# Patient Record
Sex: FEMALE | Race: WHITE | NOT HISPANIC OR LATINO | Employment: OTHER | URBAN - METROPOLITAN AREA
[De-identification: names, ages, dates, MRNs, and addresses within clinical notes are randomized per-mention and may not be internally consistent; named-entity substitution may affect disease eponyms.]

---

## 2019-01-07 ENCOUNTER — TRANSFERRED RECORDS (OUTPATIENT)
Dept: HEALTH INFORMATION MANAGEMENT | Facility: CLINIC | Age: 59
End: 2019-01-07

## 2024-05-20 ENCOUNTER — HOSPITAL ENCOUNTER (INPATIENT)
Facility: CLINIC | Age: 64
LOS: 2 days | Discharge: HOME | DRG: 683 | End: 2024-05-22
Attending: EMERGENCY MEDICINE | Admitting: INTERNAL MEDICINE
Payer: COMMERCIAL

## 2024-05-20 ENCOUNTER — APPOINTMENT (OUTPATIENT)
Dept: CARDIOLOGY | Facility: CLINIC | Age: 64
DRG: 683 | End: 2024-05-20
Attending: PHYSICIAN ASSISTANT
Payer: COMMERCIAL

## 2024-05-20 ENCOUNTER — APPOINTMENT (OUTPATIENT)
Dept: ULTRASOUND IMAGING | Facility: CLINIC | Age: 64
DRG: 683 | End: 2024-05-20
Attending: PHYSICIAN ASSISTANT
Payer: COMMERCIAL

## 2024-05-20 DIAGNOSIS — N17.9 ACUTE RENAL FAILURE, UNSPECIFIED ACUTE RENAL FAILURE TYPE (H): Primary | ICD-10-CM

## 2024-05-20 DIAGNOSIS — N17.9 ACUTE RENAL FAILURE SUPERIMPOSED ON CHRONIC KIDNEY DISEASE, UNSPECIFIED ACUTE RENAL FAILURE TYPE, UNSPECIFIED CKD STAGE (H): ICD-10-CM

## 2024-05-20 DIAGNOSIS — N18.9 ACUTE RENAL FAILURE SUPERIMPOSED ON CHRONIC KIDNEY DISEASE, UNSPECIFIED ACUTE RENAL FAILURE TYPE, UNSPECIFIED CKD STAGE (H): ICD-10-CM

## 2024-05-20 DIAGNOSIS — E87.5 HYPERKALEMIA: ICD-10-CM

## 2024-05-20 LAB
ALBUMIN MFR UR ELPH: 308.2 MG/DL
ALBUMIN UR-MCNC: 300 MG/DL
ANION GAP SERPL CALCULATED.3IONS-SCNC: 11 MMOL/L (ref 7–15)
ANION GAP SERPL CALCULATED.3IONS-SCNC: 13 MMOL/L (ref 7–15)
ANION GAP SERPL CALCULATED.3IONS-SCNC: 15 MMOL/L (ref 7–15)
APPEARANCE UR: CLEAR
ATRIAL RATE - MUSE: 43 BPM
BACTERIA #/AREA URNS HPF: ABNORMAL /HPF
BASOPHILS # BLD AUTO: 0 10E3/UL (ref 0–0.2)
BASOPHILS NFR BLD AUTO: 1 %
BILIRUB UR QL STRIP: NEGATIVE
BUN SERPL-MCNC: 59.8 MG/DL (ref 8–23)
BUN SERPL-MCNC: 61 MG/DL (ref 8–23)
BUN SERPL-MCNC: 68.2 MG/DL (ref 8–23)
CALCIUM SERPL-MCNC: 7.7 MG/DL (ref 8.8–10.2)
CALCIUM SERPL-MCNC: 7.9 MG/DL (ref 8.8–10.2)
CALCIUM SERPL-MCNC: 8 MG/DL (ref 8.8–10.2)
CHLORIDE SERPL-SCNC: 100 MMOL/L (ref 98–107)
CHLORIDE SERPL-SCNC: 101 MMOL/L (ref 98–107)
CHLORIDE SERPL-SCNC: 102 MMOL/L (ref 98–107)
COLLECT DURATION TIME UR: 1915 H
COLOR UR AUTO: ABNORMAL
CREAT SERPL-MCNC: 3.62 MG/DL (ref 0.51–0.95)
CREAT SERPL-MCNC: 3.77 MG/DL (ref 0.51–0.95)
CREAT SERPL-MCNC: 3.96 MG/DL (ref 0.51–0.95)
DEPRECATED HCO3 PLAS-SCNC: 18 MMOL/L (ref 22–29)
DEPRECATED HCO3 PLAS-SCNC: 18 MMOL/L (ref 22–29)
DEPRECATED HCO3 PLAS-SCNC: 20 MMOL/L (ref 22–29)
DIASTOLIC BLOOD PRESSURE - MUSE: NORMAL MMHG
EGFRCR SERPLBLD CKD-EPI 2021: 12 ML/MIN/1.73M2
EGFRCR SERPLBLD CKD-EPI 2021: 13 ML/MIN/1.73M2
EGFRCR SERPLBLD CKD-EPI 2021: 13 ML/MIN/1.73M2
EOSINOPHIL # BLD AUTO: 0.2 10E3/UL (ref 0–0.7)
EOSINOPHIL NFR BLD AUTO: 2 %
ERYTHROCYTE [DISTWIDTH] IN BLOOD BY AUTOMATED COUNT: 14.7 % (ref 10–15)
GLUCOSE BLDC GLUCOMTR-MCNC: 118 MG/DL (ref 70–99)
GLUCOSE BLDC GLUCOMTR-MCNC: 128 MG/DL (ref 70–99)
GLUCOSE BLDC GLUCOMTR-MCNC: 180 MG/DL (ref 70–99)
GLUCOSE BLDC GLUCOMTR-MCNC: 198 MG/DL (ref 70–99)
GLUCOSE BLDC GLUCOMTR-MCNC: 211 MG/DL (ref 70–99)
GLUCOSE BLDC GLUCOMTR-MCNC: 77 MG/DL (ref 70–99)
GLUCOSE BLDC GLUCOMTR-MCNC: 96 MG/DL (ref 70–99)
GLUCOSE SERPL-MCNC: 119 MG/DL (ref 70–99)
GLUCOSE SERPL-MCNC: 196 MG/DL (ref 70–99)
GLUCOSE SERPL-MCNC: 68 MG/DL (ref 70–99)
GLUCOSE UR STRIP-MCNC: 30 MG/DL
HBA1C MFR BLD: 9.7 %
HCT VFR BLD AUTO: 33.1 % (ref 35–47)
HGB BLD-MCNC: 10.7 G/DL (ref 11.7–15.7)
HGB UR QL STRIP: ABNORMAL
HOLD SPECIMEN: NORMAL
HOLD SPECIMEN: NORMAL
HYALINE CASTS: 1 /LPF
IMM GRANULOCYTES # BLD: 0 10E3/UL
IMM GRANULOCYTES NFR BLD: 1 %
INTERPRETATION ECG - MUSE: NORMAL
IRON BINDING CAPACITY (ROCHE): 248 UG/DL (ref 240–430)
IRON SATN MFR SERPL: 15 % (ref 15–46)
IRON SERPL-MCNC: 38 UG/DL (ref 37–145)
KETONES UR STRIP-MCNC: NEGATIVE MG/DL
LEUKOCYTE ESTERASE UR QL STRIP: NEGATIVE
LVEF ECHO: NORMAL
LYMPHOCYTES # BLD AUTO: 1.2 10E3/UL (ref 0.8–5.3)
LYMPHOCYTES NFR BLD AUTO: 16 %
MCH RBC QN AUTO: 28.3 PG (ref 26.5–33)
MCHC RBC AUTO-ENTMCNC: 32.3 G/DL (ref 31.5–36.5)
MCV RBC AUTO: 88 FL (ref 78–100)
MONOCYTES # BLD AUTO: 0.7 10E3/UL (ref 0–1.3)
MONOCYTES NFR BLD AUTO: 10 %
MUCOUS THREADS #/AREA URNS LPF: PRESENT /LPF
NEUTROPHILS # BLD AUTO: 5.2 10E3/UL (ref 1.6–8.3)
NEUTROPHILS NFR BLD AUTO: 70 %
NITRATE UR QL: NEGATIVE
NRBC # BLD AUTO: 0 10E3/UL
NRBC BLD AUTO-RTO: 0 /100
NT-PROBNP SERPL-MCNC: 2332 PG/ML (ref 0–900)
P AXIS - MUSE: -27 DEGREES
PH UR STRIP: 5.5 [PH] (ref 5–7)
PLATELET # BLD AUTO: 258 10E3/UL (ref 150–450)
POTASSIUM SERPL-SCNC: 5.1 MMOL/L (ref 3.4–5.3)
POTASSIUM SERPL-SCNC: 5.1 MMOL/L (ref 3.4–5.3)
POTASSIUM SERPL-SCNC: 5.5 MMOL/L (ref 3.4–5.3)
POTASSIUM SERPL-SCNC: 5.9 MMOL/L (ref 3.4–5.3)
POTASSIUM SERPL-SCNC: 6.3 MMOL/L (ref 3.4–5.3)
PR INTERVAL - MUSE: 242 MS
PROT 24H UR-MRATE: 3.48 MG/SPECIMEN
QRS DURATION - MUSE: 98 MS
QT - MUSE: 488 MS
QTC - MUSE: 412 MS
R AXIS - MUSE: 13 DEGREES
RBC # BLD AUTO: 3.78 10E6/UL (ref 3.8–5.2)
RBC URINE: <1 /HPF
SODIUM SERPL-SCNC: 131 MMOL/L (ref 135–145)
SODIUM SERPL-SCNC: 132 MMOL/L (ref 135–145)
SODIUM SERPL-SCNC: 135 MMOL/L (ref 135–145)
SP GR UR STRIP: 1.01 (ref 1–1.03)
SPECIMEN VOL UR: 90 ML
SQUAMOUS EPITHELIAL: <1 /HPF
SYSTOLIC BLOOD PRESSURE - MUSE: NORMAL MMHG
T AXIS - MUSE: 42 DEGREES
UROBILINOGEN UR STRIP-MCNC: NORMAL MG/DL
VENTRICULAR RATE- MUSE: 43 BPM
WBC # BLD AUTO: 7.3 10E3/UL (ref 4–11)
WBC URINE: 2 /HPF

## 2024-05-20 PROCEDURE — 80048 BASIC METABOLIC PNL TOTAL CA: CPT | Performed by: EMERGENCY MEDICINE

## 2024-05-20 PROCEDURE — 84166 PROTEIN E-PHORESIS/URINE/CSF: CPT | Mod: 26 | Performed by: PATHOLOGY

## 2024-05-20 PROCEDURE — 85025 COMPLETE CBC W/AUTO DIFF WBC: CPT | Performed by: EMERGENCY MEDICINE

## 2024-05-20 PROCEDURE — 93005 ELECTROCARDIOGRAM TRACING: CPT

## 2024-05-20 PROCEDURE — 99285 EMERGENCY DEPT VISIT HI MDM: CPT | Mod: 25

## 2024-05-20 PROCEDURE — 96375 TX/PRO/DX INJ NEW DRUG ADDON: CPT

## 2024-05-20 PROCEDURE — 81050 URINALYSIS VOLUME MEASURE: CPT | Performed by: INTERNAL MEDICINE

## 2024-05-20 PROCEDURE — 250N000013 HC RX MED GY IP 250 OP 250 PS 637: Performed by: EMERGENCY MEDICINE

## 2024-05-20 PROCEDURE — 82962 GLUCOSE BLOOD TEST: CPT

## 2024-05-20 PROCEDURE — 250N000012 HC RX MED GY IP 250 OP 636 PS 637: Performed by: INTERNAL MEDICINE

## 2024-05-20 PROCEDURE — 99223 1ST HOSP IP/OBS HIGH 75: CPT | Performed by: PHYSICIAN ASSISTANT

## 2024-05-20 PROCEDURE — 250N000013 HC RX MED GY IP 250 OP 250 PS 637: Performed by: INTERNAL MEDICINE

## 2024-05-20 PROCEDURE — 258N000001 HC RX 258: Performed by: EMERGENCY MEDICINE

## 2024-05-20 PROCEDURE — 81001 URINALYSIS AUTO W/SCOPE: CPT | Performed by: EMERGENCY MEDICINE

## 2024-05-20 PROCEDURE — 250N000011 HC RX IP 250 OP 636: Performed by: EMERGENCY MEDICINE

## 2024-05-20 PROCEDURE — 93975 VASCULAR STUDY: CPT

## 2024-05-20 PROCEDURE — 83880 ASSAY OF NATRIURETIC PEPTIDE: CPT | Performed by: PHYSICIAN ASSISTANT

## 2024-05-20 PROCEDURE — 99222 1ST HOSP IP/OBS MODERATE 55: CPT | Performed by: INTERNAL MEDICINE

## 2024-05-20 PROCEDURE — 36415 COLL VENOUS BLD VENIPUNCTURE: CPT | Performed by: INTERNAL MEDICINE

## 2024-05-20 PROCEDURE — 258N000003 HC RX IP 258 OP 636: Performed by: EMERGENCY MEDICINE

## 2024-05-20 PROCEDURE — 82310 ASSAY OF CALCIUM: CPT | Performed by: INTERNAL MEDICINE

## 2024-05-20 PROCEDURE — 84132 ASSAY OF SERUM POTASSIUM: CPT | Performed by: PHYSICIAN ASSISTANT

## 2024-05-20 PROCEDURE — 83036 HEMOGLOBIN GLYCOSYLATED A1C: CPT | Performed by: INTERNAL MEDICINE

## 2024-05-20 PROCEDURE — 84156 ASSAY OF PROTEIN URINE: CPT | Performed by: INTERNAL MEDICINE

## 2024-05-20 PROCEDURE — 36415 COLL VENOUS BLD VENIPUNCTURE: CPT | Performed by: PHYSICIAN ASSISTANT

## 2024-05-20 PROCEDURE — 120N000001 HC R&B MED SURG/OB

## 2024-05-20 PROCEDURE — 84166 PROTEIN E-PHORESIS/URINE/CSF: CPT | Performed by: PATHOLOGY

## 2024-05-20 PROCEDURE — 96374 THER/PROPH/DIAG INJ IV PUSH: CPT | Mod: 59

## 2024-05-20 PROCEDURE — 36415 COLL VENOUS BLD VENIPUNCTURE: CPT | Performed by: EMERGENCY MEDICINE

## 2024-05-20 PROCEDURE — 93306 TTE W/DOPPLER COMPLETE: CPT

## 2024-05-20 PROCEDURE — 82607 VITAMIN B-12: CPT | Performed by: PHYSICIAN ASSISTANT

## 2024-05-20 PROCEDURE — 93306 TTE W/DOPPLER COMPLETE: CPT | Mod: 26 | Performed by: INTERNAL MEDICINE

## 2024-05-20 PROCEDURE — 83550 IRON BINDING TEST: CPT | Performed by: PHYSICIAN ASSISTANT

## 2024-05-20 RX ORDER — ATORVASTATIN CALCIUM 40 MG/1
40 TABLET, FILM COATED ORAL DAILY
COMMUNITY
Start: 2024-05-16 | End: 2025-05-16

## 2024-05-20 RX ORDER — LIDOCAINE 40 MG/G
CREAM TOPICAL
Status: DISCONTINUED | OUTPATIENT
Start: 2024-05-20 | End: 2024-05-22 | Stop reason: HOSPADM

## 2024-05-20 RX ORDER — ONDANSETRON 4 MG/1
4 TABLET, ORALLY DISINTEGRATING ORAL EVERY 6 HOURS PRN
Status: DISCONTINUED | OUTPATIENT
Start: 2024-05-20 | End: 2024-05-22 | Stop reason: HOSPADM

## 2024-05-20 RX ORDER — SODIUM BICARBONATE 650 MG/1
650 TABLET ORAL 2 TIMES DAILY
Status: DISCONTINUED | OUTPATIENT
Start: 2024-05-20 | End: 2024-05-22 | Stop reason: HOSPADM

## 2024-05-20 RX ORDER — DEXTROSE MONOHYDRATE 25 G/50ML
25 INJECTION, SOLUTION INTRAVENOUS ONCE
Status: COMPLETED | OUTPATIENT
Start: 2024-05-20 | End: 2024-05-20

## 2024-05-20 RX ORDER — ACETAMINOPHEN 325 MG/1
650 TABLET ORAL EVERY 4 HOURS PRN
Status: DISCONTINUED | OUTPATIENT
Start: 2024-05-20 | End: 2024-05-22 | Stop reason: HOSPADM

## 2024-05-20 RX ORDER — INSULIN GLARGINE 100 [IU]/ML
16 INJECTION, SOLUTION SUBCUTANEOUS AT BEDTIME
Status: ON HOLD | COMMUNITY
Start: 2024-05-17 | End: 2024-05-22

## 2024-05-20 RX ORDER — AMLODIPINE BESYLATE 10 MG/1
10 TABLET ORAL DAILY
COMMUNITY
Start: 2024-05-15

## 2024-05-20 RX ORDER — AMOXICILLIN 250 MG
2 CAPSULE ORAL 2 TIMES DAILY PRN
Status: DISCONTINUED | OUTPATIENT
Start: 2024-05-20 | End: 2024-05-22 | Stop reason: HOSPADM

## 2024-05-20 RX ORDER — NICOTINE POLACRILEX 4 MG
15-30 LOZENGE BUCCAL
Status: DISCONTINUED | OUTPATIENT
Start: 2024-05-20 | End: 2024-05-22 | Stop reason: HOSPADM

## 2024-05-20 RX ORDER — DEXTROSE MONOHYDRATE 25 G/50ML
25-50 INJECTION, SOLUTION INTRAVENOUS
Status: DISCONTINUED | OUTPATIENT
Start: 2024-05-20 | End: 2024-05-22 | Stop reason: HOSPADM

## 2024-05-20 RX ORDER — CALCITRIOL 0.25 UG/1
0.25 CAPSULE, LIQUID FILLED ORAL DAILY
Status: DISCONTINUED | OUTPATIENT
Start: 2024-05-20 | End: 2024-05-22 | Stop reason: HOSPADM

## 2024-05-20 RX ORDER — NICOTINE POLACRILEX 4 MG
15-30 LOZENGE BUCCAL
Status: DISCONTINUED | OUTPATIENT
Start: 2024-05-20 | End: 2024-05-21

## 2024-05-20 RX ORDER — CALCIUM GLUCONATE 20 MG/ML
1 INJECTION, SOLUTION INTRAVENOUS ONCE
Status: COMPLETED | OUTPATIENT
Start: 2024-05-20 | End: 2024-05-20

## 2024-05-20 RX ORDER — FUROSEMIDE 80 MG
1 TABLET ORAL DAILY
Status: ON HOLD | COMMUNITY
Start: 2024-05-15 | End: 2024-05-22

## 2024-05-20 RX ORDER — LISINOPRIL 30 MG/1
1 TABLET ORAL DAILY
Status: ON HOLD | COMMUNITY
Start: 2024-05-15 | End: 2024-05-22

## 2024-05-20 RX ORDER — AMOXICILLIN 250 MG
1 CAPSULE ORAL 2 TIMES DAILY PRN
Status: DISCONTINUED | OUTPATIENT
Start: 2024-05-20 | End: 2024-05-22 | Stop reason: HOSPADM

## 2024-05-20 RX ORDER — AMLODIPINE BESYLATE 10 MG/1
10 TABLET ORAL DAILY
Status: DISCONTINUED | OUTPATIENT
Start: 2024-05-21 | End: 2024-05-22 | Stop reason: HOSPADM

## 2024-05-20 RX ORDER — ATORVASTATIN CALCIUM 40 MG/1
40 TABLET, FILM COATED ORAL DAILY
Status: DISCONTINUED | OUTPATIENT
Start: 2024-05-21 | End: 2024-05-22 | Stop reason: HOSPADM

## 2024-05-20 RX ORDER — LEVOTHYROXINE SODIUM 175 UG/1
175 TABLET ORAL DAILY
COMMUNITY
Start: 2024-05-15

## 2024-05-20 RX ORDER — ONDANSETRON 2 MG/ML
4 INJECTION INTRAMUSCULAR; INTRAVENOUS EVERY 6 HOURS PRN
Status: DISCONTINUED | OUTPATIENT
Start: 2024-05-20 | End: 2024-05-22 | Stop reason: HOSPADM

## 2024-05-20 RX ORDER — DEXTROSE MONOHYDRATE 25 G/50ML
25-50 INJECTION, SOLUTION INTRAVENOUS
Status: DISCONTINUED | OUTPATIENT
Start: 2024-05-20 | End: 2024-05-21

## 2024-05-20 RX ORDER — DEXTROSE MONOHYDRATE 25 G/50ML
INJECTION, SOLUTION INTRAVENOUS
Status: COMPLETED
Start: 2024-05-20 | End: 2024-05-20

## 2024-05-20 RX ADMIN — INSULIN ASPART 1 UNITS: 100 INJECTION, SOLUTION INTRAVENOUS; SUBCUTANEOUS at 22:46

## 2024-05-20 RX ADMIN — SODIUM CHLORIDE 1000 ML: 9 INJECTION, SOLUTION INTRAVENOUS at 13:16

## 2024-05-20 RX ADMIN — DEXTROSE MONOHYDRATE 300 ML: 100 INJECTION, SOLUTION INTRAVENOUS at 13:45

## 2024-05-20 RX ADMIN — CALCITRIOL CAPSULES 0.25 MCG 0.25 MCG: 0.25 CAPSULE ORAL at 18:53

## 2024-05-20 RX ADMIN — DEXTROSE MONOHYDRATE 25 G: 25 INJECTION, SOLUTION INTRAVENOUS at 13:38

## 2024-05-20 RX ADMIN — SODIUM BICARBONATE 650 MG TABLET 650 MG: at 21:18

## 2024-05-20 RX ADMIN — CALCIUM GLUCONATE 1 G: 20 INJECTION, SOLUTION INTRAVENOUS at 13:17

## 2024-05-20 RX ADMIN — SODIUM ZIRCONIUM CYCLOSILICATE 15 G: 10 POWDER, FOR SUSPENSION ORAL at 22:49

## 2024-05-20 RX ADMIN — Medication 8.1 UNITS: at 13:42

## 2024-05-20 ASSESSMENT — COLUMBIA-SUICIDE SEVERITY RATING SCALE - C-SSRS
2. HAVE YOU ACTUALLY HAD ANY THOUGHTS OF KILLING YOURSELF IN THE PAST MONTH?: NO
1. IN THE PAST MONTH, HAVE YOU WISHED YOU WERE DEAD OR WISHED YOU COULD GO TO SLEEP AND NOT WAKE UP?: NO
6. HAVE YOU EVER DONE ANYTHING, STARTED TO DO ANYTHING, OR PREPARED TO DO ANYTHING TO END YOUR LIFE?: NO

## 2024-05-20 ASSESSMENT — ACTIVITIES OF DAILY LIVING (ADL)
ADLS_ACUITY_SCORE: 38
ADLS_ACUITY_SCORE: 25
ADLS_ACUITY_SCORE: 38
ADLS_ACUITY_SCORE: 35
ADLS_ACUITY_SCORE: 24
ADLS_ACUITY_SCORE: 33
ADLS_ACUITY_SCORE: 24
ADLS_ACUITY_SCORE: 35
ADLS_ACUITY_SCORE: 24
ADLS_ACUITY_SCORE: 23
ADLS_ACUITY_SCORE: 38
ADLS_ACUITY_SCORE: 25

## 2024-05-20 NOTE — CONSULTS
RENAL CONSULTATION NOTE    REFERRING MD:  Rosio Clayton PA-C     REASON FOR CONSULTATION:  CKD, Hyperkalemia     HPI:  64 y.o woman with advanced CKD due to DM and HTN, who was directed to the hospital for abnormal lab.     She moved to [place] from [place], [place].   She says a nephrologist over a year ago.  She says she was told her kidney function was at 15% .  She says she was told her kidney disease was due to DM and HTN.     Her primary care checked her labs on 5/15.   Her creatinine was 4.16 mg/dl.   Per note, her Scr was 2.8 mg/dl on 4/2023    She has been feeling more tired, but otherwise no other complaints.  She denies N/V.  She says food tastes good.  Denies SOB.   Denies taking NSAIDs.   She came back from [place] April.     I reviewed notes from ER (Dr. Ravi) and IM (WILMAN Garrido)  ROS:  A complete review of systems was performed and is negative except as noted above.    PMH:  No past medical history on file.    PSH:  No past surgical history on file.    MEDICATIONS:    Current Facility-Administered Medications   Medication Dose Route Frequency Provider Last Rate Last Admin    dextrose 10% BOLUS 300 mL  300 mL Intravenous Once Elzbieta Ravi MD 75 mL/hr at 05/20/24 1345 300 mL at 05/20/24 1345    [START ON 5/21/2024] insulin aspart (NovoLOG) injection (RAPID ACTING)  1-7 Units Subcutaneous TID AC Rosio Clayton PA-C        insulin aspart (NovoLOG) injection (RAPID ACTING)  1-5 Units Subcutaneous At Bedtime Rosio Clayton PA-C        sodium chloride (PF) 0.9% PF flush 3 mL  3 mL Intracatheter Q8H Rosio Clayton PA-C           ALLERGIES:    Allergies as of 05/20/2024    (No Known Allergies)       FH:  No family history on file.    SH:    Social History     Socioeconomic History    Marital status: Legally      Spouse name: Not on file    Number of children: Not on file    Years of education: Not on file    Highest education level: Not on file   Occupational History    Not  "on file   Tobacco Use    Smoking status: Not on file    Smokeless tobacco: Not on file   Substance and Sexual Activity    Alcohol use: Not on file    Drug use: Not on file    Sexual activity: Not on file   Other Topics Concern    Not on file   Social History Narrative    Not on file     Social Determinants of Health     Financial Resource Strain: Not on file   Food Insecurity: Not on file   Transportation Needs: Not on file   Physical Activity: Not on file   Stress: Not on file   Social Connections: Not on file   Interpersonal Safety: Not on file   Housing Stability: Not on file       PHYSICAL EXAM:    BP (!) 141/57 (BP Location: Right arm, Patient Position: Left side, Cuff Size: Adult Regular)   Pulse 60   Temp 97.8  F (36.6  C) (Oral)   Resp 18   Ht 1.575 m (5' 2\")   Wt 81.6 kg (179 lb 12.8 oz)   SpO2 94%   BMI 32.89 kg/m    GENERAL: NAD  HEENT:  Normocephalic. No gross abnormalities.  Pupils equal.  MMM.    CV: RRR, + murmurs, no clicks, gallops, or rubs, trace edema  RESP: Clear bilaterally with good efforts  GI: Abdomen obese, soft, NT. ND  MUSCULOSKELETAL: extremities trace edema.  SKIN: no suspicious lesions or rashes, dry to touch  NEURO:  Awake, alert and conversing normally.  PSYCH: mood good, affect appropriate  LYMPH: No palpable ant/post cervical     LABS:      CBC RESULTS:     Recent Labs   Lab 05/20/24  1141   WBC 7.3   RBC 3.78*   HGB 10.7*   HCT 33.1*          BMP RESULTS:  Recent Labs   Lab 05/20/24  1526 05/20/24  1423 05/20/24  1321 05/20/24  1309 05/20/24  1141   *  --   --   --  135   POTASSIUM 5.1  5.1  --   --  5.9* 6.3*   CHLORIDE 101  --   --   --  102   CO2 18*  --   --   --  18*   BUN 61.0*  --   --   --  68.2*   CR 3.77*  --   --   --  3.96*   GLC 68* 118* 96  --  119*   BETH 7.9*  --   --   --  8.0*       INRNo lab results found in last 7 days.     DIAGNOSTICS:  Reviewed    A/P:  64 y.o woman with advanced CKD presumed due to DM and HTN.     # CKD V: No uremic " symptosm.     # Hyperkalemia: Improvement with treatment.     # Acidosis: Bicarb is below target    # Hypertension: Okay with SBP ~ 140 for now.     # Anemia due to CKD + component of Fe def:    # Hypocalcemia: like vit D def.     # Uncontrolled DM:     Plan:  # Check UPCR, SPEP/UPEP  # Check vit D, phos and PTH  # Start sodium bicarbonate   # Okay with SBP ~ 140  # Check TTE-loud heart murmur  # Check hep B/C  # 2 grams Na and 2 grams K restricted diet  # No indication for RRT    Remigio Cobian MD  [facility] - Nephrology  Office Phone: [phone]  Pager: [phone]

## 2024-05-20 NOTE — ED PROVIDER NOTES
"ED ATTENDING PHYSICIAN NOTE:   I evaluated this patient in conjunction with Albert Clifton NP.  I have participated in the care of the patient and personally performed key elements of the history, exam, and medical decision making.      HPI:   Nisreen Mckenna is a 64 year old female who presents for abnormal lab results. The patients primary care provider notified her that her kidney function was elevated. States she was diagnosed with CKD back in [place], recently moved to [place].   She does not know her last sodium level.  She notes she last saw her primary provider in [place] over 1 year ago she believes.  She denies weakness, fevers, urinary changes, abdominal or flank pain.  She has been eating and drinking at baseline.  She has had no vomiting or diarrhea.    Independent Historian:   None    Review of External Notes: Office visit from 5/15/2024 reviewed for establishment of care.  Blood was drawn at this time with creatinine of over 4 noted.  We were able to obtain creatinine from a clinic in [place] was noted to be approximately 2.8 in April 2023.     I reviewed the patient's PMH and medications.     EXAM:   /58   Pulse (!) 46   Temp 98.3  F (36.8  C) (Oral)   Resp 18   Ht 1.575 m (5' 2\")   Wt 81.2 kg (179 lb)   SpO2 95%   BMI 32.74 kg/m    General: Elderly adult sitting upright  Eyes: PERRL, Conjunctive within normal limits.  No scleral icterus  ENT: Moist mucous membranes, oropharynx clear.   CV: Normal S1S2.  Systolic murmur appreciated.  Bradycardic.  Regular.  Resp: Clear to auscultation bilaterally, no wheezes, rales or rhonchi. Normal respiratory effort.  GI: Abdomen is soft, nontender and nondistended. No palpable masses. No rebound or guarding.  No CVA tenderness to percussion.  MSK: Trace edema bilaterally at the ankles.  Nontender. Normal active range of motion.  Skin: Warm and dry. No rashes or lesions or ecchymoses on visible skin.  Neuro: Alert and oriented. Responds " appropriately to all questions and commands. No focal findings appreciated.   Psych: Appropriate mood and affect.    Labs Ordered and Resulted from Time of ED Arrival to Time of ED Departure   ROUTINE UA WITH MICROSCOPIC REFLEX TO CULTURE - Abnormal       Result Value    Color Urine Straw      Appearance Urine Clear      Glucose Urine 30 (*)     Bilirubin Urine Negative      Ketones Urine Negative      Specific Gravity Urine 1.010      Blood Urine Trace (*)     pH Urine 5.5      Protein Albumin Urine 300 (*)     Urobilinogen Urine Normal      Nitrite Urine Negative      Leukocyte Esterase Urine Negative      Bacteria Urine Few (*)     Mucus Urine Present (*)     RBC Urine <1      WBC Urine 2      Squamous Epithelials Urine <1      Hyaline Casts Urine 1     BASIC METABOLIC PANEL - Abnormal    Sodium 135      Potassium 6.3 (*)     Chloride 102      Carbon Dioxide (CO2) 18 (*)     Anion Gap 15      Urea Nitrogen 68.2 (*)     Creatinine 3.96 (*)     GFR Estimate 12 (*)     Calcium 8.0 (*)     Glucose 119 (*)    CBC WITH PLATELETS AND DIFFERENTIAL - Abnormal    WBC Count 7.3      RBC Count 3.78 (*)     Hemoglobin 10.7 (*)     Hematocrit 33.1 (*)     MCV 88      MCH 28.3      MCHC 32.3      RDW 14.7      Platelet Count 258      % Neutrophils 70      % Lymphocytes 16      % Monocytes 10      % Eosinophils 2      % Basophils 1      % Immature Granulocytes 1      NRBCs per 100 WBC 0      Absolute Neutrophils 5.2      Absolute Lymphocytes 1.2      Absolute Monocytes 0.7      Absolute Eosinophils 0.2      Absolute Basophils 0.0      Absolute Immature Granulocytes 0.0      Absolute NRBCs 0.0     POTASSIUM - Abnormal    Potassium 5.9 (*)    GLUCOSE BY METER - Normal    GLUCOSE BY METER POCT 96     GLUCOSE MONITOR NURSING POCT   GLUCOSE MONITOR NURSING POCT   GLUCOSE MONITOR NURSING POCT       ECG results from 05/20/24   EKG 12 lead     Value    Systolic Blood Pressure     Diastolic Blood Pressure     Ventricular Rate 43     Atrial Rate 43    VA Interval 242    QRS Duration 98        QTc 412    P Axis -27    R AXIS 13    T Axis 42    Interpretation ECG      Sinus bradycardia with 1st degree A-V block  Minimal voltage criteria for LVH, may be normal variant ( [place] product )  Abnormal ECG  No previous ECGs available  Confirmed by - EMERGENCY ROOM, PHYSICIAN ([id number]),  JAZMYNE MCGUIRE ([id number]) on 5/20/2024 12:59:05 PM         Medications   glucose gel 15-30 g (has no administration in time range)     Or   dextrose 50 % injection 25-50 mL (has no administration in time range)     Or   glucagon injection 1 mg (has no administration in time range)   dextrose 10% BOLUS 300 mL (300 mLs Intravenous $New Bag 5/20/24 1345)   calcium gluconate 1 g in 50 mL in sodium chloride intermittent infusion (1 g Intravenous $Given 5/20/24 1317)   dextrose 50 % injection 25 g (25 g Intravenous $Given 5/20/24 1338)   insulin regular 1 unit/mL injection 8.1 Units (8.1 Units Intravenous $Given 5/20/24 1342)   sodium chloride 0.9% BOLUS 1,000 mL (1,000 mLs Intravenous $New Bag 5/20/24 1316)   dextrose 50 % injection (  Not Given 5/20/24 1350)       Independent Interpretation (X-rays, CTs, rhythm strip):  None    Consultations/Discussion of Management or Tests:  None     Social Determinants of Health affecting care:   None     MEDICAL DECISION MAKING/ASSESSMENT AND PLAN:   Nisreen Mckenna is a 64-year-old female with chronic kidney disease and relatively new establishment of care in this area after moving from [place] who presents to the emergency department with outpatient clinic concerns for elevated creatinine.  On evaluation here she is asymptomatic.  She has known chronic kidney disease based on labs from a [place] clinic in April 2023.  She has acute on chronic kidney disease and is noted to be hyperkalemic today and treated with hyperkalemia therapy.  She has sinus bradycardia with first-degree AV block but no other EKG changes.  Medication for  immediate dialysis were nephrology intervention, but will need ongoing telemetry monitoring treatment of hyperkalemia.     DIAGNOSIS:     ICD-10-CM    1. Hyperkalemia  E87.5       2. Acute renal failure superimposed on chronic kidney disease, unspecified acute renal failure type, unspecified CKD stage  (H24)  N17.9     N18.9           DISPOSITION:   Admitted to the hospitalist service, Dr. Dia.     Scribe Disclosure:  I, Werner Estevez, am serving as a scribe at 12:10 PM on 5/20/2024 to document services personally performed by Elzbieta Ravi MD based on my observations and the provider's statements to me.     5/20/2024  [facility] EMERGENCY DEPT     Elzbieta Ravi MD  05/20/24 8276

## 2024-05-20 NOTE — H&P
[facility]  Internal Medicine  History and Physical      Patient Name: Nisreen Mckenna MRN# [id number]   Age: 64 year old YOB: 1960     Date of Admission:5/20/2024    Primary care provider: Vivi Morfin  Date of Service: 5/20/2024         Assessment and Plan:   Nisreen Mckenna is a 64 year old female with a history of HTN, Hypothyroidism, TARA, Obesity, LE edema, CKD, HLD who presents to the ED today as she was notified by her primary care provider's office earlier today that her kidney function was elevated and that she was to report to the ED for further evaluation.        CKD  Likely MICHELLE on CKD  Creatinine on admission 3.96.  Creatinine last week at PCP visit 4.16.  Unclear baseline but reports a hx of CKD due to underlying Diabetes and HTN and was followed by Nephrology in the past while living in [place].    - UA with no signs of infection, but reveals few bacteria, 300 protein and glucose present.  - hold pta Lisinopril and Lasix  - IVF hydration x 1 liter   - check renal ultrasound  - monitor I/O  - Nephrology consult    Hyperkalemia  D/t kidney disease.  Potassium on admission 6.3.  S/p Calcium Gluconate, D50/Insulin and IVF bolus.  - monitor serial potassium levels    Sinus Bradycardia with First-Degree AVB  HR in the 40s with unclear baseline, but likely related to acute hyperkalemia  - monitor on telemetry    HTN  Patient denies any hx of CAD.  Does not recall ever having a stress test or angiogram.  Has had long standing hypertension for 30 years.  - continue pta Amlodipine  - hold Lisinopril and Lasix for now due to elevated creatinine.    Hx Atrial Flutter s/p Ablation  S/p ablation while living in [place] ~10 years ago.  Was previously on blood thinners.  No current blood thinners.  - monitor on telemetry    Hx Pulmonary HTN  Heart Murmur  Systolic murmur noted on exam.  Pt reports this has been known.  No hx of valvular heart disease that she is aware of.  Has chronic  peripheral edema at baseline.  Known pHTN  - obtain echocardiogram and add on BNP    DM Type 2  Diagnosed ~30 years ago.  Was off insulin for 4 years after weight loss.  Not checking blood sugars recently until seen by PCP last week where Hgb A1C 10.2 (5/15/24).  Seen by Diabetes Educator last week and started on Glargine 16 units daily.  BS on admission 119.    - will start ISS protocol  - awaiting formal pharmacy med rec, likely resume pta Glargine in am    Hyperlipidemia   Last lipid panel (5/15/2024) Tchol 351, HDL 62, , Trig 217.  Was on Atorvastatin years ago, recently ordered to start again by PCP last week  - continue Atorvastatin    Hypothyroidism  TSH last week wnl.  - continue Levothyroxine    Chronic Anemia  Hgb last week 11.2, down to 10.7 on admission which is likely related to kidney disease.  Patient denies any blood loss.  Has had a colonoscopy within the past 10 years she reports was unremarkable.  - check iron studies, B12, folate    TARA  - no longer uses cpap after weight loss  - follow up with PCP to consider sleep evaluation to reassess    Obesity, BMI 32    CODE: full  Diet/IVF: renal diet  DVT ppx: scd    Rosio Clayton MS PA-C  Physician Assistant   Hospitalist Service    Awaiting formal pharmacy med rec to confirm home medications.           Chief Complaint:   Abnormal Labs         HPI:   64 year old female with a history of HTN, Hypothyroidism, TARA, Obesity, LE edema, CKD, HLD who presents to the ED today as she was notified by her primary care provider's office earlier today that her kidney function was elevated and that she was to report to the ED for further evaluation.      Patient presents to the ED today after she established care with a new PCP last week and was found to have elevated creatinine and instructed to present to the ED.  Patient reports overall feeling fatigued, but otherwise has no specific complaints.      Patient lived in [place] until September 2023 and  moved to [place] where she lives with her son.  She did not establish care with a new PCP until last week where she was noted to have an A1C of 10.2, referred to diabetes education and started on Glargine 16 units daily.  She was also found to have elevated lipid panel and started on Atorvastatin of which she has yet to start.  Additionally, noted to have elevated creatinine.  She has chronic LE edema which is at baseline for which she is on Lasix 80mg daily.  She reports a hx of a heart murmur with no recent echocardiogram.  She denies any hx of CAD.  She has a hx of atrial flutter s/p ablation and has been on blood thinners in the past, no longer.  She has pulmonary hypertension and TARA, no longer on cpap since she lost weight a few years ago.           Past Medical History:   HTN, Hypothyroidism, TARA, Obesity, LE edema, CKD, HLD       Past Surgical History:   Cholecystectomy  [place] teeth removal         Social History:     Social History     Socioeconomic History    Marital status: Legally      Spouse name: Not on file    Number of children: Not on file    Years of education: Not on file    Highest education level: Not on file   Occupational History    Not on file   Tobacco Use    Smoking status: Not on file    Smokeless tobacco: Not on file   Substance and Sexual Activity    Alcohol use: Not on file    Drug use: Not on file    Sexual activity: Not on file   Other Topics Concern    Not on file   Social History Narrative    Not on file     Social Determinants of Health     Financial Resource Strain: Not on file   Food Insecurity: Not on file   Transportation Needs: Not on file   Physical Activity: Not on file   Stress: Not on file   Social Connections: Not on file   Interpersonal Safety: Not on file   Housing Stability: Not on file   Lives with her son.  No tobacco or alcohol use.  Is on disability.       Family History:   Grandfather with blood clots  Grandmother with diabetes       Allergies:    No  "Known Allergies       Medications:   Awaiting formal pharmacy med rec         Review of Systems:   A complete ROS was performed and is negative other than what is stated in the HPI.       Physical Exam:   Blood pressure 139/58, pulse (!) 46, temperature 98.3  F (36.8  C), temperature source Oral, resp. rate 18, height 1.575 m (5' 2\"), weight 81.2 kg (179 lb), SpO2 95%.  General: Alert, interactive, NAD  HEENT: AT/NC  Chest/Resp: clear to auscultation bilaterally, no crackles or wheezes  Heart/CV: regular rate and rhythm, 4/6 systolic murmur  Abdomen/GI: Soft, nontender, nondistended. +BS.  No rebound or guarding.  Extremities/MSK: 1+ pitting BLE edema  Skin: Warm and dry  Neuro: Alert & oriented x 3, no focal deficits, moves all extremities equally         Labs:     CMP  Recent Labs   Lab 05/20/24  1321 05/20/24  1141   NA  --  135   POTASSIUM  --  6.3*   CHLORIDE  --  102   CO2  --  18*   ANIONGAP  --  15   GLC 96 119*   BUN  --  68.2*   CR  --  3.96*   GFRESTIMATED  --  12*   BETH  --  8.0*     CBC  Recent Labs   Lab 05/20/24  1141   WBC 7.3   RBC 3.78*   HGB 10.7*   HCT 33.1*   MCV 88   MCH 28.3   MCHC 32.3   RDW 14.7             Imaging/Procedures:   No results found for this or any previous visit.      "

## 2024-05-20 NOTE — ED PROVIDER NOTES
"  Emergency Department Note      History of Present Illness     Chief Complaint  Abnormal Labs    HPI  Nisreen Mckenna is a 64 year old female who presents to the emergency room for repeat evaluation of lab work.  Patient was notified by her primary care provider's office earlier today that her kidney function was elevated and that she was to report to the ED for further evaluation.  Patient reports that she has a history of hypertension, kidney disease, and type 2 diabetes.  Patient states that she recently moved and has been unable to get into a primary care provider's office.  She states that she was diagnosed with kidney disease by her previous provider back in [place] however is unable to recall specifically how elevated her previous kidney function was.  Patient denies any pain, nausea, vomiting, diarrhea.       Independent Historian  None    Review of External Notes  None  Past Medical History   Medical History and Problem List  No past medical history on file.    Medications  No current outpatient medications on file.      Surgical History   No past surgical history on file.  Physical Exam   Patient Vitals for the past 24 hrs:   BP Temp Temp src Pulse Resp SpO2 Height Weight   05/20/24 1215 139/58 -- -- (!) 46 -- -- -- --   05/20/24 1057 (!) 146/60 -- -- -- -- -- 1.575 m (5' 2\") 81.2 kg (179 lb)   05/20/24 1056 -- 98.3  F (36.8  C) Oral 59 18 95 % -- --     Physical Exam  General: Awake, alert, non-toxic.  Head:  Scalp is NC/AT  Eyes:  Conjunctiva normal, PERRL  ENT:  The external nose and ears are normal.     Oropharynx clear, uvula midline.  Neck:  Normal range of motion without rigidity.  CV:  Slow rate and rhythm (EKG confirmed bradycardia.)     Murmer heard on auscultation.   Resp:  Breath sounds are clear bilaterally    Non-labored, no retractions or accessory muscle use  Abdomen: Abdomen is soft, no distension, no tenderness, no masses. No CVA tenderness.  MS:  No lower extremity edema/swelling. No " midline cervical, thoracic, or lumbar tenderness.  Extremities without joint swelling or redness.  Skin:  Warm and dry, No rash or lesions noted.  Neuro:  Alert and oriented.  GCS 15 Moves all extremities normal.  No facial asymmetry. Gait normal.  Psych:  Awake. Alert. Normal affect. Appropriate interactions.   Diagnostics   Lab Results   Labs Ordered and Resulted from Time of ED Arrival to Time of ED Departure   ROUTINE UA WITH MICROSCOPIC REFLEX TO CULTURE - Abnormal       Result Value    Color Urine Straw      Appearance Urine Clear      Glucose Urine 30 (*)     Bilirubin Urine Negative      Ketones Urine Negative      Specific Gravity Urine 1.010      Blood Urine Trace (*)     pH Urine 5.5      Protein Albumin Urine 300 (*)     Urobilinogen Urine Normal      Nitrite Urine Negative      Leukocyte Esterase Urine Negative      Bacteria Urine Few (*)     Mucus Urine Present (*)     RBC Urine <1      WBC Urine 2      Squamous Epithelials Urine <1      Hyaline Casts Urine 1     BASIC METABOLIC PANEL - Abnormal    Sodium 135      Potassium 6.3 (*)     Chloride 102      Carbon Dioxide (CO2) 18 (*)     Anion Gap 15      Urea Nitrogen 68.2 (*)     Creatinine 3.96 (*)     GFR Estimate 12 (*)     Calcium 8.0 (*)     Glucose 119 (*)    CBC WITH PLATELETS AND DIFFERENTIAL - Abnormal    WBC Count 7.3      RBC Count 3.78 (*)     Hemoglobin 10.7 (*)     Hematocrit 33.1 (*)     MCV 88      MCH 28.3      MCHC 32.3      RDW 14.7      Platelet Count 258      % Neutrophils 70      % Lymphocytes 16      % Monocytes 10      % Eosinophils 2      % Basophils 1      % Immature Granulocytes 1      NRBCs per 100 WBC 0      Absolute Neutrophils 5.2      Absolute Lymphocytes 1.2      Absolute Monocytes 0.7      Absolute Eosinophils 0.2      Absolute Basophils 0.0      Absolute Immature Granulocytes 0.0      Absolute NRBCs 0.0     POTASSIUM - Abnormal    Potassium 5.9 (*)    GLUCOSE BY METER - Normal    GLUCOSE BY METER POCT 96     GLUCOSE  MONITOR NURSING POCT   GLUCOSE MONITOR NURSING POCT   GLUCOSE MONITOR NURSING POCT       Imaging  No orders to display       EKG   ECG results from 05/20/24   EKG 12 lead     Value    Systolic Blood Pressure     Diastolic Blood Pressure     Ventricular Rate 43    Atrial Rate 43    CT Interval 242    QRS Duration 98        QTc 412    P Axis -27    R AXIS 13    T Axis 42    Interpretation ECG      Sinus bradycardia with 1st degree A-V block  Minimal voltage criteria for LVH, may be normal variant ( Abebe product )  Abnormal ECG  No previous ECGs available  Confirmed by - EMERGENCY ROOM, PHYSICIAN ([id number]),  JAZMYNE MCGUIRE ([id number]) on 5/20/2024 12:59:05 PM          Independent Interpretation  None  ED Course    Medications Administered  Medications   glucose gel 15-30 g (has no administration in time range)     Or   dextrose 50 % injection 25-50 mL (has no administration in time range)     Or   glucagon injection 1 mg (has no administration in time range)   dextrose 10% BOLUS 300 mL (300 mLs Intravenous $New Bag 5/20/24 1345)   sodium chloride 0.9% BOLUS 1,000 mL (1,000 mLs Intravenous $New Bag 5/20/24 1316)   calcium gluconate 1 g in 50 mL in sodium chloride intermittent infusion (1 g Intravenous $Given 5/20/24 1317)   dextrose 50 % injection 25 g (25 g Intravenous $Given 5/20/24 1338)   insulin regular 1 unit/mL injection 8.1 Units (8.1 Units Intravenous $Given 5/20/24 1342)   dextrose 50 % injection (  Not Given 5/20/24 1350)       Procedures  Procedures     Discussion of Management    Social Determinants of Health adding to complexity of care  None    ED Course     Medical Decision Making / Diagnosis   CMS Diagnoses: None    MIPS     None    MDM  Nisreen Mckenna is a 64 year old female who presents to the ED after being notified by her primary care provider to present to the emergency department.  Per the patient reports she was found to have an elevated kidney function on routine lab work.  Patient  does endorse a history of chronic kidney disease however is unable to call her recall her baseline kidney function.  Patient has a history of hypertension, type 2 diabetes, and chronic kidney disease.  I did repeat a chemistry which did show worsening renal failure.  Creatinine was 3.96 and was GFR 12.  Patient was also found to be hyperkalemic, potassium was 6.3.  This was treated with hyperkalemia protocol.  Upon physical exam the patient is awake and alert.  Heart rate at is down into the 40s.  EKG confirms sinus bradycardia with first-degree AV block.  Patient is completely asymptomatic with this.  Patient denies any chest pain, shortness of breath, nausea and/or vomiting.  Given the patient's worsening renal function, hyperkalemia, and slow heart rate I feel that this patient will most likely be needed to be admitted for further evaluation and follow-up. All questions and concerns were addressed. Patient verbalized understanding. Patient was admitted.       Disposition  The patient was admitted to the hospital.     ICD-10 Codes:    ICD-10-CM    1. Acute renal failure, unspecified acute renal failure type (H24)  N17.9       2. Hyperkalemia  E87.5            Discharge Medications  New Prescriptions    No medications on file         LURDES Kidd CNP, Casey, APRN CNP  05/20/24 2273

## 2024-05-20 NOTE — ED TRIAGE NOTES
Pt presents to ED with elevated kidney function.   Received a call from her primary MD to come into the ED. Has nephrology appt next week. Denies symptoms.      Triage Assessment (Adult)       Row Name 05/20/24 1057          Triage Assessment    Airway WDL WDL        Respiratory WDL    Respiratory WDL WDL

## 2024-05-20 NOTE — PLAN OF CARE
"ER admit. Patient settled   A&OX4. Denies pain. Echo at bedside. Tele placed. Pt states she has a history of pulmonary htn and a heart murmur.     Problem: Adult Inpatient Plan of Care  Goal: Plan of Care Review  Description: The Plan of Care Review/Shift note should be completed every shift.  The Outcome Evaluation is a brief statement about your assessment that the patient is improving, declining, or no change.  This information will be displayed automatically on your shift  note.  Outcome: Not Progressing  Flowsheets (Taken 5/20/2024 5674)  Outcome Evaluation: ER Admit. patient settled. tele on. denies pain  Plan of Care Reviewed With: patient  Overall Patient Progress: no change  Goal: Patient-Specific Goal (Individualized)  Description: You can add care plan individualizations to a care plan. Examples of Individualization might be:  \"Parent requests to be called daily at 9am for status\", \"I have a hard time hearing out of my right ear\", or \"Do not touch me to wake me up as it startles  me\".  Outcome: Not Progressing  Goal: Absence of Hospital-Acquired Illness or Injury  Outcome: Not Progressing  Goal: Optimal Comfort and Wellbeing  Outcome: Not Progressing  Goal: Readiness for Transition of Care  Outcome: Not Progressing     Problem: Acute Kidney Injury/Impairment  Goal: Improved Oral Intake  Outcome: Not Progressing  Goal: Effective Renal Function  Outcome: Not Progressing   Goal Outcome Evaluation:      Plan of Care Reviewed With: patient    Overall Patient Progress: no changeOverall Patient Progress: no change    Outcome Evaluation: ER Admit. patient settled. tele on. denies pain      "

## 2024-05-20 NOTE — PHARMACY-ADMISSION MEDICATION HISTORY
Pharmacy Intern Admission Medication History    Admission medication history is complete. The information provided in this note is only as accurate as the sources available at the time of the update.    Information Source(s): Patient and CareEverywhere/SureScripts via in-person    Pertinent Information: None    Changes made to PTA medication list:  Added: Whole list  Deleted: None  Changed: None    Allergies reviewed with patient and updates made in EHR: yes    Medication History Completed By: Aron Davis 5/20/2024 3:31 PM    PTA Med List   Medication Sig Last Dose    amLODIPine (NORVASC) 10 MG tablet Take 10 mg by mouth daily 5/20/2024 at am    atorvastatin (LIPITOR) 40 MG tablet Take 40 mg by mouth daily [place] Started at [place] Started    furosemide (LASIX) 80 MG tablet Take 1 tablet by mouth daily 5/20/2024 at am    LANTUS SOLOSTAR 100 UNIT/ML soln Inject 16 Units Subcutaneous at bedtime 5/19/2024 at pm    levothyroxine (SYNTHROID/LEVOTHROID) 175 MCG tablet Take 175 mcg by mouth daily 5/20/2024 at am    lisinopril (ZESTRIL) 30 MG tablet Take 1 tablet by mouth daily 5/20/2024 at am

## 2024-05-20 NOTE — ED NOTES
"[facility]  ED Nurse Handoff Report    ED Chief complaint: Abnormal Labs  . ED Diagnosis:   Final diagnoses:   Acute renal failure, unspecified acute renal failure type (H24)   Hyperkalemia       Allergies: No Known Allergies    Code Status: Full Code    Activity level - Baseline/Home:  independent.  Activity Level - Current:   standby.   Lift room needed: No.   Bariatric: No   Needed: No   Isolation: No.   Infection: Not Applicable.     Respiratory status: Room air    Vital Signs (within 30 minutes):   Vitals:    05/20/24 1056 05/20/24 1057 05/20/24 1215   BP:  (!) 146/60 139/58   Pulse: 59  (!) 46   Resp: 18     Temp: 98.3  F (36.8  C)     TempSrc: Oral     SpO2: 95%     Weight:  81.2 kg (179 lb)    Height:  1.575 m (5' 2\")        Cardiac Rhythm:  ,      Pain level:    Patient confused: No.   Patient Falls Risk: nonskid shoes/slippers when out of bed.   Elimination Status: Has voided     Patient Report - Initial Complaint: Sent by PCP due to abnormal labs. Patient denies any symptoms.   Focused Assessment: Nisreen Mckenna is a 64 year old female who presents to the emergency room for repeat evaluation of lab work.  Patient was notified by her primary care provider's office earlier today that her kidney function was elevated and that she was to report to the ED for further evaluation.  Patient reports that she has a history of hypertension, kidney disease, and type 2 diabetes.  Patient states that she recently moved and has been unable to get into a primary care provider's office.  She states that she was diagnosed with kidney disease by her previous provider back in [place] however is unable to recall specifically how elevated her previous kidney function was.  Patient denies any pain, nausea, vomiting, diarrhea.      Abnormal Results:   Labs Ordered and Resulted from Time of ED Arrival to Time of ED Departure   ROUTINE UA WITH MICROSCOPIC REFLEX TO CULTURE - Abnormal       " Result Value    Color Urine Straw      Appearance Urine Clear      Glucose Urine 30 (*)     Bilirubin Urine Negative      Ketones Urine Negative      Specific Gravity Urine 1.010      Blood Urine Trace (*)     pH Urine 5.5      Protein Albumin Urine 300 (*)     Urobilinogen Urine Normal      Nitrite Urine Negative      Leukocyte Esterase Urine Negative      Bacteria Urine Few (*)     Mucus Urine Present (*)     RBC Urine <1      WBC Urine 2      Squamous Epithelials Urine <1      Hyaline Casts Urine 1     BASIC METABOLIC PANEL - Abnormal    Sodium 135      Potassium 6.3 (*)     Chloride 102      Carbon Dioxide (CO2) 18 (*)     Anion Gap 15      Urea Nitrogen 68.2 (*)     Creatinine 3.96 (*)     GFR Estimate 12 (*)     Calcium 8.0 (*)     Glucose 119 (*)    CBC WITH PLATELETS AND DIFFERENTIAL - Abnormal    WBC Count 7.3      RBC Count 3.78 (*)     Hemoglobin 10.7 (*)     Hematocrit 33.1 (*)     MCV 88      MCH 28.3      MCHC 32.3      RDW 14.7      Platelet Count 258      % Neutrophils 70      % Lymphocytes 16      % Monocytes 10      % Eosinophils 2      % Basophils 1      % Immature Granulocytes 1      NRBCs per 100 WBC 0      Absolute Neutrophils 5.2      Absolute Lymphocytes 1.2      Absolute Monocytes 0.7      Absolute Eosinophils 0.2      Absolute Basophils 0.0      Absolute Immature Granulocytes 0.0      Absolute NRBCs 0.0     POTASSIUM - Abnormal    Potassium 5.9 (*)    GLUCOSE BY METER - Normal    GLUCOSE BY METER POCT 96     GLUCOSE MONITOR NURSING POCT   GLUCOSE MONITOR NURSING POCT   GLUCOSE MONITOR NURSING POCT        No orders to display       Treatments provided: Calcium gluconate infusion, dextrose injection, insulin 8.1 units, and Dextrose infusion administered per potassium protocol. NS bolus given.   Family Comments: NA  OBS brochure/video discussed/provided to patient:  No  ED Medications:   Medications   glucose gel 15-30 g (has no administration in time range)     Or   dextrose 50 % injection  25-50 mL (has no administration in time range)     Or   glucagon injection 1 mg (has no administration in time range)   dextrose 10% BOLUS 300 mL (300 mLs Intravenous $New Bag 5/20/24 1345)   sodium chloride 0.9% BOLUS 1,000 mL (1,000 mLs Intravenous $New Bag 5/20/24 1316)   calcium gluconate 1 g in 50 mL in sodium chloride intermittent infusion (1 g Intravenous $Given 5/20/24 1317)   dextrose 50 % injection 25 g (25 g Intravenous $Given 5/20/24 1338)   insulin regular 1 unit/mL injection 8.1 Units (8.1 Units Intravenous $Given 5/20/24 1342)   dextrose 50 % injection (  Not Given 5/20/24 1350)       Drips infusing:  Yes  For the majority of the shift this patient was Green.   Interventions performed were NA.    Sepsis treatment initiated: No    Cares/treatment/interventions/medications to be completed following ED care: Continue potassium protocol. Continue q30  min BG checks for 3 more checks, then q1 hr x4. Recheck K 2hrs after protocol start.     ED Nurse Name: Irma Dent RN  2:06 P  RECEIVING UNIT ED HANDOFF REVIEW    Above ED Nurse Handoff Report was reviewed: Yes  Reviewed by: [place] RN on May 20, 2024 at 2:54 PM   STEPHANIE Leonard called the ED to inform them the note was read: Yes

## 2024-05-21 PROBLEM — N17.9 ACUTE RENAL FAILURE SUPERIMPOSED ON CHRONIC KIDNEY DISEASE, UNSPECIFIED ACUTE RENAL FAILURE TYPE, UNSPECIFIED CKD STAGE (H): Status: ACTIVE | Noted: 2024-05-21

## 2024-05-21 PROBLEM — N18.9 ACUTE RENAL FAILURE SUPERIMPOSED ON CHRONIC KIDNEY DISEASE, UNSPECIFIED ACUTE RENAL FAILURE TYPE, UNSPECIFIED CKD STAGE (H): Status: ACTIVE | Noted: 2024-05-21

## 2024-05-21 LAB
ALBUMIN MFR UR ELPH: 74.3 %
ALPHA1 GLOB MFR UR ELPH: 3.7 %
ALPHA2 GLOB MFR UR ELPH: 4.8 %
ANION GAP SERPL CALCULATED.3IONS-SCNC: 12 MMOL/L (ref 7–15)
B-GLOBULIN MFR UR ELPH: 8.5 %
BUN SERPL-MCNC: 65 MG/DL (ref 8–23)
CALCIUM SERPL-MCNC: 7.7 MG/DL (ref 8.8–10.2)
CHLORIDE SERPL-SCNC: 105 MMOL/L (ref 98–107)
CREAT SERPL-MCNC: 4.05 MG/DL (ref 0.51–0.95)
DEPRECATED HCO3 PLAS-SCNC: 18 MMOL/L (ref 22–29)
EGFRCR SERPLBLD CKD-EPI 2021: 12 ML/MIN/1.73M2
FOLATE SERPL-MCNC: 8.9 NG/ML (ref 4.6–34.8)
GAMMA GLOB MFR UR ELPH: 8.7 %
GLUCOSE BLDC GLUCOMTR-MCNC: 122 MG/DL (ref 70–99)
GLUCOSE BLDC GLUCOMTR-MCNC: 124 MG/DL (ref 70–99)
GLUCOSE BLDC GLUCOMTR-MCNC: 130 MG/DL (ref 70–99)
GLUCOSE BLDC GLUCOMTR-MCNC: 94 MG/DL (ref 70–99)
GLUCOSE SERPL-MCNC: 93 MG/DL (ref 70–99)
HBV SURFACE AB SERPL IA-ACNC: <3.5 M[IU]/ML
HBV SURFACE AB SERPL IA-ACNC: NONREACTIVE M[IU]/ML
HBV SURFACE AG SERPL QL IA: NONREACTIVE
HCV AB SERPL QL IA: NONREACTIVE
HGB BLD-MCNC: 9.7 G/DL (ref 11.7–15.7)
M PROTEIN MFR UR ELPH: 0 %
PATH REPORT.COMMENTS IMP SPEC: ABNORMAL
POTASSIUM SERPL-SCNC: 5.1 MMOL/L (ref 3.4–5.3)
PROT PATTERN UR ELPH-IMP: ABNORMAL
PTH-INTACT SERPL-MCNC: 471 PG/ML (ref 15–65)
SODIUM SERPL-SCNC: 135 MMOL/L (ref 135–145)
TOTAL PROTEIN SERUM FOR ELP: 5.4 G/DL (ref 6.4–8.3)
VIT B12 SERPL-MCNC: 519 PG/ML (ref 232–1245)
VIT D+METAB SERPL-MCNC: 16 NG/ML (ref 20–50)

## 2024-05-21 PROCEDURE — 258N000003 HC RX IP 258 OP 636: Performed by: INTERNAL MEDICINE

## 2024-05-21 PROCEDURE — 82746 ASSAY OF FOLIC ACID SERUM: CPT | Performed by: PHYSICIAN ASSISTANT

## 2024-05-21 PROCEDURE — 82565 ASSAY OF CREATININE: CPT | Performed by: PHYSICIAN ASSISTANT

## 2024-05-21 PROCEDURE — 84165 PROTEIN E-PHORESIS SERUM: CPT | Mod: TC | Performed by: PATHOLOGY

## 2024-05-21 PROCEDURE — 85018 HEMOGLOBIN: CPT | Performed by: PHYSICIAN ASSISTANT

## 2024-05-21 PROCEDURE — 36415 COLL VENOUS BLD VENIPUNCTURE: CPT | Performed by: INTERNAL MEDICINE

## 2024-05-21 PROCEDURE — 250N000011 HC RX IP 250 OP 636: Performed by: INTERNAL MEDICINE

## 2024-05-21 PROCEDURE — 99232 SBSQ HOSP IP/OBS MODERATE 35: CPT | Performed by: INTERNAL MEDICINE

## 2024-05-21 PROCEDURE — 83970 ASSAY OF PARATHORMONE: CPT | Performed by: INTERNAL MEDICINE

## 2024-05-21 PROCEDURE — 250N000013 HC RX MED GY IP 250 OP 250 PS 637: Performed by: PHYSICIAN ASSISTANT

## 2024-05-21 PROCEDURE — 82306 VITAMIN D 25 HYDROXY: CPT | Performed by: INTERNAL MEDICINE

## 2024-05-21 PROCEDURE — 86335 IMMUNFIX E-PHORSIS/URINE/CSF: CPT | Performed by: PATHOLOGY

## 2024-05-21 PROCEDURE — 120N000001 HC R&B MED SURG/OB

## 2024-05-21 PROCEDURE — 86706 HEP B SURFACE ANTIBODY: CPT | Performed by: INTERNAL MEDICINE

## 2024-05-21 PROCEDURE — G0378 HOSPITAL OBSERVATION PER HR: HCPCS

## 2024-05-21 PROCEDURE — 86803 HEPATITIS C AB TEST: CPT | Performed by: INTERNAL MEDICINE

## 2024-05-21 PROCEDURE — 84165 PROTEIN E-PHORESIS SERUM: CPT | Mod: 26 | Performed by: PATHOLOGY

## 2024-05-21 PROCEDURE — 82962 GLUCOSE BLOOD TEST: CPT

## 2024-05-21 PROCEDURE — 84155 ASSAY OF PROTEIN SERUM: CPT | Performed by: INTERNAL MEDICINE

## 2024-05-21 PROCEDURE — 86335 IMMUNFIX E-PHORSIS/URINE/CSF: CPT | Mod: 26 | Performed by: PATHOLOGY

## 2024-05-21 PROCEDURE — 250N000013 HC RX MED GY IP 250 OP 250 PS 637: Performed by: INTERNAL MEDICINE

## 2024-05-21 PROCEDURE — 87340 HEPATITIS B SURFACE AG IA: CPT | Performed by: INTERNAL MEDICINE

## 2024-05-21 PROCEDURE — 99232 SBSQ HOSP IP/OBS MODERATE 35: CPT | Performed by: STUDENT IN AN ORGANIZED HEALTH CARE EDUCATION/TRAINING PROGRAM

## 2024-05-21 PROCEDURE — 82374 ASSAY BLOOD CARBON DIOXIDE: CPT | Performed by: PHYSICIAN ASSISTANT

## 2024-05-21 RX ORDER — DIPHENHYDRAMINE HYDROCHLORIDE 50 MG/ML
50 INJECTION INTRAMUSCULAR; INTRAVENOUS
Status: ACTIVE | OUTPATIENT
Start: 2024-05-21 | End: 2024-05-21

## 2024-05-21 RX ORDER — METHYLPREDNISOLONE SODIUM SUCCINATE 125 MG/2ML
125 INJECTION, POWDER, LYOPHILIZED, FOR SOLUTION INTRAMUSCULAR; INTRAVENOUS
Status: ACTIVE | OUTPATIENT
Start: 2024-05-21 | End: 2024-05-21

## 2024-05-21 RX ADMIN — SODIUM ZIRCONIUM CYCLOSILICATE 5 G: 5 POWDER, FOR SUSPENSION ORAL at 16:37

## 2024-05-21 RX ADMIN — CALCITRIOL CAPSULES 0.25 MCG 0.25 MCG: 0.25 CAPSULE ORAL at 09:36

## 2024-05-21 RX ADMIN — IRON SUCROSE 200 MG: 20 INJECTION, SOLUTION INTRAVENOUS at 16:37

## 2024-05-21 RX ADMIN — AMLODIPINE BESYLATE 10 MG: 10 TABLET ORAL at 09:36

## 2024-05-21 RX ADMIN — SODIUM BICARBONATE 650 MG TABLET 650 MG: at 21:59

## 2024-05-21 RX ADMIN — ATORVASTATIN CALCIUM 40 MG: 40 TABLET, FILM COATED ORAL at 09:36

## 2024-05-21 RX ADMIN — LEVOTHYROXINE SODIUM 175 MCG: 150 TABLET ORAL at 06:31

## 2024-05-21 RX ADMIN — SODIUM BICARBONATE 650 MG TABLET 650 MG: at 09:36

## 2024-05-21 ASSESSMENT — ACTIVITIES OF DAILY LIVING (ADL)
ADLS_ACUITY_SCORE: 23
ADLS_ACUITY_SCORE: 23
ADLS_ACUITY_SCORE: 26
ADLS_ACUITY_SCORE: 26
ADLS_ACUITY_SCORE: 23
ADLS_ACUITY_SCORE: 25
ADLS_ACUITY_SCORE: 23
ADLS_ACUITY_SCORE: 24
ADLS_ACUITY_SCORE: 25
ADLS_ACUITY_SCORE: 26
ADLS_ACUITY_SCORE: 23
ADLS_ACUITY_SCORE: 26
ADLS_ACUITY_SCORE: 23
ADLS_ACUITY_SCORE: 26
ADLS_ACUITY_SCORE: 23
ADLS_ACUITY_SCORE: 26
ADLS_ACUITY_SCORE: 23
ADLS_ACUITY_SCORE: 26
ADLS_ACUITY_SCORE: 25
ADLS_ACUITY_SCORE: 26
ADLS_ACUITY_SCORE: 26

## 2024-05-21 NOTE — PLAN OF CARE
"Shift summary ([id number])    Pt Ox4. VSS except elevated BP on RA. Denies pain. Tele SB w/ 1* AVB / BBB / tall T's, denies CP. LPIV intact, SL. Nephro following.     Goal Outcome Evaluation:      Plan of Care Reviewed With: patient    Overall Patient Progress: no changeOverall Patient Progress: no change    Outcome Evaluation: Creatinine 4.05. Elevated BP.      Problem: Adult Inpatient Plan of Care  Goal: Plan of Care Review  Description: The Plan of Care Review/Shift note should be completed every shift.  The Outcome Evaluation is a brief statement about your assessment that the patient is improving, declining, or no change.  This information will be displayed automatically on your shift  note.  Outcome: Not Progressing  Flowsheets (Taken 5/21/2024 1050)  Outcome Evaluation: Creatinine 4.05. Elevated BP.  Plan of Care Reviewed With: patient  Overall Patient Progress: no change  Goal: Patient-Specific Goal (Individualized)  Description: You can add care plan individualizations to a care plan. Examples of Individualization might be:  \"Parent requests to be called daily at 9am for status\", \"I have a hard time hearing out of my right ear\", or \"Do not touch me to wake me up as it startles  me\".  Outcome: Not Progressing  Goal: Absence of Hospital-Acquired Illness or Injury  Outcome: Not Progressing  Intervention: Identify and Manage Fall Risk  Recent Flowsheet Documentation  Taken 5/21/2024 0940 by Kylee Cardenas RN  Safety Promotion/Fall Prevention:   activity supervised   assistive device/personal items within reach   clutter free environment maintained   increased rounding and observation   increase visualization of patient   lighting adjusted   mobility aid in reach   nonskid shoes/slippers when out of bed   patient and family education   safety round/check completed   room organization consistent   supervised activity   treat reversible contributory factors   treat underlying cause  Taken 5/21/2024 0736 by " Kylee Cardenas RN  Safety Promotion/Fall Prevention: safety round/check completed  Intervention: Prevent Skin Injury  Recent Flowsheet Documentation  Taken 5/21/2024 0940 by Kylee Cardenas RN  Body Position:   position changed independently   supine, head elevated  Skin Protection:   adhesive use limited   incontinence pads utilized  Device Skin Pressure Protection:   absorbent pad utilized/changed   tubing/devices free from skin contact   adhesive use limited   pressure points protected  Intervention: Prevent and Manage VTE (Venous Thromboembolism) Risk  Recent Flowsheet Documentation  Taken 5/21/2024 0940 by Kylee Cardenas RN  VTE Prevention/Management: SCDs (sequential compression devices) off  Intervention: Prevent Infection  Recent Flowsheet Documentation  Taken 5/21/2024 0940 by Kylee Cardenas RN  Infection Prevention:   equipment surfaces disinfected   hand hygiene promoted   personal protective equipment utilized   rest/sleep promoted   single patient room provided  Goal: Optimal Comfort and Wellbeing  Outcome: Not Progressing  Goal: Readiness for Transition of Care  Outcome: Not Progressing

## 2024-05-21 NOTE — PLAN OF CARE
"/47 (BP Location: Right arm)   Pulse 50   Temp 98.5  F (36.9  C) (Oral)   Resp 16   Ht 1.575 m (5' 2\")   Wt 81.6 kg (179 lb 12.8 oz)   SpO2 92%   BMI 32.89 kg/m        Goal Outcome Evaluation:      Plan of Care Reviewed With: patient    Overall Patient Progress: improvingOverall Patient Progress: improving    Outcome Evaluation: Tele SR, SB. Hyperkalemic. Gave Lokelma. Replaced IV. Continued BG monitoring as ordered. I/Os. VSS on RA    General - AxOx4. Alert oriented decision making. Ambulates independent up to bathroom. I/Os. Creatinine levels 3.96. Lokelma for hyperkalemia. IV SL. Denies pain.      Problem: Adult Inpatient Plan of Care  Goal: Plan of Care Review  Description: The Plan of Care Review/Shift note should be completed every shift.  The Outcome Evaluation is a brief statement about your assessment that the patient is improving, declining, or no change.  This information will be displayed automatically on your shift  note.  Outcome: Progressing  Flowsheets (Taken 5/21/2024 0002)  Outcome Evaluation: Tele SR, SB. Hyperkalemic. Gave Lokelma. Replaced IV. Continued BG monitoring as ordered. I/Os. VSS on RA  Plan of Care Reviewed With: patient  Overall Patient Progress: improving  Goal: Patient-Specific Goal (Individualized)  Description: You can add care plan individualizations to a care plan. Examples of Individualization might be:  \"Parent requests to be called daily at 9am for status\", \"I have a hard time hearing out of my right ear\", or \"Do not touch me to wake me up as it startles  me\".  Outcome: Progressing  Goal: Absence of Hospital-Acquired Illness or Injury  Outcome: Progressing  Intervention: Identify and Manage Fall Risk  Recent Flowsheet Documentation  Taken 5/20/2024 1536 by Alyssa Tapia, RN  Safety Promotion/Fall Prevention:   safety round/check completed   supervised activity  Intervention: Prevent Skin Injury  Recent Flowsheet Documentation  Taken 5/20/2024 1536 by Willie" GRECIA Shah  Skin Protection: adhesive use limited  Device Skin Pressure Protection: absorbent pad utilized/changed  Intervention: Prevent and Manage VTE (Venous Thromboembolism) Risk  Recent Flowsheet Documentation  Taken 5/20/2024 1536 by Alyssa Tapia RN  VTE Prevention/Management: SCDs (sequential compression devices) off  Intervention: Prevent Infection  Recent Flowsheet Documentation  Taken 5/20/2024 1536 by Alyssa Tapia RN  Infection Prevention:   environmental surveillance performed   hand hygiene promoted   equipment surfaces disinfected  Goal: Optimal Comfort and Wellbeing  Outcome: Progressing  Goal: Readiness for Transition of Care  Outcome: Progressing  Intervention: Mutually Develop Transition Plan  Recent Flowsheet Documentation  Taken 5/20/2024 1700 by Alyssa Tapia RN  Equipment Currently Used at Home: none     Problem: Acute Kidney Injury/Impairment  Goal: Fluid and Electrolyte Balance  Outcome: Progressing  Goal: Improved Oral Intake  Outcome: Progressing  Goal: Effective Renal Function  Outcome: Progressing  Intervention: Monitor and Support Renal Function  Recent Flowsheet Documentation  Taken 5/20/2024 1536 by Alyssa Tapia, RN  Medication Review/Management: medications reviewed

## 2024-05-21 NOTE — PROGRESS NOTES
Renal Medicine Progress Note            Assessment/Plan:     64 y.o woman with advanced CKD presumed due to DM and HTN.      # CKD V: No uremic symptoms.     # Hyperkalemia: Improvement with treatment.      # Acidosis: Bicarb is below target.      # Hypertension: Okay with SBP ~ 140 for now.      # Anemia due to CKD + component of Fe def:     # Hypocalcemia: like vit D def.      # Uncontrolled DM:      Plan:  # 2 grams Na and K restricted diet. RN to print out low K diet food list.  # Lokelma 5 grams daily  # Venofer 200 mg  # Recheck renal panel next week  # I made a HFUV with on 6/5 at 11 AM ([facility], [facility], [address], [place], [phone])    I discussed the case with Dr. Donald at the patient's bedside        Interval History:     She feels better.  She is eating her lunch.  NO N/V.   Excellent urine output.             Medications and Allergies:     Current Facility-Administered Medications   Medication Dose Route Frequency Provider Last Rate Last Admin    amLODIPine (NORVASC) tablet 10 mg  10 mg Oral Daily Rosio Clayton PA-C   10 mg at 05/21/24 0936    atorvastatin (LIPITOR) tablet 40 mg  40 mg Oral Daily Rosio Clayton PA-C   40 mg at 05/21/24 0936    calcitRIOL (ROCALTROL) capsule 0.25 mcg  0.25 mcg Oral Daily Remigio Cobian MD   0.25 mcg at 05/21/24 0936    [Held by provider] insulin aspart (NovoLOG) injection (RAPID ACTING)  1-7 Units Subcutaneous TID AC Rosio Clayton PA-C        [Held by provider] insulin aspart (NovoLOG) injection (RAPID ACTING)  1-5 Units Subcutaneous At Bedtime Rosio Clayton PA-C        insulin aspart (NovoLOG) injection (RAPID ACTING)  1-3 Units Subcutaneous TID AC Handy Goodrich DO        insulin aspart (NovoLOG) injection (RAPID ACTING)  1-3 Units Subcutaneous At Bedtime Handy Goodrich DO   1 Units at 05/20/24 2246    levothyroxine (SYNTHROID/LEVOTHROID) tablet 175 mcg  175 mcg Oral QAM AC Rosio Clayton PA-C   175  "mcg at 05/21/24 0631    sodium bicarbonate tablet 650 mg  650 mg Oral BID Remigio Cobian MD   650 mg at 05/21/24 0936    sodium chloride (PF) 0.9% PF flush 3 mL  3 mL Intracatheter Q8H Rosio Clayton PA-C   3 mL at 05/21/24 1412      No Known Allergies         Physical Exam:   Vitals were reviewed   , Blood pressure (!) 144/54, pulse 63, temperature 98.3  F (36.8  C), temperature source Oral, resp. rate 16, height 1.575 m (5' 2\"), weight 81.1 kg (178 lb 14.4 oz), SpO2 96%.    Wt Readings from Last 3 Encounters:   05/21/24 81.1 kg (178 lb 14.4 oz)       Intake/Output Summary (Last 24 hours) at 5/21/2024 1419  Last data filed at 5/21/2024 1144  Gross per 24 hour   Intake 640 ml   Output 2025 ml   Net -1385 ml     GENERAL: NAD  HEENT:  Normocephalic. No gross abnormalities.  Pupils equal.  MMM.    CV: RRR, + murmurs, no clicks, gallops, or rubs, trace edema  RESP: Clear bilaterally with good efforts  GI: Abdomen obese, soft, NT. ND  MUSCULOSKELETAL: extremities trace edema.  SKIN: no suspicious lesions or rashes, dry to touch  NEURO:  Awake, alert and conversing normally.  PSYCH: mood good, affect appropriate           Data:     CBC RESULTS:     Recent Labs   Lab 05/21/24  0746 05/20/24  1141   WBC  --  7.3   RBC  --  3.78*   HGB 9.7* 10.7*   HCT  --  33.1*   PLT  --  258       Basic Metabolic Panel:  Recent Labs   Lab 05/21/24  1350 05/21/24  0908 05/21/24  0746 05/20/24  2121 05/20/24 2019 05/20/24 2010 05/20/24  1818 05/20/24  1526 05/20/24  1321 05/20/24  1309 05/20/24  1141   NA  --   --  135  --  131*  --   --  132*  --   --  135   POTASSIUM  --   --  5.1  --  5.5*  --   --  5.1  5.1  --  5.9* 6.3*   CHLORIDE  --   --  105  --  100  --   --  101  --   --  102   CO2  --   --  18*  --  20*  --   --  18*  --   --  18*   BUN  --   --  65.0*  --  59.8*  --   --  61.0*  --   --  68.2*   CR  --   --  4.05*  --  3.62*  --   --  3.77*  --   --  3.96*   * 94 93 211* 196* 198*   < > 68*   < >  --  119* "   BETH  --   --  7.7*  --  7.7*  --   --  7.9*  --   --  8.0*    < > = values in this interval not displayed.       INRNo lab results found in last 7 days.     Attestation:   I have reviewed today's relevant vital signs, notes, medications, labs and imaging.  Renal doppler:   1.  Elevated velocities at the left renal hilum, may represent tortuosity. Consider CTA or MRA for further evaluation.  2.  Elevated resistive indices bilaterally.  3.  Calcification in the left renal artery, may represent a non obstructing stone.   4.  Echogenic renal parenchyma bilaterally, suggesting medical renal disease.     Remigio Cobian MD  [facility] - Nephrology  Office phone :[phone]  Pager: [phone]

## 2024-05-21 NOTE — PLAN OF CARE
"Goal Outcome Evaluation:  Pt is alert and oriented, up ad raciel in the room. Pt able to sleep between cares. Tele reading SR, 1st degree AVB, PJCs. Nephrology consulted.      Problem: Adult Inpatient Plan of Care  Goal: Plan of Care Review  Description: The Plan of Care Review/Shift note should be completed every shift.  The Outcome Evaluation is a brief statement about your assessment that the patient is improving, declining, or no change.  This information will be displayed automatically on your shift  note.  Outcome: Progressing  Flowsheets (Taken 5/21/2024 0511)  Outcome Evaluation: awaiting am labs for current creatinine  Plan of Care Reviewed With: patient  Overall Patient Progress: improving  Goal: Patient-Specific Goal (Individualized)  Description: You can add care plan individualizations to a care plan. Examples of Individualization might be:  \"Parent requests to be called daily at 9am for status\", \"I have a hard time hearing out of my right ear\", or \"Do not touch me to wake me up as it startles  me\".  Outcome: Progressing  Goal: Absence of Hospital-Acquired Illness or Injury  Outcome: Progressing  Intervention: Identify and Manage Fall Risk  Recent Flowsheet Documentation  Taken 5/20/2024 2333 by Doreen Alvarenga, RN  Safety Promotion/Fall Prevention:   lighting adjusted   clutter free environment maintained   nonskid shoes/slippers when out of bed   safety round/check completed  Intervention: Prevent Skin Injury  Recent Flowsheet Documentation  Taken 5/20/2024 2333 by Doreen Alvarenga, RN  Body Position: position changed independently  Skin Protection: adhesive use limited  Device Skin Pressure Protection:   absorbent pad utilized/changed   adhesive use limited  Goal: Optimal Comfort and Wellbeing  Outcome: Progressing  Goal: Readiness for Transition of Care  Outcome: Progressing     Problem: Acute Kidney Injury/Impairment  Goal: Fluid and Electrolyte Balance  Outcome: Progressing  Intervention: Monitor " and Manage Fluid and Electrolyte Balance  Recent Flowsheet Documentation  Taken 5/20/2024 2333 by Doreen Alvarenga, RN  Fluid/Electrolyte Management: fluids provided  Goal: Improved Oral Intake  Outcome: Progressing  Intervention: Promote and Optimize Oral Intake  Recent Flowsheet Documentation  Taken 5/20/2024 2333 by Doreen Alvarenga, RN  Oral Nutrition Promotion: rest periods promoted  Goal: Effective Renal Function  Outcome: Progressing  Intervention: Monitor and Support Renal Function  Recent Flowsheet Documentation  Taken 5/20/2024 2333 by Doreen Alvarenga, RN  Medication Review/Management: medications reviewed

## 2024-05-21 NOTE — UTILIZATION REVIEW
"Admission Status; Secondary Review Determination     Admission Date: 5/20/2024 12:04 PM       Under the authority of the Utilization Management Committee, the utilization review process indicated a secondary review on the above patient.  The review outcome is based on review of the medical records, discussions with staff, and applying clinical experience noted on the date of the review.          (x) Observation Status Appropriate - This patient does not meet hospital inpatient criteria and is placed in observation status. If this patient's primary payer is Medicare and was admitted as an inpatient, Condition Code 44 should be used and patient status changed to \"observation\".       RATIONALE FOR DETERMINATION      Brief clinical presentation, information copied from the chart, abbreviated and edited for relevant content:     Messaged team to change to OBS. Rapid clinical improvement since admission.       Nisreen Mckenna is a 64 year old female with a history of HTN, Hypothyroidism, TARA, Obesity, LE edema, CKD, HLD who presented because she was notified by her primary care provider's office that her kidney function was elevated and that she was to report to the ED for further evaluation.  Creatinine on admission 3.96.  Creatinine last week at PCP visit 4.16.  Unclear baseline but reports a hx of CKD due to underlying Diabetes and HTN and was followed by Nephrology in the past while living in [place].  UA with no signs of infection, but reveals few bacteria, 300 protein and glucose present. Received IV hydration, renal ultrasound, nephrology consult. K was elevated to 6.3 but received S/p Calcium Gluconate, D50/Insulin and IVF bolus with resolution. Nephrology evaluated and found no uremic symptoms, with plan for Na and K restricted diet, lokemia, venofer, and outpatient follow up.       The severity of illness, intensity of cares provided, risk for adverse outcome, and expected LOS make the care appropriate for " observation.       The information on this document is developed by the utilization review team in order for the business office to ensure compliance.  This only denotes the appropriateness of proper admission status and does not reflect the quality of care rendered.         The definitions of Inpatient Status and Observation Status used in making the determination above are those provided in the CMS Coverage Manual, Chapter 1 and Chapter 6, section 70.4.      Sincerely,     Evelyne Albarran MD   Utilization Review/ Case Management  [facility].

## 2024-05-21 NOTE — PROGRESS NOTES
"[facility]    Medicine Progress Note - Hospitalist Service    Date of Admission:  5/20/2024    Assessment & Plan   Nisreen Mckenna is a 65 yo F with a history of HTN, CKD, TARA, HLD, hypothyroidism who was admitted after being notified by her PCP that her renal function was abnormal and recommended ER evaluation. Cr in ED 3.96, was 4.16 at time of PCP visit 5/15; initially unclear baseline as she recently moved here from [place] but she was following with nephrology there and notes she had \"15% kidney function\", thus likely stable advanced kidney disease.    CKD IV-V  MICHELLE on CKD vs CKD progression  Metabolic acidosis  Previously followed with nephrology in [place], admitted after abnormal labs done at PCP. She was hyperkalemic on admission to 6.3 with Cr 3.96 and BUN 68.2. No uremic symptoms. Renal disease thought to be 2/2 HTN and DM.   -nephrology following  -started sodium bicarb  -AM BMP; possible discharge in AM  -outpatient follow up with nephrology next week to discuss and prep for likely future dialysis/transplant options     Hyperkalemia  K on admission 6.3; at outpatient PCP visit it was normal.   -low potassium diet education  -start lokelma 5 g daily    Anemia, likely multifactorial including anemia of chronic renal disease + PRINCE   Patient denies any blood loss.  Has had a colonoscopy within the past 10 years she reports was unremarkable  -IV iron    Bradycardia, first degree AVB    HTN  Continue amlodipine. Discontinue lisinopril    Hx Atrial Flutter s/p Ablation  S/p ablation while living in [place] ~10 years ago.  Was previously on blood thinners.  No current blood thinners.  - monitor on telemetry     Hx Pulmonary HTN  Heart Murmur  Systolic murmur noted on exam.  Pt reports this has been known.  No hx of valvular heart disease that she is aware of.  Has chronic peripheral edema at baseline.  Known pHTN  - tte    DM Type 2  Diagnosed ~30 years ago.  Was off insulin for 4 years after " "weight loss.  Not checking blood sugars recently until seen by PCP last week where Hgb A1C 10.2 (5/15/24).  Seen by Diabetes Educator last week and started on Glargine 16 units daily.  BS on admission 119.    - will start ISS protocol  - likely resume pta Glargine in am at reduced dose for discharge     Hyperlipidemia   Last lipid panel (5/15/2024) Tchol 351, HDL 62, , Trig 217.  Was on Atorvastatin years ago, recently ordered to start again by PCP last week  - continue Atorvastatin     Hypothyroidism  TSH last week wnl.  - continue Levothyroxine     TARA  - no longer uses cpap after weight loss  - follow up with PCP to consider sleep evaluation to reassess     Obesity, BMI 32          Diet: Renal Diet (non-dialysis)    DVT Prophylaxis: Ambulate every shift  Diaz Catheter: Not present  Lines: None     Cardiac Monitoring: ACTIVE order. Indication: Bradycardias (48 hours)  Code Status: Full Code      Clinically Significant Risk Factors Present on Admission        # Hyperkalemia: Highest K = 6.3 mmol/L in last 2 days, will monitor as appropriate           # Hypertension: Home medication list includes antihypertensive(s)      # Obesity: Estimated body mass index is 32.72 kg/m  as calculated from the following:    Height as of this encounter: 1.575 m (5' 2\").    Weight as of this encounter: 81.1 kg (178 lb 14.4 oz).              Disposition Plan     Expected Discharge Date: 05/22/2024                  Dalila Potts DO  Hospitalist Service  [facility]  Securely message with [employer] (more info)  Text page via [employer] Paging/Directory   ______________________________________________________________________      Physical Exam   Vital Signs: Temp: 98.5  F (36.9  C) Temp src: Oral BP: 135/47 Pulse: 50   Resp: 16 SpO2: 92 % O2 Device: None (Room air)    Weight: 178 lbs 14.4 oz    General: Very pleasant female , NAD  Respiratory: Normal work of breathing.    Musculoskeletal: Moving all extremities " appropriately.  Skin: No rashes or abrasions on exposed skin.  Neurologic: Alert. No gross focal deficits.   Psychologic: Appropriate mood and affect.      Medical Decision Making       45 MINUTES SPENT BY ME on the date of service doing chart review, history, exam, documentation & further activities per the note.      Data     I have personally reviewed the following data over the past 24 hrs:    7.3  \   9.7 (L)   / 258     135 105 65.0 (H) /  93   5.1 18 (L) 4.05 (H) \     Trop: N/A BNP: 2,332 (H)     TSH: N/A T4: N/A A1C: 9.7 (H)       Imaging results reviewed over the past 24 hrs:   Recent Results (from the past 24 hour(s))   Echocardiogram Complete   Result Value    LVEF  >70%    Narrative    [id number]  [id number]  [id number]  [id number]^ERIN^ROHITH^S     [facility]  Echocardiography Laboratory  [address] [place] [address]     Name: KHUSHI ROBERTSON  MRN: [id number]  : 1960  Study Date: 2024 02:59 PM  Age: 64 yrs  Gender: Female  Patient Location: [facility]  Reason For Study: Cardiac Murmur  Ordering Physician: ROHITH KHAN  Performed By: Daniel Kilgore RDCS     BSA: 1.8 m2  Height: 62 in  Weight: 179 lb  HR: 51  BP: 140/59 mmHg  ______________________________________________________________________________  Procedure  Complete Portable Echo Adult.  ______________________________________________________________________________  Interpretation Summary     Hyperdynamic left ventricular function  The visual ejection fraction is >70%.  No regional wall motion abnormalities noted.  Mild valvular aortic stenosis.  The mean AoV pressure gradient is 22mmHg.  Mild chordal systolic anterior motion of mitral valve.  Dynamic LV mid ventricular and outflow obstruction noted with peak gradient of  70mm with Valsalva. The study was technically difficult. There is no  comparison study available.  ______________________________________________________________________________  Left  Ventricle  The left ventricle is normal in size. There is borderline concentric left  ventricular hypertrophy. Hyperdynamic left ventricular function. The visual  ejection fraction is >70%. Grade II or moderate diastolic dysfunction. No  regional wall motion abnormalities noted.     Right Ventricle  The right ventricle is normal size. The right ventricular systolic function is  normal.     Atria  The left atrium is moderately dilated. Right atrial size is normal.     Mitral Valve  There is moderate mitral annular calcification. There is trace to mild mitral  regurgitation.     Tricuspid Valve  There is trace tricuspid regurgitation. The right ventricular systolic  pressure is approximated at 32.2 mmHg plus the right atrial pressure.     Aortic Valve  The aortic valve is not well visualized. It is severely calcified. The mean  AoV pressure gradient is 22mmHg. Mild valvular aortic stenosis.     Pulmonic Valve  The pulmonic valve is not well visualized. There is trace pulmonic valvular  regurgitation.     Vessels  The aortic root is normal size.     Pericardium  There is no pericardial effusion.     Rhythm  Sinus rhythm was noted. The patient exhibited frequent PACs.  ______________________________________________________________________________  MMode/2D Measurements & Calculations     IVSd: 1.1 cm  LVIDd: 4.6 cm  LVIDs: 2.1 cm  LVPWd: 1.2 cm  FS: 54.5 %  LV mass(C)d: 189.1 grams  LV mass(C)dI: 103.7 grams/m2  Ao root diam: 3.3 cm  LA dimension: 4.7 cm  LA/Ao: 1.4  LVOT diam: 2.1 cm  LVOT area: 3.6 cm2  Ao root diam index Ht(cm/m): 2.1  Ao root diam index BSA (cm/m2): 1.8  LA Volume (BP): 77.5 ml  LA Volume Index (BP): 42.6 ml/m2     RV Base: 3.1 cm  RWT: 0.52  TAPSE: 2.3 cm     Doppler Measurements & Calculations  MV E max harish: 108.0 cm/sec  MV A max harish: 89.8 cm/sec  MV E/A: 1.2  MV dec slope: 426.1 cm/sec2  MV dec time: 0.25 sec  Ao V2 max: 306.7 cm/sec  Ao max P.0 mmHg  Ao V2 mean: 215.7 cm/sec  Ao mean PG:  21.6 mmHg  Ao V2 VTI: 63.2 cm  JESSIE(I,D): 2.2 cm2  JESSIE(V,D): 1.8 cm2  LV V1 max P.4 mmHg  LV V1 max: 153.0 cm/sec  LV V1 VTI: 38.9 cm  SV(LVOT): 138.7 ml  SI(LVOT): 76.1 ml/m2  TR max benedicto: 283.9 cm/sec  TR max P.2 mmHg  AV Benedicto Ratio (DI): 0.50  JESSIE Index (cm2/m2): 1.2  E/E' av.3  Lateral E/e': 15.5  Medial E/e': 17.1     RV S Benedicto: 9.0 cm/sec     ______________________________________________________________________________  Report approved by: José Miguel Dejesus 2024 03:57 PM         US Renal Complete w Arterial Duplex    Narrative    EXAM:  1. RENAL ULTRASOUND   2. RENAL DUPLEX  LOCATION: [facility]  DATE: 2024    INDICATION: CKD, HTN  COMPARISON: None.  TECHNIQUE: Duplex imaging is performed utilizing gray-scale, two-dimensional images, and color-flow imaging. Doppler waveform analysis and spectral Doppler imaging is also performed.    FINDINGS:     RIGHT KIDNEY: 11.5 x 4.9 x 5.6 cm. Normal without hydronephrosis or masses. Cystic lesion in the right superior lateral cystic lesion measuring 2.00 x 2.5 x 1.8 cm, no specific follow up needed. Echogenic renal parenchyma.     LEFT KIDNEY 11.8 x 5.3 x 5.5 cm. Normal without hydronephrosis or masses. Shadowing echogenic area measuring 1.0 cm, likely a non obstructing stone. Echogenic renal parenchyma.     BLADDER: Normal.    RENAL DUPLEX:  Aortic PSV: 99 cm/s, multiphasic    Right Renal Artery PSV: Normal, less than 200 cm/s (Normal considered less than 200 cm/s.) Right renal vein is patent.     Right Intrarenal Resistive Index: Elevated measuring 0.9 - 1.0.    Left Renal Artery PSV elevated with peak systolic velocity 220 cm/s at the renal hilum (Normal considered less than 200 cm/s.) Left renal vein is patent.     Left Intrarenal Resistive Index: Elevated measuring 0.79 - 1.0       Impression    IMPRESSION:   1.  Elevated velocities at the left renal hilum, may represent tortuosity. Consider CTA or MRA for further  evaluation.  2.  Elevated resistive indices bilaterally.  3.  Calcification in the left renal artery, may represent a non obstructing stone.   4.  Echogenic renal parenchyma bilaterally, suggesting medical renal disease.

## 2024-05-21 NOTE — PLAN OF CARE
"A & O x 4, pleasant, on RA.  Cr+ 4.05.  Nephrology following. New orders received.  Tolerating diet.    Problem: Adult Inpatient Plan of Care  Goal: Plan of Care Review  Description: The Plan of Care Review/Shift note should be completed every shift.  The Outcome Evaluation is a brief statement about your assessment that the patient is improving, declining, or no change.  This information will be displayed automatically on your shift  note.  Outcome: Progressing  Flowsheets (Taken 5/21/2024 1503)  Outcome Evaluation: Nephrology following, new orders recieved  Plan of Care Reviewed With: patient   Goal Outcome Evaluation:      Plan of Care Reviewed With: patient          Outcome Evaluation: Nephrology following, new orders recieved      Problem: Adult Inpatient Plan of Care  Goal: Plan of Care Review  Description: The Plan of Care Review/Shift note should be completed every shift.  The Outcome Evaluation is a brief statement about your assessment that the patient is improving, declining, or no change.  This information will be displayed automatically on your shift  note.  Outcome: Progressing  Flowsheets (Taken 5/21/2024 1503)  Outcome Evaluation: Nephrology following, new orders recieved  Plan of Care Reviewed With: patient     Problem: Adult Inpatient Plan of Care  Goal: Plan of Care Review  Description: The Plan of Care Review/Shift note should be completed every shift.  The Outcome Evaluation is a brief statement about your assessment that the patient is improving, declining, or no change.  This information will be displayed automatically on your shift  note.  Outcome: Progressing  Flowsheets (Taken 5/21/2024 1503)  Outcome Evaluation: Nephrology following, new orders recieved  Plan of Care Reviewed With: patient  Goal: Patient-Specific Goal (Individualized)  Description: You can add care plan individualizations to a care plan. Examples of Individualization might be:  \"Parent requests to be called daily at 9am " "for status\", \"I have a hard time hearing out of my right ear\", or \"Do not touch me to wake me up as it startles  me\".  Outcome: Progressing  Goal: Absence of Hospital-Acquired Illness or Injury  Outcome: Progressing  Goal: Optimal Comfort and Wellbeing  Outcome: Progressing  Goal: Readiness for Transition of Care  Outcome: Progressing     Problem: Acute Kidney Injury/Impairment  Goal: Fluid and Electrolyte Balance  Outcome: Progressing  Goal: Improved Oral Intake  Outcome: Progressing     "

## 2024-05-22 VITALS
RESPIRATION RATE: 18 BRPM | DIASTOLIC BLOOD PRESSURE: 39 MMHG | HEIGHT: 62 IN | SYSTOLIC BLOOD PRESSURE: 153 MMHG | BODY MASS INDEX: 32.26 KG/M2 | WEIGHT: 175.3 LBS | HEART RATE: 57 BPM | TEMPERATURE: 98 F | OXYGEN SATURATION: 95 %

## 2024-05-22 PROBLEM — N17.9 ACUTE RENAL FAILURE, UNSPECIFIED ACUTE RENAL FAILURE TYPE (H): Status: ACTIVE | Noted: 2024-05-22

## 2024-05-22 LAB
ALBUMIN SERPL ELPH-MCNC: 3 G/DL (ref 3.7–5.1)
ALPHA1 GLOB SERPL ELPH-MCNC: 0.2 G/DL (ref 0.2–0.4)
ALPHA2 GLOB SERPL ELPH-MCNC: 0.8 G/DL (ref 0.5–0.9)
ANION GAP SERPL CALCULATED.3IONS-SCNC: 14 MMOL/L (ref 7–15)
B-GLOBULIN SERPL ELPH-MCNC: 0.6 G/DL (ref 0.6–1)
BUN SERPL-MCNC: 63.1 MG/DL (ref 8–23)
CALCIUM SERPL-MCNC: 8.2 MG/DL (ref 8.8–10.2)
CHLORIDE SERPL-SCNC: 107 MMOL/L (ref 98–107)
CREAT SERPL-MCNC: 4.09 MG/DL (ref 0.51–0.95)
DEPRECATED HCO3 PLAS-SCNC: 18 MMOL/L (ref 22–29)
EGFRCR SERPLBLD CKD-EPI 2021: 11 ML/MIN/1.73M2
GAMMA GLOB SERPL ELPH-MCNC: 0.8 G/DL (ref 0.7–1.6)
GLUCOSE BLDC GLUCOMTR-MCNC: 101 MG/DL (ref 70–99)
GLUCOSE BLDC GLUCOMTR-MCNC: 141 MG/DL (ref 70–99)
GLUCOSE BLDC GLUCOMTR-MCNC: 187 MG/DL (ref 70–99)
GLUCOSE BLDC GLUCOMTR-MCNC: 97 MG/DL (ref 70–99)
GLUCOSE SERPL-MCNC: 99 MG/DL (ref 70–99)
M PROTEIN SERPL ELPH-MCNC: 0 G/DL
PATH REPORT.COMMENTS IMP SPEC: ABNORMAL
POTASSIUM SERPL-SCNC: 4.4 MMOL/L (ref 3.4–5.3)
PROT PATTERN SERPL ELPH-IMP: ABNORMAL
SODIUM SERPL-SCNC: 139 MMOL/L (ref 135–145)

## 2024-05-22 PROCEDURE — 99239 HOSP IP/OBS DSCHRG MGMT >30: CPT | Performed by: STUDENT IN AN ORGANIZED HEALTH CARE EDUCATION/TRAINING PROGRAM

## 2024-05-22 PROCEDURE — 250N000013 HC RX MED GY IP 250 OP 250 PS 637: Performed by: STUDENT IN AN ORGANIZED HEALTH CARE EDUCATION/TRAINING PROGRAM

## 2024-05-22 PROCEDURE — 250N000013 HC RX MED GY IP 250 OP 250 PS 637: Performed by: INTERNAL MEDICINE

## 2024-05-22 PROCEDURE — 250N000013 HC RX MED GY IP 250 OP 250 PS 637: Performed by: PHYSICIAN ASSISTANT

## 2024-05-22 PROCEDURE — 82962 GLUCOSE BLOOD TEST: CPT

## 2024-05-22 PROCEDURE — 99232 SBSQ HOSP IP/OBS MODERATE 35: CPT | Performed by: INTERNAL MEDICINE

## 2024-05-22 PROCEDURE — 80048 BASIC METABOLIC PNL TOTAL CA: CPT | Performed by: STUDENT IN AN ORGANIZED HEALTH CARE EDUCATION/TRAINING PROGRAM

## 2024-05-22 PROCEDURE — 36415 COLL VENOUS BLD VENIPUNCTURE: CPT | Performed by: STUDENT IN AN ORGANIZED HEALTH CARE EDUCATION/TRAINING PROGRAM

## 2024-05-22 RX ORDER — SODIUM BICARBONATE 650 MG/1
650 TABLET ORAL 2 TIMES DAILY
Qty: 60 TABLET | Refills: 0 | Status: SHIPPED | OUTPATIENT
Start: 2024-05-22

## 2024-05-22 RX ORDER — CALCITRIOL 0.25 UG/1
0.25 CAPSULE, LIQUID FILLED ORAL DAILY
Qty: 30 CAPSULE | Refills: 0 | Status: SHIPPED | OUTPATIENT
Start: 2024-05-23

## 2024-05-22 RX ORDER — HYDRALAZINE HYDROCHLORIDE 25 MG/1
12.5 TABLET, FILM COATED ORAL 2 TIMES DAILY
Qty: 60 TABLET | Refills: 0 | Status: SHIPPED | OUTPATIENT
Start: 2024-05-22 | End: 2024-06-19

## 2024-05-22 RX ADMIN — HYDRALAZINE HYDROCHLORIDE 12.5 MG: 25 TABLET, FILM COATED ORAL at 13:47

## 2024-05-22 RX ADMIN — AMLODIPINE BESYLATE 10 MG: 10 TABLET ORAL at 09:07

## 2024-05-22 RX ADMIN — SODIUM ZIRCONIUM CYCLOSILICATE 5 G: 5 POWDER, FOR SUSPENSION ORAL at 09:06

## 2024-05-22 RX ADMIN — LEVOTHYROXINE SODIUM 175 MCG: 150 TABLET ORAL at 09:07

## 2024-05-22 RX ADMIN — SODIUM BICARBONATE 650 MG TABLET 650 MG: at 09:07

## 2024-05-22 RX ADMIN — CALCITRIOL CAPSULES 0.25 MCG 0.25 MCG: 0.25 CAPSULE ORAL at 09:07

## 2024-05-22 RX ADMIN — ATORVASTATIN CALCIUM 40 MG: 40 TABLET, FILM COATED ORAL at 09:07

## 2024-05-22 ASSESSMENT — ACTIVITIES OF DAILY LIVING (ADL)
ADLS_ACUITY_SCORE: 25

## 2024-05-22 NOTE — PROVIDER NOTIFICATION
Notified by tele tech that pt had an episode of mak down to 29 very briefly and back up to the 50s. Pt asymptomatic. Am rounder Dr. Potts notified by diane romero.

## 2024-05-22 NOTE — PROGRESS NOTES
Renal Medicine Progress Note            Assessment/Plan:     64 y.o woman with advanced CKD presumed due to DM and HTN.      # CKD V: No uremic symptoms.     # Hyperkalemia: Improvement with treatment.      # Acidosis: Bicarb is below target.      # Hypertension: Okay with SBP ~ 140 for now.    -norvasc     # Anemia due to CKD + component of Fe def:     # Hypocalcemia: like vit D def.      # Uncontrolled DM:      Plan:  # I made a HFUV with on 6/5 at 11 AM ([facility], [address] [place] [address], [address], [place], [phone])   # Check renal panel next week.   # Continue sodium bicarbonate  # Continue calcitriol  # Continue Lokelma  # Adding [id number] mg bid  # 2 grams Na and K restricted diet    I discussed the risks of advanced renal failure with her including sudden death particularly from hyperkalemia. I discussed the various uremic symptoms and timing of RRT with her.           Interval History:     Afebrile. DBP is on the low side  Pt does not have questions or concerns.   No uremic symptoms  Kidney function is stable.             Medications and Allergies:     Current Facility-Administered Medications   Medication Dose Route Frequency Provider Last Rate Last Admin    amLODIPine (NORVASC) tablet 10 mg  10 mg Oral Daily Rosio Clayton PA-C   10 mg at 05/22/24 0907    atorvastatin (LIPITOR) tablet 40 mg  40 mg Oral Daily Rosio Clayton PA-C   40 mg at 05/22/24 0907    calcitRIOL (ROCALTROL) capsule 0.25 mcg  0.25 mcg Oral Daily Remigio Cobian MD   0.25 mcg at 05/22/24 0907    [Held by provider] insulin aspart (NovoLOG) injection (RAPID ACTING)  1-7 Units Subcutaneous TID AC Rosio Clayton PA-C        [Held by provider] insulin aspart (NovoLOG) injection (RAPID ACTING)  1-5 Units Subcutaneous At Bedtime Rosio Clayton PA-C        insulin aspart (NovoLOG) injection (RAPID ACTING)  1-3 Units Subcutaneous TID AC Handy Goodrich DO        insulin aspart (NovoLOG) injection (RAPID  "ACTING)  1-3 Units Subcutaneous At Bedtime Handy Goodrich DO   1 Units at 05/20/24 2246    levothyroxine (SYNTHROID/LEVOTHROID) tablet 175 mcg  175 mcg Oral QAM AC Rosio Clayton PA-C   175 mcg at 05/22/24 0907    sodium bicarbonate tablet 650 mg  650 mg Oral BID Remigio Cobian MD   650 mg at 05/22/24 0907    sodium chloride (PF) 0.9% PF flush 3 mL  3 mL Intracatheter Q8H Rosio Clayton PA-C   3 mL at 05/22/24 0908    sodium zirconium cyclosilicate (LOKELMA) packet 5 g  5 g Oral Daily Dalila Potts DO   5 g at 05/22/24 0906      No Known Allergies         Physical Exam:   Vitals were reviewed   , Blood pressure (!) 159/55, pulse 65, temperature 98.1  F (36.7  C), temperature source Oral, resp. rate 18, height 1.575 m (5' 2\"), weight 79.5 kg (175 lb 4.8 oz), SpO2 93%.    Wt Readings from Last 3 Encounters:   05/22/24 79.5 kg (175 lb 4.8 oz)       Intake/Output Summary (Last 24 hours) at 5/22/2024 1203  Last data filed at 5/22/2024 1000  Gross per 24 hour   Intake 1683 ml   Output 2500 ml   Net -817 ml     GENERAL: NAD  HEENT:  Normocephalic. No gross abnormalities.  Pupils equal.  MMM.    CV: RRR, + murmurs, no clicks, gallops, or rubs, trace edema  RESP: Clear bilaterally with good efforts  GI: Abdomen obese, soft, NT. ND  MUSCULOSKELETAL: extremities trace edema.  SKIN: no suspicious lesions or rashes, dry to touch  NEURO:  Awake, alert and conversing normally.  PSYCH: mood good, affect appropriate         Data:     CBC RESULTS:     Recent Labs   Lab 05/21/24  0746 05/20/24  1141   WBC  --  7.3   RBC  --  3.78*   HGB 9.7* 10.7*   HCT  --  33.1*   PLT  --  258       Basic Metabolic Panel:  Recent Labs   Lab 05/22/24  0843 05/22/24  0750 05/22/24  0057 05/21/24  2132 05/21/24  1738 05/21/24  1350 05/21/24  0908 05/21/24  0746 05/20/24 2121 05/20/24  2019 05/20/24  1818 05/20/24  1526 05/20/24  1321 05/20/24  1309 05/20/24  1141   NA  --  139  --   --   --   --   --  135  --  131*  --  132*  --   " --  135   POTASSIUM  --  4.4  --   --   --   --   --  5.1  --  5.5*  --  5.1  5.1  --  5.9* 6.3*   CHLORIDE  --  107  --   --   --   --   --  105  --  100  --  101  --   --  102   CO2  --  18*  --   --   --   --   --  18*  --  20*  --  18*  --   --  18*   BUN  --  63.1*  --   --   --   --   --  65.0*  --  59.8*  --  61.0*  --   --  68.2*   CR  --  4.09*  --   --   --   --   --  4.05*  --  3.62*  --  3.77*  --   --  3.96*   GLC 97 99 101* 122* 130* 124*   < > 93   < > 196*   < > 68*   < >  --  119*   BETH  --  8.2*  --   --   --   --   --  7.7*  --  7.7*  --  7.9*  --   --  8.0*    < > = values in this interval not displayed.       INRNo lab results found in last 7 days.   Attestation:   I have reviewed today's relevant vital signs, notes, medications, labs and imaging.    Remigio Cobian MD  [facility] - Nephrology  Office phone :[phone]  Pager: [phone]

## 2024-05-22 NOTE — PLAN OF CARE
"PRIMARY DIAGNOSIS: MICHELLE  OUTPATIENT/OBSERVATION GOALS TO BE MET BEFORE DISCHARGE:  ADLs back to baseline: Yes    Activity and level of assistance: Ambulating independently.    Pain status: Pain free.    Return to near baseline physical activity: Yes     Discharge Planner Nurse   Safe discharge environment identified: Yes  Barriers to discharge: No       Entered by: Doreen Alvarenga RN 05/22/2024 4:09 AM     Please review provider order for any additional goals.   Nurse to notify provider when observation goals have been met and patient is ready for discharge.  Problem: Adult Inpatient Plan of Care  Goal: Plan of Care Review  Description: The Plan of Care Review/Shift note should be completed every shift.  The Outcome Evaluation is a brief statement about your assessment that the patient is improving, declining, or no change.  This information will be displayed automatically on your shift  note.  Outcome: Progressing  Flowsheets (Taken 5/22/2024 0408)  Outcome Evaluation: creatinine up, but stable per nephrology  Plan of Care Reviewed With: patient  Overall Patient Progress: improving  Goal: Patient-Specific Goal (Individualized)  Description: You can add care plan individualizations to a care plan. Examples of Individualization might be:  \"Parent requests to be called daily at 9am for status\", \"I have a hard time hearing out of my right ear\", or \"Do not touch me to wake me up as it startles  me\".  Outcome: Progressing  Goal: Absence of Hospital-Acquired Illness or Injury  Outcome: Progressing  Intervention: Identify and Manage Fall Risk  Recent Flowsheet Documentation  Taken 5/22/2024 0100 by Doreen Alvarenga RN  Safety Promotion/Fall Prevention:   clutter free environment maintained   lighting adjusted   nonskid shoes/slippers when out of bed   safety round/check completed  Intervention: Prevent Skin Injury  Recent Flowsheet Documentation  Taken 5/22/2024 0100 by Doreen Alvarenga RN  Skin Protection: adhesive " use limited  Device Skin Pressure Protection:   absorbent pad utilized/changed   adhesive use limited  Goal: Optimal Comfort and Wellbeing  Outcome: Progressing  Goal: Readiness for Transition of Care  Outcome: Progressing     Problem: Acute Kidney Injury/Impairment  Goal: Fluid and Electrolyte Balance  Outcome: Progressing  Intervention: Monitor and Manage Fluid and Electrolyte Balance  Recent Flowsheet Documentation  Taken 5/22/2024 0100 by Doreen Alvarenga RN  Fluid/Electrolyte Management: fluids provided  Goal: Improved Oral Intake  Outcome: Progressing  Goal: Effective Renal Function  Outcome: Progressing  Intervention: Monitor and Support Renal Function  Recent Flowsheet Documentation  Taken 5/22/2024 0100 by Doreen Alvarenga RN  Medication Review/Management: medications reviewed   Goal Outcome Evaluation:      Plan of Care Reviewed With: patient    Overall Patient Progress: improvingOverall Patient Progress: improving    Outcome Evaluation: creatinine up, but stable per nephrology

## 2024-05-22 NOTE — PLAN OF CARE
"Goal Outcome Evaluation:      Plan of Care Reviewed With: patient          Outcome Evaluation: creatine 4.9    PRIMARY DIAGNOSIS: \"Hyperkalemia\"  OUTPATIENT/OBSERVATION GOALS TO BE MET BEFORE DISCHARGE:  ADLs back to baseline: Yes    Activity and level of assistance: Ambulating independently.    Pain status: Pain free.    Return to near baseline physical activity: Yes     Discharge Planner Nurse   Safe discharge environment identified: Yes  Barriers to discharge: Yes       Entered by: Patricia Mujica RN 05/22/2024 08:00am   Pt. A&O, independent for mobility, denied pain, incontinent of urine, wears brief, on BG check, ate 100% of her meals, voided adequately, CMS intact, plan to go home today.  BP (!) 159/55 (BP Location: Right arm)   Pulse 65   Temp 98.1  F (36.7  C) (Oral)   Resp 18   Ht 1.575 m (5' 2\")   Wt 79.5 kg (175 lb 4.8 oz)   SpO2 93%   BMI 32.06 kg/m     Problem: Adult Inpatient Plan of Care  Goal: Plan of Care Review  Description: The Plan of Care Review/Shift note should be completed every shift.  The Outcome Evaluation is a brief statement about your assessment that the patient is improving, declining, or no change.  This information will be displayed automatically on your shift  note.  Outcome: Not Progressing  Flowsheets (Taken 5/22/2024 1115)  Outcome Evaluation: creatine 4.9  Plan of Care Reviewed With: patient  Goal: Patient-Specific Goal (Individualized)  Description: You can add care plan individualizations to a care plan. Examples of Individualization might be:  \"Parent requests to be called daily at 9am for status\", \"I have a hard time hearing out of my right ear\", or \"Do not touch me to wake me up as it startles  me\".  Outcome: Not Progressing  Goal: Absence of Hospital-Acquired Illness or Injury  Outcome: Not Progressing  Intervention: Identify and Manage Fall Risk  Recent Flowsheet Documentation  Taken 5/22/2024 1000 by Patricia Mujica RN  Safety Promotion/Fall Prevention:   " clutter free environment maintained   safety round/check completed   room organization consistent  Intervention: Prevent Skin Injury  Recent Flowsheet Documentation  Taken 5/22/2024 1000 by Patricia Mujica RN  Body Position: position changed independently  Skin Protection: adhesive use limited  Device Skin Pressure Protection:   absorbent pad utilized/changed   adhesive use limited  Intervention: Prevent and Manage VTE (Venous Thromboembolism) Risk  Recent Flowsheet Documentation  Taken 5/22/2024 1000 by Patricia Mujica RN  VTE Prevention/Management: SCDs (sequential compression devices) off  Intervention: Prevent Infection  Recent Flowsheet Documentation  Taken 5/22/2024 1000 by Patricia Mujica RN  Infection Prevention:   rest/sleep promoted   single patient room provided  Goal: Optimal Comfort and Wellbeing  Outcome: Not Progressing  Goal: Readiness for Transition of Care  Outcome: Not Progressing  Flowsheets (Taken 5/22/2024 1115)  Anticipated Changes Related to Illness: inability to care for self  Transportation Anticipated: family or friend will provide  Concerns to be Addressed:   home safety   cognitive/perceptual   patient refuses services  Barriers to Discharge: creatinine 4.9  Intervention: Mutually Develop Transition Plan  Recent Flowsheet Documentation  Taken 5/22/2024 1115 by Patricia Mujica RN  Anticipated Changes Related to Illness: inability to care for self  Transportation Anticipated: family or friend will provide  Concerns to be Addressed:   home safety   cognitive/perceptual   patient refuses services     Problem: Acute Kidney Injury/Impairment  Goal: Fluid and Electrolyte Balance  Outcome: Not Progressing  Intervention: Monitor and Manage Fluid and Electrolyte Balance  Recent Flowsheet Documentation  Taken 5/22/2024 1000 by Patricia Mujica RN  Fluid/Electrolyte Management: fluids provided  Goal: Improved Oral Intake  Outcome: Not Progressing  Intervention: Promote and  Optimize Oral Intake  Recent Flowsheet Documentation  Taken 5/22/2024 1000 by Patricia Mujica RN  Oral Nutrition Promotion: rest periods promoted  Goal: Effective Renal Function  Outcome: Not Progressing  Intervention: Monitor and Support Renal Function  Recent Flowsheet Documentation  Taken 5/22/2024 1000 by Patricia Mujica RN  Medication Review/Management: medications reviewed     Problem: Comorbidity Management  Goal: Blood Glucose Levels Within Targeted Range  Outcome: Not Progressing  Intervention: Monitor and Manage Glycemia  Recent Flowsheet Documentation  Taken 5/22/2024 1000 by Patricia Mujica RN  Medication Review/Management: medications reviewed  Goal: Blood Pressure in Desired Range  Outcome: Not Progressing  Intervention: Maintain Blood Pressure Management  Recent Flowsheet Documentation  Taken 5/22/2024 1000 by Patricia Mujica RN  Medication Review/Management: medications reviewed     Please review provider order for any additional goals.   Nurse to notify provider when observation goals have been met and patient is ready for discharge.

## 2024-05-22 NOTE — PLAN OF CARE
"Goal Outcome Evaluation:      Plan of Care Reviewed With: patient      Pt. A&O, independent for mobility, denied pain, on BG check  covered with insuline,pt. Has Clyde B.P. hydralazine was given, CMS intact, voided adequatelyPRIMARY DIAGNOSIS:    OUTPATIENT/OBSERVATION GOALS TO BE MET BEFORE DISCHARGE:  ADLs back to baseline: Yes    Activity and level of assistance: Ambulating independently.    Pain status: Pain free.    Return to near baseline physical activity: Yes     Discharge Planner Nurse   Safe discharge environment identified: Yes  Barriers to discharge: No       Entered by: Patricia Mujica RN 05/22/2024 12:30pm   Pt will go home today.  Problem: Adult Inpatient Plan of Care  Goal: Plan of Care Review  Description: The Plan of Care Review/Shift note should be completed every shift.  The Outcome Evaluation is a brief statement about your assessment that the patient is improving, declining, or no change.  This information will be displayed automatically on your shift  note.  5/22/2024 1445 by Patricia Mujica RN  Outcome: Progressing  Flowsheets (Taken 5/22/2024 1445)  Plan of Care Reviewed With: patient  5/22/2024 1115 by Patricia Mujica RN  Outcome: Not Progressing  Flowsheets (Taken 5/22/2024 1115)  Outcome Evaluation: creatine 4.9  Plan of Care Reviewed With: patient  Goal: Patient-Specific Goal (Individualized)  Description: You can add care plan individualizations to a care plan. Examples of Individualization might be:  \"Parent requests to be called daily at 9am for status\", \"I have a hard time hearing out of my right ear\", or \"Do not touch me to wake me up as it startles  me\".  5/22/2024 1445 by Patricia Mujica RN  Outcome: Progressing  5/22/2024 1115 by Patricia Mujica RN  Outcome: Not Progressing  Goal: Absence of Hospital-Acquired Illness or Injury  5/22/2024 1445 by Patricia Mujica RN  Outcome: Progressing  5/22/2024 1115 by Patricia Mujica RN  Outcome: Not " Progressing  Intervention: Identify and Manage Fall Risk  Recent Flowsheet Documentation  Taken 5/22/2024 1000 by Patricia Mujica RN  Safety Promotion/Fall Prevention:   clutter free environment maintained   safety round/check completed   room organization consistent  Intervention: Prevent Skin Injury  Recent Flowsheet Documentation  Taken 5/22/2024 1000 by Patricia Mujica RN  Body Position: position changed independently  Skin Protection: adhesive use limited  Device Skin Pressure Protection:   absorbent pad utilized/changed   adhesive use limited  Intervention: Prevent and Manage VTE (Venous Thromboembolism) Risk  Recent Flowsheet Documentation  Taken 5/22/2024 1000 by Patricia Mujica RN  VTE Prevention/Management: SCDs (sequential compression devices) off  Intervention: Prevent Infection  Recent Flowsheet Documentation  Taken 5/22/2024 1000 by Patricia Mujica RN  Infection Prevention:   rest/sleep promoted   single patient room provided  Goal: Optimal Comfort and Wellbeing  5/22/2024 1445 by Patricia Mujica RN  Outcome: Progressing  5/22/2024 1115 by Patricia Mujica RN  Outcome: Not Progressing  Goal: Readiness for Transition of Care  5/22/2024 1445 by Patricia Mujica RN  Outcome: Progressing  Flowsheets (Taken 5/22/2024 1445)  Transportation Anticipated: family or friend will provide  Concerns to be Addressed:   all concerns addressed in this encounter   denies needs/concerns at this time   patient refuses services  Barriers to Discharge: this creatinine is base line for this patient, per MD note.  5/22/2024 1115 by Patricia Mujica RN  Outcome: Not Progressing  Flowsheets (Taken 5/22/2024 1115)  Anticipated Changes Related to Illness: inability to care for self  Transportation Anticipated: family or friend will provide  Concerns to be Addressed:   home safety   cognitive/perceptual   patient refuses services  Barriers to Discharge: creatinine 4.9  Intervention: Mutually Develop  Transition Plan  Recent Flowsheet Documentation  Taken 5/22/2024 1445 by Patricia Mujica RN  Transportation Anticipated: family or friend will provide  Concerns to be Addressed:   all concerns addressed in this encounter   denies needs/concerns at this time   patient refuses services  Taken 5/22/2024 1115 by Patricia Mujica RN  Anticipated Changes Related to Illness: inability to care for self  Transportation Anticipated: family or friend will provide  Concerns to be Addressed:   home safety   cognitive/perceptual   patient refuses services     Problem: Acute Kidney Injury/Impairment  Goal: Fluid and Electrolyte Balance  5/22/2024 1445 by Patricia Mujica RN  Outcome: Progressing  5/22/2024 1115 by Patricia Mujica RN  Outcome: Not Progressing  Intervention: Monitor and Manage Fluid and Electrolyte Balance  Recent Flowsheet Documentation  Taken 5/22/2024 1000 by Patricia Mujica RN  Fluid/Electrolyte Management: fluids provided  Goal: Improved Oral Intake  5/22/2024 1445 by Patricia Mujica RN  Outcome: Progressing  5/22/2024 1115 by Patricia Mujica RN  Outcome: Not Progressing  Intervention: Promote and Optimize Oral Intake  Recent Flowsheet Documentation  Taken 5/22/2024 1000 by Patricia Mujica RN  Oral Nutrition Promotion: rest periods promoted  Goal: Effective Renal Function  5/22/2024 1445 by Patricia Mujica RN  Outcome: Progressing  5/22/2024 1115 by Patricia Mujica RN  Outcome: Not Progressing  Intervention: Monitor and Support Renal Function  Recent Flowsheet Documentation  Taken 5/22/2024 1000 by Patricia Mujica RN  Medication Review/Management: medications reviewed     Problem: Comorbidity Management  Goal: Blood Glucose Levels Within Targeted Range  5/22/2024 1445 by Patricia Mujica RN  Outcome: Progressing  5/22/2024 1115 by Patricia Mujica RN  Outcome: Not Progressing  Intervention: Monitor and Manage Glycemia  Recent Flowsheet Documentation  Taken  "5/22/2024 1000 by Patricia Mujica RN  Medication Review/Management: medications reviewed  Goal: Blood Pressure in Desired Range  5/22/2024 1445 by Patricia Mujica RN  Outcome: Progressing  5/22/2024 1115 by Patricia Mujica RN  Outcome: Not Progressing  Intervention: Maintain Blood Pressure Management  Recent Flowsheet Documentation  Taken 5/22/2024 1000 by Patricia Mujica RN  Medication Review/Management: medications reviewed     Please review provider order for any additional goals.   Nurse to notify provider when observation goals have been met and patient is ready for discharge., ate 100% of her meal.    BP (!) 157/50   Pulse 58   Temp 98.4  F (36.9  C) (Oral)   Resp 18   Ht 1.575 m (5' 2\")   Wt 79.5 kg (175 lb 4.8 oz)   SpO2 94%   BMI 32.06 kg/m       Outcome Evaluation: creatine 4.9      "

## 2024-05-22 NOTE — PLAN OF CARE
"AO x4. Denies pain. Tele: SR, 1* AVB, tall Ts. B, 122. Urine sample sent to lab. Independent. Plan: nephrology following, continue POC        Goal Outcome Evaluation:      Plan of Care Reviewed With: patient    Overall Patient Progress: no changeOverall Patient Progress: no change    Outcome Evaluation: Nephrology following, IV iron given      Problem: Adult Inpatient Plan of Care  Goal: Plan of Care Review  Description: The Plan of Care Review/Shift note should be completed every shift.  The Outcome Evaluation is a brief statement about your assessment that the patient is improving, declining, or no change.  This information will be displayed automatically on your shift  note.  Outcome: Progressing  Flowsheets (Taken 2024)  Outcome Evaluation: Nephrology following, IV iron given  Plan of Care Reviewed With: patient  Overall Patient Progress: no change  Goal: Patient-Specific Goal (Individualized)  Description: You can add care plan individualizations to a care plan. Examples of Individualization might be:  \"Parent requests to be called daily at 9am for status\", \"I have a hard time hearing out of my right ear\", or \"Do not touch me to wake me up as it startles  me\".  Outcome: Progressing  Goal: Absence of Hospital-Acquired Illness or Injury  Outcome: Progressing  Intervention: Identify and Manage Fall Risk  Recent Flowsheet Documentation  Taken 2024 by Taylor Velasquez RN  Safety Promotion/Fall Prevention:   safety round/check completed   room organization consistent   room door open   nonskid shoes/slippers when out of bed   lighting adjusted  Intervention: Prevent Skin Injury  Recent Flowsheet Documentation  Taken 2024 by Taylor Velasquez RN  Body Position: position changed independently  Intervention: Prevent Infection  Recent Flowsheet Documentation  Taken 2024 164 by Taylor Velasquez RN  Infection Prevention:   rest/sleep promoted   single patient room provided  Goal: Optimal " Comfort and Wellbeing  Outcome: Progressing  Goal: Readiness for Transition of Care  Outcome: Progressing     Problem: Acute Kidney Injury/Impairment  Goal: Fluid and Electrolyte Balance  Outcome: Progressing  Goal: Improved Oral Intake  Outcome: Progressing  Goal: Effective Renal Function  Outcome: Progressing  Intervention: Monitor and Support Renal Function  Recent Flowsheet Documentation  Taken 5/21/2024 1649 by Taylor Velasquez RN  Medication Review/Management: medications reviewed

## 2024-05-22 NOTE — PLAN OF CARE
"  Problem: Adult Inpatient Plan of Care  Goal: Plan of Care Review  Description: The Plan of Care Review/Shift note should be completed every shift.  The Outcome Evaluation is a brief statement about your assessment that the patient is improving, declining, or no change.  This information will be displayed automatically on your shift  note.  5/22/2024 1651 by Patricia Mujica RN  Outcome: Met  5/22/2024 1445 by Patricia Mujica RN  Outcome: Progressing  Flowsheets (Taken 5/22/2024 1445)  Plan of Care Reviewed With: patient  5/22/2024 1115 by Patricia Mujica RN  Outcome: Not Progressing  Flowsheets (Taken 5/22/2024 1115)  Outcome Evaluation: creatine 4.9  Plan of Care Reviewed With: patient  Goal: Patient-Specific Goal (Individualized)  Description: You can add care plan individualizations to a care plan. Examples of Individualization might be:  \"Parent requests to be called daily at 9am for status\", \"I have a hard time hearing out of my right ear\", or \"Do not touch me to wake me up as it startles  me\".  5/22/2024 1651 by Patricia Mujica RN  Outcome: Met  5/22/2024 1445 by Patricia Mujica RN  Outcome: Progressing  5/22/2024 1115 by Patricia Mujica RN  Outcome: Not Progressing  Goal: Absence of Hospital-Acquired Illness or Injury  5/22/2024 1651 by Patricia Mujica RN  Outcome: Met  5/22/2024 1445 by Patricia Mujica RN  Outcome: Progressing  5/22/2024 1115 by Patricia Mujica RN  Outcome: Not Progressing  Intervention: Identify and Manage Fall Risk  Recent Flowsheet Documentation  Taken 5/22/2024 1000 by Patricia Mujica RN  Safety Promotion/Fall Prevention:   clutter free environment maintained   safety round/check completed   room organization consistent  Intervention: Prevent Skin Injury  Recent Flowsheet Documentation  Taken 5/22/2024 1000 by Patricia Mujica RN  Body Position: position changed independently  Skin Protection: adhesive use limited  Device Skin Pressure " Protection:   absorbent pad utilized/changed   adhesive use limited  Intervention: Prevent and Manage VTE (Venous Thromboembolism) Risk  Recent Flowsheet Documentation  Taken 5/22/2024 1000 by Patricia Mujica RN  VTE Prevention/Management: SCDs (sequential compression devices) off  Intervention: Prevent Infection  Recent Flowsheet Documentation  Taken 5/22/2024 1000 by Patricia Mujica RN  Infection Prevention:   rest/sleep promoted   single patient room provided  Goal: Optimal Comfort and Wellbeing  5/22/2024 1651 by Patricia Mujica RN  Outcome: Met  5/22/2024 1445 by Patricia Mujica RN  Outcome: Progressing  5/22/2024 1115 by aPtricia Mujica RN  Outcome: Not Progressing  Goal: Readiness for Transition of Care  5/22/2024 1651 by Patricia Mujica RN  Outcome: Met  5/22/2024 1445 by Patricia Mujica RN  Outcome: Progressing  Flowsheets (Taken 5/22/2024 1445)  Transportation Anticipated: family or friend will provide  Concerns to be Addressed:   all concerns addressed in this encounter   denies needs/concerns at this time   patient refuses services  Barriers to Discharge: this creatinine is base line for this patient, per MD note.  5/22/2024 1115 by Patricia Mujica RN  Outcome: Not Progressing  Flowsheets (Taken 5/22/2024 1115)  Anticipated Changes Related to Illness: inability to care for self  Transportation Anticipated: family or friend will provide  Concerns to be Addressed:   home safety   cognitive/perceptual   patient refuses services  Barriers to Discharge: creatinine 4.9  Intervention: Mutually Develop Transition Plan  Recent Flowsheet Documentation  Taken 5/22/2024 1445 by Patricia Mujica RN  Transportation Anticipated: family or friend will provide  Concerns to be Addressed:   all concerns addressed in this encounter   denies needs/concerns at this time   patient refuses services  Taken 5/22/2024 1115 by Patricia Mujica RN  Anticipated Changes Related to Illness:  inability to care for self  Transportation Anticipated: family or friend will provide  Concerns to be Addressed:   home safety   cognitive/perceptual   patient refuses services     Problem: Acute Kidney Injury/Impairment  Goal: Fluid and Electrolyte Balance  5/22/2024 1651 by Patricia Mujica RN  Outcome: Met  5/22/2024 1445 by Patricia Mujica RN  Outcome: Progressing  5/22/2024 1115 by Patricia Mujica RN  Outcome: Not Progressing  Intervention: Monitor and Manage Fluid and Electrolyte Balance  Recent Flowsheet Documentation  Taken 5/22/2024 1000 by Patricia Mujica RN  Fluid/Electrolyte Management: fluids provided  Goal: Improved Oral Intake  5/22/2024 1651 by Patricia Mujica RN  Outcome: Met  5/22/2024 1445 by Patricia Mujica RN  Outcome: Progressing  5/22/2024 1115 by Patricia Mujica RN  Outcome: Not Progressing  Intervention: Promote and Optimize Oral Intake  Recent Flowsheet Documentation  Taken 5/22/2024 1000 by Patricia Mujica RN  Oral Nutrition Promotion: rest periods promoted  Goal: Effective Renal Function  5/22/2024 1651 by Patricia Mujica RN  Outcome: Met  5/22/2024 1445 by Patricia Mujica RN  Outcome: Progressing  5/22/2024 1115 by Patricia Mujica RN  Outcome: Not Progressing  Intervention: Monitor and Support Renal Function  Recent Flowsheet Documentation  Taken 5/22/2024 1000 by Patricia Mujica RN  Medication Review/Management: medications reviewed     Problem: Comorbidity Management  Goal: Blood Glucose Levels Within Targeted Range  5/22/2024 1651 by Patricia Mujica RN  Outcome: Met  5/22/2024 1445 by Patricia Mujica RN  Outcome: Progressing  5/22/2024 1115 by Patricia Mujica RN  Outcome: Not Progressing  Intervention: Monitor and Manage Glycemia  Recent Flowsheet Documentation  Taken 5/22/2024 1000 by Patricia Mujica RN  Medication Review/Management: medications reviewed  Goal: Blood Pressure in Desired Range  5/22/2024 1651 by [place]  "Patricia PILLAI RN  Outcome: Met  5/22/2024 1445 by Patricia Mujica RN  Outcome: Progressing  5/22/2024 1115 by Patricia Mujica RN  Outcome: Not Progressing  Intervention: Maintain Blood Pressure Management  Recent Flowsheet Documentation  Taken 5/22/2024 1000 by Patricia Mujica RN  Medication Review/Management: medications reviewed   Patient's After Visit Summary was reviewed with patient and/or family.   Patient verbalized understanding of After Visit Summary, recommended follow up and was given an opportunity to ask questions.   Discharge medications sent home with patient/family: YES, No   Discharged with son    Goal Outcome Evaluation:  BP (!) 153/39 (BP Location: Right arm)   Pulse 57   Temp 98  F (36.7  C) (Oral)   Resp 18   Ht 1.575 m (5' 2\")   Wt 79.5 kg (175 lb 4.8 oz)   SpO2 95%   BMI 32.06 kg/m       Plan of Care Reviewed With: patient          Outcome Evaluation: creatine 4.9      "

## 2024-05-22 NOTE — PLAN OF CARE
"Goal Outcome Evaluation:  PRIMARY DIAGNOSIS: MICHELLE  OUTPATIENT/OBSERVATION GOALS TO BE MET BEFORE DISCHARGE:  ADLs back to baseline: Yes     Activity and level of assistance: Ambulating independently.     Pain status: Pain free.     Return to near baseline physical activity: Yes             Discharge Planner Nurse  Safe discharge environment identified: Yes  Barriers to discharge: No       Entered by: Doreen Alvarenga RN 05/22/2024 4:09 AM        Please review provider order for any additional goals.   Nurse to notify provider when observation goals have been met and patient is ready for discharge.  Problem: Adult Inpatient Plan of Care  Goal: Plan of Care Review  Description: The Plan of Care Review/Shift note should be completed every shift.  The Outcome Evaluation is a brief statement about your assessment that the patient is improving, declining, or no change.  This information will be displayed automatically on your shift  note.  Outcome: Progressing  Flowsheets (Taken 5/22/2024 0408)  Outcome Evaluation: creatinine up, but stable per nephrology  Plan of Care Reviewed With: patient  Overall Patient Progress: improving  Goal: Patient-Specific Goal (Individualized)  Description: You can add care plan individualizations to a care plan. Examples of Individualization might be:  \"Parent requests to be called daily at 9am for status\", \"I have a hard time hearing out of my right ear\", or \"Do not touch me to wake me up as it startles  me\".  Outcome: Progressing  Goal: Absence of Hospital-Acquired Illness or Injury  Outcome: Progressing  Intervention: Identify and Manage Fall Risk  Recent Flowsheet Documentation  Taken 5/22/2024 0100 by Doreen Alvarenga RN  Safety Promotion/Fall Prevention:   clutter free environment maintained   lighting adjusted   nonskid shoes/slippers when out of bed   safety round/check completed  Intervention: Prevent Skin Injury  Recent Flowsheet Documentation  Taken 5/22/2024 0100 by Colette, " Doreen LAGOS RN  Skin Protection: adhesive use limited  Device Skin Pressure Protection:   absorbent pad utilized/changed   adhesive use limited  Goal: Optimal Comfort and Wellbeing  Outcome: Progressing  Goal: Readiness for Transition of Care  Outcome: Progressing     Problem: Acute Kidney Injury/Impairment  Goal: Fluid and Electrolyte Balance  Outcome: Progressing  Intervention: Monitor and Manage Fluid and Electrolyte Balance  Recent Flowsheet Documentation  Taken 5/22/2024 0100 by Doreen Alvarenga RN  Fluid/Electrolyte Management: fluids provided  Goal: Improved Oral Intake  Outcome: Progressing  Goal: Effective Renal Function  Outcome: Progressing  Intervention: Monitor and Support Renal Function  Recent Flowsheet Documentation  Taken 5/22/2024 0100 by Doreen Alvarenga, RN  Medication Review/Management: medications reviewed   Goal Outcome Evaluation:       Plan of Care Reviewed With: patient     Overall Patient Progress: improvingOverall Patient Progress: improving     Outcome Evaluation: creatinine up, but stable per nephrology

## 2024-05-22 NOTE — DISCHARGE SUMMARY
"[facility]  Hospitalist Discharge Summary      Date of Admission:  5/20/2024  Date of Discharge:  5/22/2024  Discharging Provider: Dalila Potts DO  Discharge Service: Hospitalist Service    Discharge Diagnoses   CKDV  Metabolic acidosis  Hyperkalemia  Anemia due to CKD + PRINCE  Hypocalcemia  Vit D Deficiency  DM2, uncontrolled  HTN    Clinically Significant Risk Factors     # Obesity: Estimated body mass index is 32.06 kg/m  as calculated from the following:    Height as of this encounter: 1.575 m (5' 2\").    Weight as of this encounter: 79.5 kg (175 lb 4.8 oz).       Follow-ups Needed After Discharge   Follow-up Appointments     Follow-up and recommended labs and tests       Follow up with primary care provider, Vivi Morfin, within 7 days   for hospital follow- up.  The following labs/tests are recommended: BMP.    Follow-up with nephrology 6/5.          2 g sodium and potassium restricted diet, food list given    Unresulted Labs Ordered in the Past 30 Days of this Admission       Date and Time Order Name Status Description    5/21/2024  2:50 PM Protein immunofixation urine In process         These results will be followed up by nephrology    Discharge Disposition   Discharged to home  Condition at discharge: Stable    Hospital Course   Nisreen Mckenna is a 63 yo F with a history of HTN, CKD, TARA, HLD, hypothyroidism who was admitted after being notified by her PCP that her renal function was abnormal and recommended ER evaluation. Cr in ED 3.96, was 4.16 at time of PCP visit 5/15; initially unclear baseline as she recently moved here from [place] but she was following with nephrology there and notes she had \"15% kidney function\", thus likely stable advanced kidney disease.    CKD V  Metabolic acidosis  Previously followed with nephrology in [place], admitted after abnormal labs done at PCP. She was hyperkalemic on admission to 6.3 with Cr 3.96 and BUN 68.2. No uremic symptoms. Renal disease " thought to be 2/2 HTN and DM.   -nephrology evaluated during admission, hospital follow-up visit planned 6/5 at 11 AM  -started sodium bicarb during admission  -outpatient follow up with nephrology to discuss and prep for likely future dialysis/transplant options   -follow up with PCP next week for BMP check, hospital discharge follow up     Hyperkalemia  K on admission 6.3; at outpatient PCP visit it was normal.   -low potassium diet education  -start lokelma 5 g daily    Anemia, likely multifactorial including anemia of chronic renal disease + PRINCE   Patient denies any blood loss.  Has had a colonoscopy within the past 10 years she reports was unremarkable  -IV Venofer 200 mg given    Bradycardia, first degree AVB    HTN  Continue amlodipine. Discontinue lisinopril    Hx Atrial Flutter s/p Ablation  S/p ablation while living in [place] ~10 years ago.  Was previously on blood thinners.  No current blood thinners.     Hx Pulmonary HTN  Heart Murmur  Systolic murmur noted on exam.  Pt reports this has been known.  No hx of valvular heart disease that she is aware of.  Has chronic peripheral edema at baseline.  Known pHTN.  Severe aortic calcification noted, mild AS    DM Type 2  Diagnosed ~30 years ago.  Was off insulin for 4 years after weight loss.  Not checking blood sugars recently until seen by PCP last week where Hgb A1C 10.2 (5/15/24).  Seen by Diabetes Educator last week and started on Glargine 16 units daily.  BS on admission 119.    - hold insulin given CKDV     Hyperlipidemia   Last lipid panel (5/15/2024) Tchol 351, HDL 62, , Trig 217.  Was on Atorvastatin years ago, recently ordered to start again by PCP last week  - continue Atorvastatin     Hypothyroidism  TSH last week wnl.  - continue Levothyroxine     TARA  - no longer uses cpap after weight loss  - follow up with PCP to consider sleep evaluation to reassess     Obesity, BMI 32       Consultations This Hospital Stay   NEPHROLOGY IP  CONSULT    Code Status   Full Code    Time Spent on this Encounter   I, Dalila Potts DO, personally saw the patient today and spent greater than 30 minutes discharging this patient.       Dalila Potts DO  [place] [address] [facility]  [address]  [facility] [address]  Phone: [phone]  Fax: [phone]  ______________________________________________________________________    Physical Exam   Vital Signs: Temp: 98.4  F (36.9  C) Temp src: Oral BP: (!) 157/50 Pulse: 58   Resp: 18 SpO2: 94 % O2 Device: None (Room air)    Weight: 175 lbs 4.8 oz         Primary Care Physician   Vivi Morfin    Discharge Orders      Reason for your hospital stay    You were hospitalized for abnormal labs, consistent with end stage renal disease.  Follow-up with nephrology 6/5.  In the meantime we have made some medication adjustments as noted in the discharge paperwork.  Discontinue the insulin at home for now as it can cause episodes of low blood sugar in CKD 5.  One of the things we discussed was the concern for high potassium, we will continue the Lokelma that we started inpatient once daily and as discussed recommend monitoring your diet for foods that contain a lot of potassium.     Follow-up and recommended labs and tests     Follow up with primary care provider, Vivi Morfin, within 7 days for hospital follow- up.  The following labs/tests are recommended: BMP.    Follow-up with nephrology 6/5.     Activity    Your activity upon discharge: activity as tolerated     Diet    Follow this diet upon discharge: Orders Placed This Encounter      Renal Diet (non-dialysis)       Significant Results and Procedures   Most Recent 3 CBC's:  Recent Labs   Lab Test 05/21/24  0746 05/20/24  1141   WBC  --  7.3   HGB 9.7* 10.7*   MCV  --  88   PLT  --  258     Most Recent 3 BMP's:  Recent Labs   Lab Test 05/22/24  1241 05/22/24  0843 05/22/24  0750 05/21/24  0908 05/21/24  0746 05/20/24  2123  05/20/24 2019   NA  --   --  139  --  135  --  131*   POTASSIUM  --   --  4.4  --  5.1  --  5.5*   CHLORIDE  --   --  107  --  105  --  100   CO2  --   --  18*  --  18*  --  20*   BUN  --   --  63.1*  --  65.0*  --  59.8*   CR  --   --  4.09*  --  4.05*  --  3.62*   ANIONGAP  --   --  14  --  12  --  11   BETH  --   --  8.2*  --  7.7*  --  7.7*   * 97 99   < > 93   < > 196*    < > = values in this interval not displayed.     Most Recent TSH and T4:No lab results found.  Most Recent Hemoglobin A1c:  Recent Labs   Lab Test 05/20/24  2239   A1C 9.7*     Most Recent Urinalysis:  Recent Labs   Lab Test 05/20/24  1240   COLOR Straw   APPEARANCE Clear   URINEGLC 30*   URINEBILI Negative   URINEKETONE Negative   SG 1.010   UBLD Trace*   URINEPH 5.5   PROTEIN 300*   NITRITE Negative   LEUKEST Negative   RBCU <1   WBCU 2     Most Recent Anemia Panel:  Recent Labs   Lab Test 05/21/24  0746 05/20/24  1309 05/20/24  1141   WBC  --   --  7.3   HGB 9.7*  --  10.7*   HCT  --   --  33.1*   MCV  --   --  88   PLT  --   --  258   IRON  --  38  --    IRONSAT  --  15  --    FEB  --  248  --    B12  --  519  --    FOLIC 8.9  --   --    ,   Results for orders placed or performed during the hospital encounter of 05/20/24   US Renal Complete w Arterial Duplex    Narrative    EXAM:  1. RENAL ULTRASOUND   2. RENAL DUPLEX  LOCATION: [facility]  DATE: 5/20/2024    INDICATION: CKD, HTN  COMPARISON: None.  TECHNIQUE: Duplex imaging is performed utilizing gray-scale, two-dimensional images, and color-flow imaging. Doppler waveform analysis and spectral Doppler imaging is also performed.    FINDINGS:     RIGHT KIDNEY: 11.5 x 4.9 x 5.6 cm. Normal without hydronephrosis or masses. Cystic lesion in the right superior lateral cystic lesion measuring 2.00 x 2.5 x 1.8 cm, no specific follow up needed. Echogenic renal parenchyma.     LEFT KIDNEY 11.8 x 5.3 x 5.5 cm. Normal without hydronephrosis or masses. Shadowing echogenic  area measuring 1.0 cm, likely a non obstructing stone. Echogenic renal parenchyma.     BLADDER: Normal.    RENAL DUPLEX:  Aortic PSV: 99 cm/s, multiphasic    Right Renal Artery PSV: Normal, less than 200 cm/s (Normal considered less than 200 cm/s.) Right renal vein is patent.     Right Intrarenal Resistive Index: Elevated measuring 0.9 - 1.0.    Left Renal Artery PSV elevated with peak systolic velocity 220 cm/s at the renal hilum (Normal considered less than 200 cm/s.) Left renal vein is patent.     Left Intrarenal Resistive Index: Elevated measuring 0.79 - 1.0       Impression    IMPRESSION:   1.  Elevated velocities at the left renal hilum, may represent tortuosity. Consider CTA or MRA for further evaluation.  2.  Elevated resistive indices bilaterally.  3.  Calcification in the left renal artery, may represent a non obstructing stone.   4.  Echogenic renal parenchyma bilaterally, suggesting medical renal disease.    Echocardiogram Complete     Value    LVEF  >70%    [facility]    [id number]  [id number]  [id number]  [id number]^ERIN^ROHITH^S     [facility]  Echocardiography Laboratory  [address] [place] [address]     Name: KHUSHI ROBERTSON  MRN: [id number]  : 1960  Study Date: 2024 02:59 PM  Age: 64 yrs  Gender: Female  Patient Location: [facility]  Reason For Study: Cardiac Murmur  Ordering Physician: ROHITH KHAN  Performed By: Daniel Kilgore RDCS     BSA: 1.8 m2  Height: 62 in  Weight: 179 lb  HR: 51  BP: 140/59 mmHg  ______________________________________________________________________________  Procedure  Complete Portable Echo Adult.  ______________________________________________________________________________  Interpretation Summary     Hyperdynamic left ventricular function  The visual ejection fraction is >70%.  No regional wall motion abnormalities noted.  Mild valvular aortic stenosis.  The mean AoV pressure gradient is 22mmHg.  Mild chordal systolic anterior motion  of mitral valve.  Dynamic LV mid ventricular and outflow obstruction noted with peak gradient of  70mm with Valsalva. The study was technically difficult. There is no  comparison study available.  ______________________________________________________________________________  Left Ventricle  The left ventricle is normal in size. There is borderline concentric left  ventricular hypertrophy. Hyperdynamic left ventricular function. The visual  ejection fraction is >70%. Grade II or moderate diastolic dysfunction. No  regional wall motion abnormalities noted.     Right Ventricle  The right ventricle is normal size. The right ventricular systolic function is  normal.     Atria  The left atrium is moderately dilated. Right atrial size is normal.     Mitral Valve  There is moderate mitral annular calcification. There is trace to mild mitral  regurgitation.     Tricuspid Valve  There is trace tricuspid regurgitation. The right ventricular systolic  pressure is approximated at 32.2 mmHg plus the right atrial pressure.     Aortic Valve  The aortic valve is not well visualized. It is severely calcified. The mean  AoV pressure gradient is 22mmHg. Mild valvular aortic stenosis.     Pulmonic Valve  The pulmonic valve is not well visualized. There is trace pulmonic valvular  regurgitation.     Vessels  The aortic root is normal size.     Pericardium  There is no pericardial effusion.     Rhythm  Sinus rhythm was noted. The patient exhibited frequent PACs.  ______________________________________________________________________________  MMode/2D Measurements & Calculations     IVSd: 1.1 cm  LVIDd: 4.6 cm  LVIDs: 2.1 cm  LVPWd: 1.2 cm  FS: 54.5 %  LV mass(C)d: 189.1 grams  LV mass(C)dI: 103.7 grams/m2  Ao root diam: 3.3 cm  LA dimension: 4.7 cm  LA/Ao: 1.4  LVOT diam: 2.1 cm  LVOT area: 3.6 cm2  Ao root diam index Ht(cm/m): 2.1  Ao root diam index BSA (cm/m2): 1.8  LA Volume (BP): 77.5 ml  LA Volume Index (BP): 42.6 ml/m2     RV  Base: 3.1 cm  RWT: 0.52  TAPSE: 2.3 cm     Doppler Measurements & Calculations  MV E max benedicto: 108.0 cm/sec  MV A max benedicto: 89.8 cm/sec  MV E/A: 1.2  MV dec slope: 426.1 cm/sec2  MV dec time: 0.25 sec  Ao V2 max: 306.7 cm/sec  Ao max P.0 mmHg  Ao V2 mean: 215.7 cm/sec  Ao mean P.6 mmHg  Ao V2 VTI: 63.2 cm  JESSIE(I,D): 2.2 cm2  JESSIE(V,D): 1.8 cm2  LV V1 max P.4 mmHg  LV V1 max: 153.0 cm/sec  LV V1 VTI: 38.9 cm  SV(LVOT): 138.7 ml  SI(LVOT): 76.1 ml/m2  TR max benedicto: 283.9 cm/sec  TR max P.2 mmHg  AV Benedicto Ratio (DI): 0.50  JESSIE Index (cm2/m2): 1.2  E/E' av.3  Lateral E/e': 15.5  Medial E/e': 17.1     RV S Benedicto: 9.0 cm/sec     ______________________________________________________________________________  Report approved by: José Miguel Dejesus 2024 03:57 PM             Discharge Medications   Current Discharge Medication List        START taking these medications    Details   calcitRIOL (ROCALTROL) 0.25 MCG capsule Take 1 capsule (0.25 mcg) by mouth daily  Qty: 30 capsule, Refills: 0    Associated Diagnoses: Acute renal failure, unspecified acute renal failure type (H24)      hydrALAZINE (APRESOLINE) 25 MG tablet Take 0.5 tablets (12.5 mg) by mouth 2 times daily  Qty: 60 tablet, Refills: 0    Associated Diagnoses: Acute renal failure, unspecified acute renal failure type (H24)      sodium bicarbonate 650 MG tablet Take 1 tablet (650 mg) by mouth 2 times daily  Qty: 60 tablet, Refills: 0    Associated Diagnoses: Acute renal failure, unspecified acute renal failure type (H24)      sodium zirconium cyclosilicate (LOKELMA) 5 g PACK packet Take 1 packet (5 g) by mouth daily  Qty: 30 each, Refills: 0    Associated Diagnoses: Acute renal failure, unspecified acute renal failure type (H24)           CONTINUE these medications which have NOT CHANGED    Details   amLODIPine (NORVASC) 10 MG tablet Take 10 mg by mouth daily      atorvastatin (LIPITOR) 40 MG tablet Take 40 mg by mouth daily      levothyroxine  (SYNTHROID/LEVOTHROID) 175 MCG tablet Take 175 mcg by mouth daily           STOP taking these medications       furosemide (LASIX) 80 MG tablet Comments:   Reason for Stopping:         LANTUS SOLOSTAR 100 UNIT/ML soln Comments:   Reason for Stopping:         lisinopril (ZESTRIL) 30 MG tablet Comments:   Reason for Stopping:             Allergies   No Known Allergies

## 2024-05-23 LAB
PATH REPORT.COMMENTS IMP SPEC: NORMAL
PROT ELPH PNL UR ELPH: NORMAL

## 2024-06-05 ENCOUNTER — TRANSFERRED RECORDS (OUTPATIENT)
Dept: HEALTH INFORMATION MANAGEMENT | Facility: CLINIC | Age: 64
End: 2024-06-05
Payer: COMMERCIAL

## 2024-06-11 ENCOUNTER — REFERRAL (OUTPATIENT)
Dept: TRANSPLANT | Facility: CLINIC | Age: 64
End: 2024-06-11
Payer: COMMERCIAL

## 2024-06-11 DIAGNOSIS — N18.6 ESRD (END STAGE RENAL DISEASE) (H): ICD-10-CM

## 2024-06-11 DIAGNOSIS — N18.9 ANEMIA IN CHRONIC KIDNEY DISEASE: ICD-10-CM

## 2024-06-11 DIAGNOSIS — N18.5 CHRONIC KIDNEY DISEASE, STAGE 5, KIDNEY FAILURE (H): Primary | ICD-10-CM

## 2024-06-11 DIAGNOSIS — D63.1 ANEMIA IN CHRONIC KIDNEY DISEASE: ICD-10-CM

## 2024-06-11 DIAGNOSIS — Z76.82 ORGAN TRANSPLANT CANDIDATE: ICD-10-CM

## 2024-06-11 DIAGNOSIS — G47.33 OSA (OBSTRUCTIVE SLEEP APNEA): ICD-10-CM

## 2024-06-11 NOTE — LETTER
6/25/24    Nisreen Mckenna  [address]  [place] [address]    Dear Nisreen,    Thank you for your interest in the Transplant Center at [facility]. We look forward to being a part of your care team and assisting you through the transplant process.    As we discussed, your transplant coordinator is Claudine Geren (Kidney).  You may call your coordinator at any time with questions or concerns.  Your first scheduled call will be on 7/2/24 at 2:00pm.  If this needs to change, call [phone].    Please complete the following.    Fill out and return the enclosed forms  Authorization for Electronic Communication  Authorization to Discuss Protected Health Information  Authorization for Release of Protected Health Information  Authorization for Care Everywhere Release of Information    Sign up for:  [employer], access to your electronic medical record (see enclosed pamphlet)  [employer], a transplant education website      You can use these tools to learn more about your transplant, communicate with your care team, and track your medical details      Sincerely,      Solid Organ Transplant  [facility]    cc: Care Team

## 2024-06-19 ENCOUNTER — APPOINTMENT (OUTPATIENT)
Dept: GENERAL RADIOLOGY | Facility: CLINIC | Age: 64
DRG: 673 | End: 2024-06-19
Attending: STUDENT IN AN ORGANIZED HEALTH CARE EDUCATION/TRAINING PROGRAM
Payer: COMMERCIAL

## 2024-06-19 ENCOUNTER — HOSPITAL ENCOUNTER (INPATIENT)
Facility: CLINIC | Age: 64
LOS: 4 days | Discharge: HOME | DRG: 673 | End: 2024-06-23
Attending: STUDENT IN AN ORGANIZED HEALTH CARE EDUCATION/TRAINING PROGRAM | Admitting: INTERNAL MEDICINE
Payer: COMMERCIAL

## 2024-06-19 DIAGNOSIS — N18.5 CKD (CHRONIC KIDNEY DISEASE) STAGE 5, GFR LESS THAN 15 ML/MIN (H): ICD-10-CM

## 2024-06-19 DIAGNOSIS — J96.01 ACUTE HYPOXEMIC RESPIRATORY FAILURE (H): Primary | ICD-10-CM

## 2024-06-19 DIAGNOSIS — E66.811 OBESITY, CLASS I, BMI 30-34.9: ICD-10-CM

## 2024-06-19 DIAGNOSIS — J90 BILATERAL PLEURAL EFFUSION: ICD-10-CM

## 2024-06-19 DIAGNOSIS — N18.6 ESRD (END STAGE RENAL DISEASE) ON DIALYSIS (H): ICD-10-CM

## 2024-06-19 DIAGNOSIS — Z99.2 ESRD (END STAGE RENAL DISEASE) ON DIALYSIS (H): ICD-10-CM

## 2024-06-19 DIAGNOSIS — R79.89 TROPONIN LEVEL ELEVATED: ICD-10-CM

## 2024-06-19 PROBLEM — Z86.69 HISTORY OF OBSTRUCTIVE SLEEP APNEA: Status: ACTIVE | Noted: 2024-05-15

## 2024-06-19 PROBLEM — E78.00 PURE HYPERCHOLESTEROLEMIA: Status: ACTIVE | Noted: 2024-05-16

## 2024-06-19 PROBLEM — D63.1 ANEMIA IN CHRONIC KIDNEY DISEASE: Status: ACTIVE | Noted: 2024-06-05

## 2024-06-19 PROBLEM — R01.1 HEART MURMUR: Status: ACTIVE | Noted: 2024-05-15

## 2024-06-19 PROBLEM — I10 ESSENTIAL HYPERTENSION: Status: ACTIVE | Noted: 2024-05-15

## 2024-06-19 PROBLEM — R53.83 FATIGUE: Status: ACTIVE | Noted: 2024-05-15

## 2024-06-19 PROBLEM — N18.9 ANEMIA IN CHRONIC KIDNEY DISEASE: Status: ACTIVE | Noted: 2024-06-05

## 2024-06-19 PROBLEM — E03.9 HYPOTHYROIDISM: Status: ACTIVE | Noted: 2024-05-15

## 2024-06-19 LAB
ANION GAP SERPL CALCULATED.3IONS-SCNC: 15 MMOL/L (ref 7–15)
ATRIAL RATE - MUSE: NORMAL BPM
BASE EXCESS BLDV CALC-SCNC: -1.8 MMOL/L (ref -3–3)
BASOPHILS # BLD AUTO: 0.1 10E3/UL (ref 0–0.2)
BASOPHILS NFR BLD AUTO: 1 %
BUN SERPL-MCNC: 69.8 MG/DL (ref 8–23)
CALCIUM SERPL-MCNC: 8.2 MG/DL (ref 8.8–10.2)
CHLORIDE SERPL-SCNC: 99 MMOL/L (ref 98–107)
CREAT SERPL-MCNC: 4.75 MG/DL (ref 0.51–0.95)
DEPRECATED HCO3 PLAS-SCNC: 21 MMOL/L (ref 22–29)
DIASTOLIC BLOOD PRESSURE - MUSE: NORMAL MMHG
EGFRCR SERPLBLD CKD-EPI 2021: 10 ML/MIN/1.73M2
EOSINOPHIL # BLD AUTO: 0.2 10E3/UL (ref 0–0.7)
EOSINOPHIL NFR BLD AUTO: 2 %
ERYTHROCYTE [DISTWIDTH] IN BLOOD BY AUTOMATED COUNT: 14 % (ref 10–15)
GLUCOSE BLDC GLUCOMTR-MCNC: 229 MG/DL (ref 70–99)
GLUCOSE BLDC GLUCOMTR-MCNC: 246 MG/DL (ref 70–99)
GLUCOSE SERPL-MCNC: 326 MG/DL (ref 70–99)
HCO3 BLDV-SCNC: 24 MMOL/L (ref 21–28)
HCT VFR BLD AUTO: 28.9 % (ref 35–47)
HGB BLD-MCNC: 9.5 G/DL (ref 11.7–15.7)
IMM GRANULOCYTES # BLD: 0 10E3/UL
IMM GRANULOCYTES NFR BLD: 0 %
INTERPRETATION ECG - MUSE: NORMAL
LYMPHOCYTES # BLD AUTO: 1 10E3/UL (ref 0.8–5.3)
LYMPHOCYTES NFR BLD AUTO: 13 %
MAGNESIUM SERPL-MCNC: 2.1 MG/DL (ref 1.7–2.3)
MCH RBC QN AUTO: 28 PG (ref 26.5–33)
MCHC RBC AUTO-ENTMCNC: 32.9 G/DL (ref 31.5–36.5)
MCV RBC AUTO: 85 FL (ref 78–100)
MONOCYTES # BLD AUTO: 0.7 10E3/UL (ref 0–1.3)
MONOCYTES NFR BLD AUTO: 9 %
NEUTROPHILS # BLD AUTO: 5.6 10E3/UL (ref 1.6–8.3)
NEUTROPHILS NFR BLD AUTO: 75 %
NRBC # BLD AUTO: 0 10E3/UL
NRBC BLD AUTO-RTO: 0 /100
NT-PROBNP SERPL-MCNC: 6777 PG/ML (ref 0–900)
O2/TOTAL GAS SETTING VFR VENT: 100 %
OXYHGB MFR BLDV: 70 % (ref 70–75)
P AXIS - MUSE: NORMAL DEGREES
PCO2 BLDV: 42 MM HG (ref 40–50)
PH BLDV: 7.36 [PH] (ref 7.32–7.43)
PHOSPHATE SERPL-MCNC: 5.1 MG/DL (ref 2.5–4.5)
PLATELET # BLD AUTO: 261 10E3/UL (ref 150–450)
PO2 BLDV: 40 MM HG (ref 25–47)
POTASSIUM SERPL-SCNC: 3.8 MMOL/L (ref 3.4–5.3)
PR INTERVAL - MUSE: NORMAL MS
QRS DURATION - MUSE: 112 MS
QT - MUSE: 472 MS
QTC - MUSE: 467 MS
R AXIS - MUSE: 20 DEGREES
RBC # BLD AUTO: 3.39 10E6/UL (ref 3.8–5.2)
SAO2 % BLDV: 70.7 % (ref 70–75)
SODIUM SERPL-SCNC: 135 MMOL/L (ref 135–145)
SYSTOLIC BLOOD PRESSURE - MUSE: NORMAL MMHG
T AXIS - MUSE: 50 DEGREES
TROPONIN T SERPL HS-MCNC: 109 NG/L
TROPONIN T SERPL HS-MCNC: 110 NG/L
TSH SERPL DL<=0.005 MIU/L-ACNC: 1.36 UIU/ML (ref 0.3–4.2)
VENTRICULAR RATE- MUSE: 59 BPM
WBC # BLD AUTO: 7.5 10E3/UL (ref 4–11)

## 2024-06-19 PROCEDURE — 84443 ASSAY THYROID STIM HORMONE: CPT | Performed by: INTERNAL MEDICINE

## 2024-06-19 PROCEDURE — 36415 COLL VENOUS BLD VENIPUNCTURE: CPT | Performed by: STUDENT IN AN ORGANIZED HEALTH CARE EDUCATION/TRAINING PROGRAM

## 2024-06-19 PROCEDURE — 99285 EMERGENCY DEPT VISIT HI MDM: CPT | Mod: 25

## 2024-06-19 PROCEDURE — 250N000011 HC RX IP 250 OP 636: Mod: JZ | Performed by: STUDENT IN AN ORGANIZED HEALTH CARE EDUCATION/TRAINING PROGRAM

## 2024-06-19 PROCEDURE — 99223 1ST HOSP IP/OBS HIGH 75: CPT | Performed by: INTERNAL MEDICINE

## 2024-06-19 PROCEDURE — 93005 ELECTROCARDIOGRAM TRACING: CPT

## 2024-06-19 PROCEDURE — 84484 ASSAY OF TROPONIN QUANT: CPT | Performed by: STUDENT IN AN ORGANIZED HEALTH CARE EDUCATION/TRAINING PROGRAM

## 2024-06-19 PROCEDURE — 250N000013 HC RX MED GY IP 250 OP 250 PS 637: Performed by: INTERNAL MEDICINE

## 2024-06-19 PROCEDURE — 96374 THER/PROPH/DIAG INJ IV PUSH: CPT | Mod: 59

## 2024-06-19 PROCEDURE — 85025 COMPLETE CBC W/AUTO DIFF WBC: CPT | Performed by: STUDENT IN AN ORGANIZED HEALTH CARE EDUCATION/TRAINING PROGRAM

## 2024-06-19 PROCEDURE — 83880 ASSAY OF NATRIURETIC PEPTIDE: CPT | Performed by: STUDENT IN AN ORGANIZED HEALTH CARE EDUCATION/TRAINING PROGRAM

## 2024-06-19 PROCEDURE — 120N000001 HC R&B MED SURG/OB

## 2024-06-19 PROCEDURE — 82805 BLOOD GASES W/O2 SATURATION: CPT | Performed by: STUDENT IN AN ORGANIZED HEALTH CARE EDUCATION/TRAINING PROGRAM

## 2024-06-19 PROCEDURE — 71046 X-RAY EXAM CHEST 2 VIEWS: CPT

## 2024-06-19 PROCEDURE — 250N000012 HC RX MED GY IP 250 OP 636 PS 637: Performed by: INTERNAL MEDICINE

## 2024-06-19 PROCEDURE — 250N000011 HC RX IP 250 OP 636: Mod: JZ | Performed by: INTERNAL MEDICINE

## 2024-06-19 PROCEDURE — 83735 ASSAY OF MAGNESIUM: CPT | Performed by: INTERNAL MEDICINE

## 2024-06-19 PROCEDURE — 84100 ASSAY OF PHOSPHORUS: CPT | Performed by: INTERNAL MEDICINE

## 2024-06-19 PROCEDURE — 80048 BASIC METABOLIC PNL TOTAL CA: CPT | Performed by: STUDENT IN AN ORGANIZED HEALTH CARE EDUCATION/TRAINING PROGRAM

## 2024-06-19 PROCEDURE — 93308 TTE F-UP OR LMTD: CPT

## 2024-06-19 RX ORDER — BUMETANIDE 0.25 MG/ML
2 INJECTION INTRAMUSCULAR; INTRAVENOUS EVERY 8 HOURS
Status: DISCONTINUED | OUTPATIENT
Start: 2024-06-19 | End: 2024-06-21

## 2024-06-19 RX ORDER — BUMETANIDE 2 MG/1
2 TABLET ORAL 2 TIMES DAILY
COMMUNITY

## 2024-06-19 RX ORDER — PNV NO.95/FERROUS FUM/FOLIC AC 28MG-0.8MG
1 TABLET ORAL DAILY
COMMUNITY

## 2024-06-19 RX ORDER — AMOXICILLIN 250 MG
1 CAPSULE ORAL 2 TIMES DAILY PRN
Status: DISCONTINUED | OUTPATIENT
Start: 2024-06-19 | End: 2024-06-23 | Stop reason: HOSPADM

## 2024-06-19 RX ORDER — LIDOCAINE 40 MG/G
CREAM TOPICAL
Status: DISCONTINUED | OUTPATIENT
Start: 2024-06-19 | End: 2024-06-23 | Stop reason: HOSPADM

## 2024-06-19 RX ORDER — ONDANSETRON 4 MG/1
4 TABLET, ORALLY DISINTEGRATING ORAL EVERY 6 HOURS PRN
Status: DISCONTINUED | OUTPATIENT
Start: 2024-06-19 | End: 2024-06-23 | Stop reason: HOSPADM

## 2024-06-19 RX ORDER — NICOTINE POLACRILEX 4 MG
15-30 LOZENGE BUCCAL
Status: DISCONTINUED | OUTPATIENT
Start: 2024-06-19 | End: 2024-06-23 | Stop reason: HOSPADM

## 2024-06-19 RX ORDER — ACETAMINOPHEN 325 MG/1
650 TABLET ORAL EVERY 4 HOURS PRN
Status: DISCONTINUED | OUTPATIENT
Start: 2024-06-19 | End: 2024-06-23 | Stop reason: HOSPADM

## 2024-06-19 RX ORDER — FUROSEMIDE 10 MG/ML
80 INJECTION INTRAMUSCULAR; INTRAVENOUS ONCE
Status: COMPLETED | OUTPATIENT
Start: 2024-06-19 | End: 2024-06-19

## 2024-06-19 RX ORDER — CALCITRIOL 0.25 UG/1
0.25 CAPSULE, LIQUID FILLED ORAL DAILY
Status: DISCONTINUED | OUTPATIENT
Start: 2024-06-19 | End: 2024-06-23 | Stop reason: HOSPADM

## 2024-06-19 RX ORDER — ACETAMINOPHEN 650 MG/1
650 SUPPOSITORY RECTAL EVERY 4 HOURS PRN
Status: DISCONTINUED | OUTPATIENT
Start: 2024-06-19 | End: 2024-06-23 | Stop reason: HOSPADM

## 2024-06-19 RX ORDER — HYDRALAZINE HYDROCHLORIDE 25 MG/1
25 TABLET, FILM COATED ORAL 3 TIMES DAILY
COMMUNITY

## 2024-06-19 RX ORDER — SODIUM BICARBONATE 650 MG/1
650 TABLET ORAL 3 TIMES DAILY
Status: DISCONTINUED | OUTPATIENT
Start: 2024-06-19 | End: 2024-06-21

## 2024-06-19 RX ORDER — HYDRALAZINE HYDROCHLORIDE 25 MG/1
25 TABLET, FILM COATED ORAL 3 TIMES DAILY
Status: DISCONTINUED | OUTPATIENT
Start: 2024-06-19 | End: 2024-06-23 | Stop reason: HOSPADM

## 2024-06-19 RX ORDER — CHOLECALCIFEROL (VITAMIN D3) 50 MCG
1 TABLET ORAL DAILY
COMMUNITY

## 2024-06-19 RX ORDER — DEXTROSE MONOHYDRATE 25 G/50ML
25-50 INJECTION, SOLUTION INTRAVENOUS
Status: DISCONTINUED | OUTPATIENT
Start: 2024-06-19 | End: 2024-06-23 | Stop reason: HOSPADM

## 2024-06-19 RX ORDER — AMOXICILLIN 250 MG
2 CAPSULE ORAL 2 TIMES DAILY PRN
Status: DISCONTINUED | OUTPATIENT
Start: 2024-06-19 | End: 2024-06-23 | Stop reason: HOSPADM

## 2024-06-19 RX ORDER — ONDANSETRON 2 MG/ML
4 INJECTION INTRAMUSCULAR; INTRAVENOUS EVERY 6 HOURS PRN
Status: DISCONTINUED | OUTPATIENT
Start: 2024-06-19 | End: 2024-06-23 | Stop reason: HOSPADM

## 2024-06-19 RX ORDER — CALCIUM CARBONATE 500 MG/1
1000 TABLET, CHEWABLE ORAL 4 TIMES DAILY PRN
Status: DISCONTINUED | OUTPATIENT
Start: 2024-06-19 | End: 2024-06-23 | Stop reason: HOSPADM

## 2024-06-19 RX ADMIN — FUROSEMIDE 80 MG: 10 INJECTION, SOLUTION INTRAMUSCULAR; INTRAVENOUS at 15:50

## 2024-06-19 RX ADMIN — INSULIN ASPART 2 UNITS: 100 INJECTION, SOLUTION INTRAVENOUS; SUBCUTANEOUS at 19:04

## 2024-06-19 RX ADMIN — SODIUM BICARBONATE 650 MG TABLET 650 MG: at 21:44

## 2024-06-19 RX ADMIN — BUMETANIDE 2 MG: 0.25 INJECTION INTRAMUSCULAR; INTRAVENOUS at 18:36

## 2024-06-19 RX ADMIN — CALCITRIOL CAPSULES 0.25 MCG 0.25 MCG: 0.25 CAPSULE ORAL at 21:44

## 2024-06-19 RX ADMIN — HYDRALAZINE HYDROCHLORIDE 25 MG: 25 TABLET, FILM COATED ORAL at 21:45

## 2024-06-19 ASSESSMENT — ACTIVITIES OF DAILY LIVING (ADL)
ADLS_ACUITY_SCORE: 35
ADLS_ACUITY_SCORE: 35
ADLS_ACUITY_SCORE: 20
ADLS_ACUITY_SCORE: 35
ADLS_ACUITY_SCORE: 20
ADLS_ACUITY_SCORE: 23
ADLS_ACUITY_SCORE: 35

## 2024-06-19 NOTE — ED NOTES
[facility]  ED Nurse Handoff Report    ED Chief complaint: Shortness of Breath  . ED Diagnosis:   Final diagnoses:   Acute hypoxemic respiratory failure (H)       Allergies: No Known Allergies    Code Status: Full Code    Activity level - Baseline/Home:  assist of 1.  Activity Level - Current:   assist of 1.   Lift room needed: No.   Bariatric: No   Needed: No   Isolation: No.   Infection: Not Applicable.     Respiratory status: Oximask    Vital Signs (within 30 minutes):   Vitals:    06/19/24 1440 06/19/24 1500 06/19/24 1530 06/19/24 1540   BP: (!) 178/70 (!) 170/72 (!) 169/71    Pulse:  57 58 56   Resp:  17 19 20   Temp:       SpO2: 93% (!) 88%  91%   Weight:           Cardiac Rhythm:  ,      Pain level:    Patient confused: No.   Patient Falls Risk: activity supervised, mobility aid in reach, and room door open.   Elimination Status: Has voided     Patient Report - Initial Complaint: shortness of breath .   Focused Assessment:  Nisreen Mckenna is a very pleasant 64 year old female with stage 5 kidney disease, not on dialysis presenting with shortness of breath. Patient is 85% O2 on RA. The patient reports that she has had shortness of breath and chest pain that started a few days ago and has been getting worse. She notes that the shortness of breath and chest pain are exacerbated when walking around. Furthermore, she states she has gained 10 pounds in the last week. She endorses leg swelling but denies pain and redness. The patient notes she has not been producing much urine. She denies any fever, chills, or palpitations.       Abnormal Results:   Labs Ordered and Resulted from Time of ED Arrival to Time of ED Departure   BASIC METABOLIC PANEL - Abnormal       Result Value    Sodium 135      Potassium 3.8      Chloride 99      Carbon Dioxide (CO2) 21 (*)     Anion Gap 15      Urea Nitrogen 69.8 (*)     Creatinine 4.75 (*)     GFR Estimate 10 (*)     Calcium 8.2 (*)     Glucose 326  (*)    NT PROBNP INPATIENT - Abnormal    N terminal Pro BNP Inpatient 6,777 (*)    CBC WITH PLATELETS AND DIFFERENTIAL - Abnormal    WBC Count 7.5      RBC Count 3.39 (*)     Hemoglobin 9.5 (*)     Hematocrit 28.9 (*)     MCV 85      MCH 28.0      MCHC 32.9      RDW 14.0      Platelet Count 261      % Neutrophils 75      % Lymphocytes 13      % Monocytes 9      % Eosinophils 2      % Basophils 1      % Immature Granulocytes 0      NRBCs per 100 WBC 0      Absolute Neutrophils 5.6      Absolute Lymphocytes 1.0      Absolute Monocytes 0.7      Absolute Eosinophils 0.2      Absolute Basophils 0.1      Absolute Immature Granulocytes 0.0      Absolute NRBCs 0.0     BLOOD GAS VENOUS    pH Venous 7.36      pCO2 Venous 42      pO2 Venous 40      Bicarbonate Venous 24      Base Excess/Deficit Venous -1.8      FIO2 100      Oxyhemoglobin Venous 70      O2 Sat, Venous 70.7     TROPONIN T, HIGH SENSITIVITY        POC US ECHO LIMITED    (Results Pending)   Chest XR,  PA & LAT    (Results Pending)       Treatments provided: see above see mar   Family Comments: none  OBS brochure/video discussed/provided to patient:  Yes  ED Medications:   Medications   furosemide (LASIX) injection 80 mg (has no administration in time range)       Drips infusing:  Yes  For the majority of the shift this patient was Green.   Interventions performed were none .    Sepsis treatment initiated: No    Cares/treatment/interventions/medications to be completed following ED care: see above     ED Nurse Name: Odilia Berrios RN  3:40 PM    RECEIVING UNIT ED HANDOFF REVIEW    Above ED Nurse Handoff Report was reviewed: Yes  Reviewed by: Emilia Junior RN on June 19, 2024 at 5:42 PM   STEPHANIE Leonard called the ED to inform them the note was read: Yes

## 2024-06-19 NOTE — PHARMACY-ADMISSION MEDICATION HISTORY
Pharmacist Admission Medication History    Admission medication history is complete. The information provided in this note is only as accurate as the sources available at the time of the update.    Information Source(s): Patient via in-person + paper med list.    Pertinent Information: patient had a paper med list with her. No longer on lasix, lantus, lisinopril. Amlodipine was stopped yesterday per patient - no longer on her paper med list. Hydralazine and sodium bicarbonate doses changed, both are dosed TID now. Discharged from [facility] 5.22.24.    Changes made to PTA medication list:  Added: vit d, iron, bumetanide  Deleted: none  Changed: hydralazine, sodium bicarb    Allergies reviewed with patient and updates made in EHR: yes (nka)    Medication History Completed By: Mara Canseco [facility] 6/19/2024 2:45 PM    PTA Med List   Medication Sig Last Dose    atorvastatin (LIPITOR) 40 MG tablet Take 40 mg by mouth daily 6/19/2024 at am    bumetanide (BUMEX) 2 MG tablet Take 2 mg by mouth 2 times daily 6/19/2024 at am    calcitRIOL (ROCALTROL) 0.25 MCG capsule Take 1 capsule (0.25 mcg) by mouth daily 6/19/2024 at am    Ferrous Sulfate (IRON) 325 (65 Fe) MG tablet Take 1 tablet by mouth daily 6/19/2024 at am    hydrALAZINE (APRESOLINE) 25 MG tablet Take 25 mg by mouth 3 times daily 6/19/2024 at am x1    levothyroxine (SYNTHROID/LEVOTHROID) 175 MCG tablet Take 175 mcg by mouth daily 6/19/2024 at am    sodium bicarbonate 650 MG tablet Take 1 tablet (650 mg) by mouth 2 times daily (Patient taking differently: Take 650 mg by mouth 3 times daily) 6/19/2024 at am x1    sodium zirconium cyclosilicate (LOKELMA) 5 g PACK packet Take 1 packet (5 g) by mouth daily 6/19/2024 at am    vitamin D3 (CHOLECALCIFEROL) 50 mcg (2000 units) tablet Take 1 tablet by mouth daily 6/19/2024 at am

## 2024-06-19 NOTE — H&P
Hospitalist Admission   History and Physical    CC:  SOB, weight gain, leg swelling    HPI:  65 yo F with a history of HTN, CKD, TARA, HLD, hypothyroidism who presents hospital today for shortness of breath, weight gain, and leg swelling    ED: Presenting vital signs included systolic blood pressure near 180, heart rate near 60, no fever, and reported oxygen saturations of 84% on RA.  Patient currently on 7 L nasal cannula with saturations in the low 90% range    Pertinent labs notable for normal VBG, hemoglobin 9.5, creatinine near 4.8, BUN near 70, glucose near 330, BNP of 6700, and troponin of 110    Bedside echocardiogram performed by the ER provider appears to show bilateral pleural effusions and normal ejection fraction per his report.  Await formal radiology read    Chest x-ray ordered and pending    Patient is being admitted to the hospital service for acute hypoxic respiratory failure suspected to be due to volume overload in the setting of chronic kidney disease    PMH:  No past medical history on file.     Medications:  No current facility-administered medications for this encounter.     Current Outpatient Medications   Medication Sig Dispense Refill    atorvastatin (LIPITOR) 40 MG tablet Take 40 mg by mouth daily      bumetanide (BUMEX) 2 MG tablet Take 2 mg by mouth 2 times daily      calcitRIOL (ROCALTROL) 0.25 MCG capsule Take 1 capsule (0.25 mcg) by mouth daily 30 capsule 0    Ferrous Sulfate (IRON) 325 (65 Fe) MG tablet Take 1 tablet by mouth daily      hydrALAZINE (APRESOLINE) 25 MG tablet Take 25 mg by mouth 3 times daily      levothyroxine (SYNTHROID/LEVOTHROID) 175 MCG tablet Take 175 mcg by mouth daily      sodium bicarbonate 650 MG tablet Take 1 tablet (650 mg) by mouth 2 times daily (Patient taking differently: Take 650 mg by mouth 3 times daily) 60 tablet 0    sodium zirconium cyclosilicate (LOKELMA) 5 g PACK packet Take 1 packet (5 g) by mouth daily 30 each 0    vitamin D3 (CHOLECALCIFEROL)  50 mcg (2000 units) tablet Take 1 tablet by mouth daily      amLODIPine (NORVASC) 10 MG tablet Take 10 mg by mouth daily (Patient not taking: Reported on 6/19/2024)          Allergies:   No Known Allergies     Family History:  noncontributory    Social History:      Review of Systems: Comprehensive greater than 10 point review systems otherwise negative besides that detailed above    Physical exam:  BP (!) 169/71   Pulse 56   Temp 97.9  F (36.6  C)   Resp 20   Wt 86.2 kg (190 lb)   SpO2 91%   BMI 34.75 kg/m     PSYCH: pleasant, oriented, No acute distress.  HEART:  Normal S1, S2 with no edema.  LUNGS:  Clear to auscultation, normal Respiratory effort.  ABDOMEN:  Soft, no hepatosplenomegaly, normal bowel sounds.  SKIN:  Dry to touch, No rash.     Pertinent laboratory and imaging data reviewed above in HPI         Impression:  65 yo F with a history of HTN, CKD, TARA, HLD, hypothyroidism who presents hospital today for shortness of breath, weight gain, and leg swelling    Patient was most recently hospitalized 5/20/2024 through 5/22/2024 for worsening renal function and abnormal labs including hyperkalemia and acute on chronic renal failure.  The patient was seen by nephrology that admission, a follow-up clinic visit with nephrology was arranged on discharge.    ED: Presenting vital signs included systolic blood pressure near 180, heart rate near 60, no fever, and reported oxygen saturations of 84% on RA.  Patient currently on 7 L nasal cannula with saturations in the low 90% range    Pertinent labs notable for normal VBG, hemoglobin 9.5, creatinine near 4.8, BUN near 70, glucose near 330, BNP of 6700, and troponin of 110    Bedside echocardiogram performed by the ER provider appears to show bilateral pleural effusions and normal ejection fraction per his report.  Await formal radiology read    Chest x-ray ordered and pending    Patient is being admitted to the hospital service for acute hypoxic respiratory  failure suspected to be due to volume overload in the setting of chronic kidney disease    1.  Acute hypoxic respiratory failure  -Suspect due to volume overload from progressive chronic kidney disease  -Recent TTE performed 5/20/2024 showed ejection fraction of 70% with borderline LVH and mild aortic stenosis  -The patient was just transitioned from Lasix 80 mg p.o. twice daily to Bumex 2 mg p.o. twice daily yesterday by her nephrologist  -Received 80 mg IV Lasix in the ER.    -Will order Bumex 2 mg IV every 8 hours for now  -Await chest x-ray to rule out alternative diagnosis.  If significant pleural effusion is present may benefit from thoracentesis  -Will consult nephrology.  Anticipate she may require initiation of dialysis for volume management this admission.      2.  Acute on chronic kidney disease.    - Baseline creatinine near 4 historically  -Has not required dialysis in the past.  She has discussed the possibility of peritoneal dialysis with her nephrologist  -Follows with Dr. Link of nephrology  - check mag and phos  - resume bicarbonate tabs TID  - resume daily Lokelma dose    3.  Diabetes mellitus  - recent hgb A1c 9.7 in 5/24  -Treat with insulin sliding scale  -Resume appropriate home diabetic medications when clarified by pharmacy    4.  Obstructive sleep apnea    5.  Hypertension    6 hypothyroidism  -Check TSH for completeness        CODE STATUS: Full code.  I confirmed CODE STATUS with the patient on admission, and this is also consistent with her CODE STATUS during her recent admission to the hospital

## 2024-06-19 NOTE — ED TRIAGE NOTES
Patient arrives with complaints of SOB. Recently hospitalized with kidney failure. Hypoxic in triage.

## 2024-06-19 NOTE — LETTER
Dialysis Intake Checklist      Your Name: Germaine Cuello RN Contact Phone Number: [phone]     Fax Number and Email: [phone] Hospital/Practice: [facility]     Patient Name: Nisreen Mckenna   Referring Nephrologist: Dr. Mejias     Requested Facility(s) or Zip Code: [place]     Patient Started Date of Dialysis: anticipate 6/21/24      Estimated Outpatient Start Date of Dialysis: Monday 6/24/24   Treatment Duration & Frequency: TBD     Preferred Schedule: Monday, Wednesday , and Friday PM     Is the patient employed? No   Is there a working shift we can accommodate?  No   Is patient flexible? Yes        Modality:   In-Center Hemo   Diagnosis:   End Stage Renal Disease (ESRD - Stage 5 Chronic Kidney Disease)     Access Type(s):   CVC    If only a CVC, is there an active AV Access Plan (e.g., Vessel Mapping, Surgeon Consult, etc.)?:   No         Where will this patient be discharged to? Home     Is this patient trach or vent dependent? No     Does this patient have an L-VAD or Life Vest? No     Does this patient have outpatient dialysis history? No     Does this patient receive continuous medication via infusion pumps? No     Daily Care - Activity Management/Activity assistance needed?   Activity Management: ambulated in room, ambulated to bathroom, up in chair   Activity Assistance Provided: assistance, 1 person   Assistive Device Utilized: gait belt      Can this patient sit in a standard chair to dialyze? Yes:      Require treatment in a bed?  No     Is the patient HEP B positive? No     Can this patient sign their own legal consents? Yes:      Other special needs?       Allergies: No Known Allergies     Medication List:   Current Facility-Administered Medications   Medication Dose Route Frequency Provider Last Rate Last Admin    acetaminophen (TYLENOL) tablet 650 mg  650 mg Oral Q4H PRN Michael Glynn MD        Or    acetaminophen (TYLENOL) Suppository 650 mg  650 mg  Rectal Q4H PRN Michael Glynn MD        bumetanide (BUMEX) injection 2 mg  2 mg Intravenous Q8H Michael Glynn MD   2 mg at 06/20/24 0902    calcitRIOL (ROCALTROL) capsule 0.25 mcg  0.25 mcg Oral Daily Michael Glynn MD   0.25 mcg at 06/20/24 0902    calcium carbonate (TUMS) chewable tablet 1,000 mg  1,000 mg Oral 4x Daily PRN Michael Glynn MD        ceFAZolin (ANCEF) 2 g in 100 mL D5W intermittent infusion  2 g Intravenous Pre-Op/Pre-procedure x 1 dose [facility] Tam[facility] CNP        glucose gel 15-30 g  15-30 g Oral Q15 Min PRN Michael Glynn MD        Or    dextrose 50 % injection 25-50 mL  25-50 mL Intravenous Q15 Min PRN Michael Glynn MD        Or    glucagon injection 1 mg  1 mg Subcutaneous Q15 Min PRN Michael Glynn MD        hydrALAZINE (APRESOLINE) tablet 25 mg  25 mg Oral TID Michael Glynn MD   25 mg at 06/20/24 0902    insulin aspart (NovoLOG) injection (RAPID ACTING)  1-7 Units Subcutaneous TID AC Michael Glynn MD   1 Units at 06/20/24 1232    insulin aspart (NovoLOG) injection (RAPID ACTING)  1-5 Units Subcutaneous At Bedtime Michael Glynn MD   1 Units at 06/19/24 2147    levothyroxine (SYNTHROID/LEVOTHROID) tablet 175 mcg  175 mcg Oral Daily Michael Glynn MD   175 mcg at 06/20/24 0902    lidocaine (LMX4) cream   Topical Q1H PRN Michael Glynn MD        lidocaine 1 % 0.1-1 mL  0.1-1 mL Other Q1H PRN Michael Glynn MD        medication instruction   Does not apply Continuous PRN PaigePremier HealthRenetta APRN CNP        ondansetron (ZOFRAN ODT) ODT tab 4 mg  4 mg Oral Q6H PRN Michael Glynn MD        Or    ondansetron (ZOFRAN) injection 4 mg  4 mg Intravenous Q6H PRN Michael Glynn MD        senna-docusate (SENOKOT-S/PERICOLACE) 8.6-50 MG per tablet 1 tablet  1 tablet Oral BID PRN Michael Glynn MD        Or    senna-docusate (SENOKOT-S/PERICOLACE) 8.6-50 MG per tablet 2  "tablet  2 tablet Oral BID PRN Michael Glynn MD        sodium bicarbonate tablet 650 mg  650 mg Oral TID Michael Glynn MD   650 mg at 06/20/24 0902    sodium chloride (PF) 0.9% PF flush 3 mL  3 mL Intracatheter Q8H Michael Glynn MD   3 mL at 06/20/24 0902    sodium chloride (PF) 0.9% PF flush 3 mL  3 mL Intracatheter q1 min prn Michael Glynn MD   3 mL at 06/20/24 1225    [START ON 6/21/2024] sodium chloride 0.9 % bag TABLE SOLN  1 Bag TABLE SOLN Q5 Min PRN Renetta Bonilla APRN CNP              HEP Panel:  Hep B Antigen (HBsAG):   Lab Results   Component Value Date    HEPBANG Nonreactive 06/20/2024     Hep B Surface Antibody (HBsAb):   Lab Results   Component Value Date    AUSAB <3.50 05/21/2024     Hep B Total Core Antibody: No results found for: \"HBCAB\"     Chest X-Ray:   Results for orders placed during the hospital encounter of 06/19/24    XR CHEST 2 VIEWS    Narrative  EXAM: XR CHEST 2 VIEWS  LOCATION: [facility]  DATE: 6/19/2024    INDICATION: sob  COMPARISON: None.    Impression  IMPRESSION: Small bilateral pleural effusions. Engorged indistinct central pulmonary vasculature, correlate for interstitial pulmonary edema. Mildly enlarged heart. Atherosclerotic aorta. No acute osseous abnormality.       PPD:  M TUBERCULOSIS RESULT: No results found for: \"TBRSLT\"  M TUBERCULOSIS ANTIGEN VALUE: No results found for: \"TBAGN\"             "

## 2024-06-19 NOTE — ED PROVIDER NOTES
History   Chief Complaint:  Shortness of Breath       HPI:  Nisreen Mckenna is a very pleasant 64 year old female with history of stage 5 chronic kidney disease, not on dialysis, obesity, hypercholesterolemia, hypertension, type 2 diabetes presenting with shortness of breath. Patient is 85% O2 on RA. The patient reports that she has had shortness of breath and chest pain that started a few days ago and has been getting worse. She notes that the shortness of breath and chest pain are exacerbated when walking around. Furthermore, she states she has gained 10 pounds in the last week. She endorses leg swelling but denies pain and redness. The patient notes she has not been producing much urine. She denies any fever, chills, or palpitations.  Of note, the patient was evaluated by nephrology yesterday and a plan was made for placement of a peritoneal dialysis catheter.  Nephrology noted the patient's hypervolemia with plans for reevaluation by telephone later this week. Patient was also started on 2 mg bumetanide twice daily.    Independent Historian:  None. Only the patient provided history.    Review of External Notes:  I personally reviewed notes from the patient's discharge summary dated  5/22/2024 . This provided me with information regarding patient's baseline medical problems, patient's recent clinical course, and patient's recent hospitalization.  I personally reviewed notes from the patient's clinic visit dated yesterday. This provided me with information regarding  patient's current management with nephrology .     I personally reviewed the patient's chart, including available medication list and available past medical history, past surgical history, family history, and social history.    Physical Exam   Patient Vitals for the past 24 hrs:   BP Temp Pulse Resp SpO2 Weight   06/19/24 1540 -- -- 56 20 91 % --   06/19/24 1530 (!) 169/71 -- 58 19 -- --   06/19/24 1500 (!) 170/72 -- 57 17 (!) 88 % --   06/19/24 1440  (!) 178/70 -- -- -- 93 % --   06/19/24 1430 -- -- -- -- 95 % --   06/19/24 1408 (!) 170/74 -- -- -- -- --   06/19/24 1406 -- 97.9  F (36.6  C) 63 18 (!) 84 % 86.2 kg (190 lb)      Physical Exam  Vitals and nursing note reviewed.   Constitutional:       Appearance: She is obese. She is ill-appearing. She is not diaphoretic.   Cardiovascular:      Rate and Rhythm: Normal rate. Rhythm irregular.      Heart sounds: Normal heart sounds.   Pulmonary:      Effort: Pulmonary effort is normal. No tachypnea, accessory muscle usage or respiratory distress.      Breath sounds: Examination of the right-lower field reveals decreased breath sounds. Examination of the left-lower field reveals decreased breath sounds. Decreased breath sounds present.   Abdominal:      Tenderness: There is no abdominal tenderness.   Musculoskeletal:      Right lower leg: Edema present.      Left lower leg: Edema present.   Skin:     General: Skin is warm and dry.   Neurological:      General: No focal deficit present.      Mental Status: She is alert.   Psychiatric:         Mood and Affect: Mood normal.         Behavior: Behavior normal.          Emergency Department Course     ECG:   ECG results from 06/19/24   EKG 12-lead, tracing only     Value    Systolic Blood Pressure     Diastolic Blood Pressure     Ventricular Rate 59    Atrial Rate     WV Interval     QRS Duration 112        QTc 467    P Axis     R AXIS 20    T Axis 50    Interpretation ECG      Atrial fibrillation with slow ventricular response  Minimal voltage criteria for LVH, may be normal variant ( [place] product )  Abnormal ECG         My interpretation     Imaging:   Chest XR,  PA & LAT   Final Result   IMPRESSION: Small bilateral pleural effusions. Engorged indistinct central pulmonary vasculature, correlate for interstitial pulmonary edema. Mildly enlarged heart. Atherosclerotic aorta. No acute osseous abnormality.      POC US ECHO LIMITED   Final Result     Cardiac  Ultrasound      Procedure Name: POC Ultrasound Cardiac Exam      Indication: Shortness of breath      Views: Subxiphoid 4 chamber view , Parasternal long axis view , Parasternal short axis view , Apical 4 chamber view , Subxiphoid long axis, IVC , and bilateral lungs      Findings: Normal Cardiac Activity , No pericardial effusion , No cardiac tamponade , Adequate left ventricular function , No right ventricular strain, and right-sided B-lines, bilateral pleural effusions, IVC collapsible      Impression: No sonographic evidence of significant cardiac dysfunction , No sonographic evidence of significant pericardial Effusion , Normal global ventricular function , and No sonographic evidence of RV size dilation       Study performed by: JORGE DE JESUS MD       Images archived: Yes             Report(s) per radiology.      Laboratory:   Labs Ordered and Resulted from Time of ED Arrival to Time of ED Departure   BASIC METABOLIC PANEL - Abnormal       Result Value    Sodium 135      Potassium 3.8      Chloride 99      Carbon Dioxide (CO2) 21 (*)     Anion Gap 15      Urea Nitrogen 69.8 (*)     Creatinine 4.75 (*)     GFR Estimate 10 (*)     Calcium 8.2 (*)     Glucose 326 (*)    TROPONIN T, HIGH SENSITIVITY - Abnormal    Troponin T, High Sensitivity 110 (*)    NT PROBNP INPATIENT - Abnormal    N terminal Pro BNP Inpatient 6,777 (*)    CBC WITH PLATELETS AND DIFFERENTIAL - Abnormal    WBC Count 7.5      RBC Count 3.39 (*)     Hemoglobin 9.5 (*)     Hematocrit 28.9 (*)     MCV 85      MCH 28.0      MCHC 32.9      RDW 14.0      Platelet Count 261      % Neutrophils 75      % Lymphocytes 13      % Monocytes 9      % Eosinophils 2      % Basophils 1      % Immature Granulocytes 0      NRBCs per 100 WBC 0      Absolute Neutrophils 5.6      Absolute Lymphocytes 1.0      Absolute Monocytes 0.7      Absolute Eosinophils 0.2      Absolute Basophils 0.1      Absolute Immature Granulocytes 0.0      Absolute NRBCs 0.0     BLOOD  GAS VENOUS    pH Venous 7.36      pCO2 Venous 42      pO2 Venous 40      Bicarbonate Venous 24      Base Excess/Deficit Venous -1.8      FIO2 100      Oxyhemoglobin Venous 70      O2 Sat, Venous 70.7     TROPONIN T, HIGH SENSITIVITY   MAGNESIUM   PHOSPHORUS   TSH WITH FREE T4 REFLEX   GLUCOSE MONITOR NURSING POCT   GLUCOSE MONITOR NURSING POCT   GLUCOSE MONITOR NURSING POCT        Procedures  None performed    Interventions & Assessments:  Interventions:  Medications   lidocaine 1 % 0.1-1 mL (has no administration in time range)   lidocaine (LMX4) cream (has no administration in time range)   sodium chloride (PF) 0.9% PF flush 3 mL (has no administration in time range)   sodium chloride (PF) 0.9% PF flush 3 mL (has no administration in time range)   senna-docusate (SENOKOT-S/PERICOLACE) 8.6-50 MG per tablet 1 tablet (has no administration in time range)     Or   senna-docusate (SENOKOT-S/PERICOLACE) 8.6-50 MG per tablet 2 tablet (has no administration in time range)   calcium carbonate (TUMS) chewable tablet 1,000 mg (has no administration in time range)   acetaminophen (TYLENOL) tablet 650 mg (has no administration in time range)     Or   acetaminophen (TYLENOL) Suppository 650 mg (has no administration in time range)   ondansetron (ZOFRAN ODT) ODT tab 4 mg (has no administration in time range)     Or   ondansetron (ZOFRAN) injection 4 mg (has no administration in time range)   glucose gel 15-30 g (has no administration in time range)     Or   dextrose 50 % injection 25-50 mL (has no administration in time range)     Or   glucagon injection 1 mg (has no administration in time range)   insulin aspart (NovoLOG) injection (RAPID ACTING) (has no administration in time range)   insulin aspart (NovoLOG) injection (RAPID ACTING) (has no administration in time range)   bumetanide (BUMEX) injection 2 mg (has no administration in time range)   furosemide (LASIX) injection 80 mg (80 mg Intravenous $Given 6/19/24 7570)         Assessments:  Consultations/Discussion of Management or Tests:  Independent Interpretation (X-rays, CT Head, rhythm strip):  ED Course as of 06/19/24 1733   Wed Jun 19, 2024   1408 I initially assessed the patient and obtained the above history and physical exam.     1510 I rechecked with the patient.   1614 I consulted with Dr. Glynn, admitting hospitalist about plan of care for patient.   1656 Chest XR,  PA & LAT  I independently interpreted the patient's chest x-ray; fluffy infiltrates and small bilateral pleural effusions         Social Determinants of Health affecting care:   None.      Disposition:  The patient was admitted to the hospital under the care of Dr. Glynn.     Impression & Plan        MIPS (If applicable):  N/A    Medical Decision Making:  Patient presenting with shortness of breath.  Vital signs on arrival notable for hypoxemia on room air, 83%.  Patient has no history of oxygen dependence.  Exam notable for mildly increased work of breathing.  Considered differential including acute coronary syndrome, new CHF, hypervolemia secondary to decreased renal function, pneumonia, among others.  Point-of-care ultrasound notable for bilateral pleural effusions, B-lines, adequate ejection fraction, suggestive of hypovolemic state.  This was consistent with elevated BNP.  Chest x-ray also showed likely pulmonary edema and pleural effusions.  Patient does have an elevated troponin but suspect this is secondary to demand ischemia in the setting of significant hypoxia rather than acute coronary syndrome.  EKG had no acute appearing ischemic changes.  There is no evidence of infiltrate on chest x-ray, and with no fever or leukocytosis, low suspicion for pneumonia.  Given overall clinical picture, also low suspicion for concomitant pulmonary embolism.  Treated patient with IV Lasix equivalent of daily bumetanide dosing.  Patient was admitted to hospitalist for further evaluation and  management.    Diagnosis:    ICD-10-CM    1. Acute hypoxemic respiratory failure (H)  J96.01       2. Troponin level elevated  R79.89       3. CKD (chronic kidney disease) stage 5, GFR less than 15 ml/min (H)  N18.5       4. Obesity, Class I, BMI 30-34.9  E66.9       5. Bilateral pleural effusion  J90            Discharge Medications:  New Prescriptions    No medications on file       Christine Pineda  6/19/2024       Dennis Hunter MD  06/19/24 1735

## 2024-06-20 LAB
ANION GAP SERPL CALCULATED.3IONS-SCNC: 14 MMOL/L (ref 7–15)
ATRIAL RATE - MUSE: 68 BPM
BUN SERPL-MCNC: 70.8 MG/DL (ref 8–23)
CALCIUM SERPL-MCNC: 8 MG/DL (ref 8.8–10.2)
CHLORIDE SERPL-SCNC: 102 MMOL/L (ref 98–107)
CREAT SERPL-MCNC: 4.65 MG/DL (ref 0.51–0.95)
DEPRECATED HCO3 PLAS-SCNC: 20 MMOL/L (ref 22–29)
DIASTOLIC BLOOD PRESSURE - MUSE: NORMAL MMHG
EGFRCR SERPLBLD CKD-EPI 2021: 10 ML/MIN/1.73M2
ERYTHROCYTE [DISTWIDTH] IN BLOOD BY AUTOMATED COUNT: 14 % (ref 10–15)
GLUCOSE BLDC GLUCOMTR-MCNC: 152 MG/DL (ref 70–99)
GLUCOSE BLDC GLUCOMTR-MCNC: 155 MG/DL (ref 70–99)
GLUCOSE BLDC GLUCOMTR-MCNC: 200 MG/DL (ref 70–99)
GLUCOSE BLDC GLUCOMTR-MCNC: 240 MG/DL (ref 70–99)
GLUCOSE BLDC GLUCOMTR-MCNC: 319 MG/DL (ref 70–99)
GLUCOSE SERPL-MCNC: 167 MG/DL (ref 70–99)
HBV SURFACE AG SERPL QL IA: NONREACTIVE
HCT VFR BLD AUTO: 28.7 % (ref 35–47)
HGB BLD-MCNC: 9.2 G/DL (ref 11.7–15.7)
INTERPRETATION ECG - MUSE: NORMAL
MCH RBC QN AUTO: 27.5 PG (ref 26.5–33)
MCHC RBC AUTO-ENTMCNC: 32.1 G/DL (ref 31.5–36.5)
MCV RBC AUTO: 86 FL (ref 78–100)
P AXIS - MUSE: 67 DEGREES
PLATELET # BLD AUTO: 257 10E3/UL (ref 150–450)
POTASSIUM SERPL-SCNC: 3.8 MMOL/L (ref 3.4–5.3)
PR INTERVAL - MUSE: 248 MS
QRS DURATION - MUSE: 98 MS
QT - MUSE: 468 MS
QTC - MUSE: 497 MS
R AXIS - MUSE: 45 DEGREES
RBC # BLD AUTO: 3.34 10E6/UL (ref 3.8–5.2)
SODIUM SERPL-SCNC: 136 MMOL/L (ref 135–145)
SYSTOLIC BLOOD PRESSURE - MUSE: NORMAL MMHG
T AXIS - MUSE: 56 DEGREES
VENTRICULAR RATE- MUSE: 68 BPM
WBC # BLD AUTO: 8.1 10E3/UL (ref 4–11)

## 2024-06-20 PROCEDURE — 99223 1ST HOSP IP/OBS HIGH 75: CPT | Performed by: INTERNAL MEDICINE

## 2024-06-20 PROCEDURE — 36415 COLL VENOUS BLD VENIPUNCTURE: CPT | Performed by: INTERNAL MEDICINE

## 2024-06-20 PROCEDURE — 120N000001 HC R&B MED SURG/OB

## 2024-06-20 PROCEDURE — 250N000013 HC RX MED GY IP 250 OP 250 PS 637: Performed by: STUDENT IN AN ORGANIZED HEALTH CARE EDUCATION/TRAINING PROGRAM

## 2024-06-20 PROCEDURE — 80048 BASIC METABOLIC PNL TOTAL CA: CPT | Performed by: INTERNAL MEDICINE

## 2024-06-20 PROCEDURE — 87340 HEPATITIS B SURFACE AG IA: CPT | Performed by: INTERNAL MEDICINE

## 2024-06-20 PROCEDURE — 250N000011 HC RX IP 250 OP 636: Mod: JZ | Performed by: INTERNAL MEDICINE

## 2024-06-20 PROCEDURE — 250N000013 HC RX MED GY IP 250 OP 250 PS 637: Performed by: INTERNAL MEDICINE

## 2024-06-20 PROCEDURE — 85027 COMPLETE CBC AUTOMATED: CPT | Performed by: INTERNAL MEDICINE

## 2024-06-20 PROCEDURE — 99232 SBSQ HOSP IP/OBS MODERATE 35: CPT | Performed by: STUDENT IN AN ORGANIZED HEALTH CARE EDUCATION/TRAINING PROGRAM

## 2024-06-20 RX ORDER — CEFAZOLIN SODIUM 2 G/100ML
2 INJECTION, SOLUTION INTRAVENOUS
Status: COMPLETED | OUTPATIENT
Start: 2024-06-21 | End: 2024-06-21

## 2024-06-20 RX ORDER — FERROUS SULFATE 325(65) MG
325 TABLET ORAL DAILY
Status: DISCONTINUED | OUTPATIENT
Start: 2024-06-20 | End: 2024-06-21

## 2024-06-20 RX ORDER — CHLOROTHIAZIDE SODIUM 500 MG/1
500 INJECTION INTRAVENOUS ONCE
Status: COMPLETED | OUTPATIENT
Start: 2024-06-20 | End: 2024-06-20

## 2024-06-20 RX ORDER — ERGOCALCIFEROL 1.25 MG/1
1250 CAPSULE, LIQUID FILLED ORAL
Status: DISCONTINUED | OUTPATIENT
Start: 2024-06-20 | End: 2024-06-23 | Stop reason: HOSPADM

## 2024-06-20 RX ADMIN — FERROUS SULFATE TAB 325 MG (65 MG ELEMENTAL FE) 325 MG: 325 (65 FE) TAB at 20:38

## 2024-06-20 RX ADMIN — BUMETANIDE 2 MG: 0.25 INJECTION INTRAMUSCULAR; INTRAVENOUS at 01:15

## 2024-06-20 RX ADMIN — BUMETANIDE 2 MG: 0.25 INJECTION INTRAMUSCULAR; INTRAVENOUS at 16:42

## 2024-06-20 RX ADMIN — CALCITRIOL CAPSULES 0.25 MCG 0.25 MCG: 0.25 CAPSULE ORAL at 09:02

## 2024-06-20 RX ADMIN — SODIUM BICARBONATE 650 MG TABLET 650 MG: at 16:42

## 2024-06-20 RX ADMIN — HYDRALAZINE HYDROCHLORIDE 25 MG: 25 TABLET, FILM COATED ORAL at 16:42

## 2024-06-20 RX ADMIN — ERGOCALCIFEROL 1250 MCG: 1.25 CAPSULE, LIQUID FILLED ORAL at 20:39

## 2024-06-20 RX ADMIN — INSULIN ASPART 1 UNITS: 100 INJECTION, SOLUTION INTRAVENOUS; SUBCUTANEOUS at 09:03

## 2024-06-20 RX ADMIN — BUMETANIDE 2 MG: 0.25 INJECTION INTRAMUSCULAR; INTRAVENOUS at 09:02

## 2024-06-20 RX ADMIN — HYDRALAZINE HYDROCHLORIDE 25 MG: 25 TABLET, FILM COATED ORAL at 09:02

## 2024-06-20 RX ADMIN — LEVOTHYROXINE SODIUM 175 MCG: 125 TABLET ORAL at 09:02

## 2024-06-20 RX ADMIN — INSULIN ASPART 1 UNITS: 100 INJECTION, SOLUTION INTRAVENOUS; SUBCUTANEOUS at 20:38

## 2024-06-20 RX ADMIN — SODIUM BICARBONATE 650 MG TABLET 650 MG: at 09:02

## 2024-06-20 RX ADMIN — HYDRALAZINE HYDROCHLORIDE 25 MG: 25 TABLET, FILM COATED ORAL at 22:11

## 2024-06-20 RX ADMIN — INSULIN ASPART 1 UNITS: 100 INJECTION, SOLUTION INTRAVENOUS; SUBCUTANEOUS at 12:32

## 2024-06-20 RX ADMIN — SODIUM BICARBONATE 650 MG TABLET 650 MG: at 22:11

## 2024-06-20 RX ADMIN — CHLOROTHIAZIDE SODIUM 500 MG: 500 INJECTION, POWDER, LYOPHILIZED, FOR SOLUTION INTRAVENOUS at 12:25

## 2024-06-20 ASSESSMENT — ACTIVITIES OF DAILY LIVING (ADL)
ADLS_ACUITY_SCORE: 23
DEPENDENT_IADLS:: INDEPENDENT
ADLS_ACUITY_SCORE: 23

## 2024-06-20 NOTE — PROVIDER NOTIFICATION
Paged crosscover to clarify drawing a lactic acid.     2343: hello - pt triggered sepsis 6 hours ago but a lactic acid was never drawn. Would you like one to be drawn at this time? Pt is afebrile and is here for volume overload.     2344: Dr. Sheryl Negrete acknowledged and stated there is no reason to draw a lactic acid unless pt triggers sepsis again.

## 2024-06-20 NOTE — PLAN OF CARE
"  Problem: Adult Inpatient Plan of Care  Goal: Plan of Care Review  Description: The Plan of Care Review/Shift note should be completed every shift.  The Outcome Evaluation is a brief statement about your assessment that the patient is improving, declining, or no change.  This information will be displayed automatically on your shift  note.  Outcome: Progressing  Flowsheets (Taken 6/19/2024 4514)  Outcome Evaluation: Oxygen 92% on 5LNC. +3 edema to BLE. Up with assist of 1 to bathroom. Tele  NSR. SOB with activities.  Plan of Care Reviewed With: patient  Overall Patient Progress: improving  Goal: Patient-Specific Goal (Individualized)  Description: You can add care plan individualizations to a care plan. Examples of Individualization might be:  \"Parent requests to be called daily at 9am for status\", \"I have a hard time hearing out of my right ear\", or \"Do not touch me to wake me up as it startles  me\".  Outcome: Progressing  Goal: Absence of Hospital-Acquired Illness or Injury  Outcome: Progressing  Intervention: Prevent and Manage VTE (Venous Thromboembolism) Risk  Recent Flowsheet Documentation  Taken 6/19/2024 1829 by Emilia Junior, RN  VTE Prevention/Management: SCDs (sequential compression devices) off  Goal: Optimal Comfort and Wellbeing  Outcome: Progressing  Goal: Readiness for Transition of Care  Outcome: Progressing  Intervention: Mutually Develop Transition Plan  Recent Flowsheet Documentation  Taken 6/19/2024 1900 by Emilia Junior, RN  Equipment Currently Used at Home: none     Problem: Gas Exchange Impaired  Goal: Optimal Gas Exchange  Outcome: Progressing     Problem: Fall Injury Risk  Goal: Absence of Fall and Fall-Related Injury  Outcome: Progressing     Problem: Comorbidity Management  Goal: Blood Glucose Levels Within Targeted Range  Outcome: Progressing  Goal: Blood Pressure in Desired Range  Outcome: Progressing     Problem: Heart Failure  Goal: Optimal Coping  Outcome: Progressing  Goal: " Optimal Cardiac Output  Outcome: Progressing  Goal: Stable Heart Rate and Rhythm  Outcome: Progressing  Goal: Optimal Functional Ability  Outcome: Progressing  Goal: Fluid and Electrolyte Balance  Outcome: Progressing  Goal: Improved Oral Intake  Outcome: Progressing  Goal: Effective Oxygenation and Ventilation  Outcome: Progressing  Intervention: Promote Airway Secretion Clearance  Recent Flowsheet Documentation  Taken 6/19/2024 1829 by Emilia Junior RN  Cough And Deep Breathing: done independently per patient  Goal: Effective Breathing Pattern During Sleep  Outcome: Progressing   Goal Outcome Evaluation:      Plan of Care Reviewed With: patient    Overall Patient Progress: improvingOverall Patient Progress: improving    Outcome Evaluation: Oxygen 92% on 5LNC. +3 edema to BLE. Up with assist of 1 to bathroom. Tele  NSR. SOB with activities.

## 2024-06-20 NOTE — PLAN OF CARE
"Goal Outcome Evaluation:      Plan of Care Reviewed With: patient    Overall Patient Progress: improving    Outcome Evaluation: Able to wean oxygen from 5L to 3L NC. Patient dyspenic on exertion but states SOB is \"much better than yesterday\".    Patient calm and cooperative. A/O x4. VS stable, able to wean oxygen from 5L NC to 3L NC. States that SOB is much better from yesterday. Dyspnea on exertion. Lung sounds are diminished. Tele SR. Around 0930, per telemetry, HR dropped to 49bpm, patient stated no SOB or chest pain at that time. HR quickly returned WDL. I/O's followed. IV bumex continued. Patient is stand by assist. Blood sugars being monitored AC HS. Renal diet but will be NPO at midnight due to AM tunneled dialysis catheter placement. Will also start dialysis tomorrow after catheter placement. Nephrology following. Continue to monitor and follow POC.     Problem: Adult Inpatient Plan of Care  Goal: Plan of Care Review  Description: The Plan of Care Review/Shift note should be completed every shift.  The Outcome Evaluation is a brief statement about your assessment that the patient is improving, declining, or no change.  This information will be displayed automatically on your shift  note.  Outcome: Progressing  Flowsheets (Taken 6/20/2024 1618)  Outcome Evaluation: Able to wean oxygen from 5L to 3L NC. Patient dyspenic on exertion but states SOB is \"much better than yesterday\".  Plan of Care Reviewed With: patient  Overall Patient Progress: improving  Goal: Patient-Specific Goal (Individualized)  Description: You can add care plan individualizations to a care plan. Examples of Individualization might be:  \"Parent requests to be called daily at 9am for status\", \"I have a hard time hearing out of my right ear\", or \"Do not touch me to wake me up as it startles  me\".  Outcome: Progressing  Goal: Absence of Hospital-Acquired Illness or Injury  Outcome: Progressing  Intervention: Identify and Manage Fall " Risk  Recent Flowsheet Documentation  Taken 6/20/2024 1549 by Trinh Tapia RN  Safety Promotion/Fall Prevention: safety round/check completed  Taken 6/20/2024 1233 by Trinh Tapia RN  Safety Promotion/Fall Prevention: safety round/check completed  Taken 6/20/2024 1003 by Trinh Tapia RN  Safety Promotion/Fall Prevention: safety round/check completed  Taken 6/20/2024 0901 by Trinh Tapia RN  Safety Promotion/Fall Prevention: safety round/check completed  Taken 6/20/2024 0730 by Trinh Tapia RN  Safety Promotion/Fall Prevention: safety round/check completed  Intervention: Prevent Skin Injury  Recent Flowsheet Documentation  Taken 6/20/2024 1003 by Trinh Tapia RN  Body Position: position changed independently  Taken 6/20/2024 0901 by Trinh Tapia RN  Skin Protection:   adhesive use limited   pulse oximeter probe site changed   transparent dressing maintained  Device Skin Pressure Protection:   adhesive use limited   pressure points protected   tubing/devices free from skin contact  Intervention: Prevent and Manage VTE (Venous Thromboembolism) Risk  Recent Flowsheet Documentation  Taken 6/20/2024 0901 by Trinh Tapia RN  VTE Prevention/Management: SCDs (sequential compression devices) off  Intervention: Prevent Infection  Recent Flowsheet Documentation  Taken 6/20/2024 1549 by Trinh Tapia RN  Infection Prevention:   equipment surfaces disinfected   hand hygiene promoted   rest/sleep promoted   single patient room provided  Taken 6/20/2024 1233 by Trinh Tapia RN  Infection Prevention:   equipment surfaces disinfected   hand hygiene promoted   rest/sleep promoted   single patient room provided  Taken 6/20/2024 1003 by Trinh Tapia RN  Infection Prevention:   equipment surfaces disinfected   hand hygiene promoted   rest/sleep promoted   single patient room provided  Taken 6/20/2024 0901 by Trinh Tapia RN  Infection  Prevention:   equipment surfaces disinfected   hand hygiene promoted   rest/sleep promoted   single patient room provided  Taken 6/20/2024 0730 by Trinh Tapia RN  Infection Prevention:   equipment surfaces disinfected   hand hygiene promoted   rest/sleep promoted   single patient room provided  Goal: Optimal Comfort and Wellbeing  Outcome: Progressing  Goal: Readiness for Transition of Care  Outcome: Progressing     Problem: Gas Exchange Impaired  Goal: Optimal Gas Exchange  Outcome: Progressing  Intervention: Optimize Oxygenation and Ventilation  Recent Flowsheet Documentation  Taken 6/20/2024 1003 by Trinh Tapia RN  Head of Bed (HOB) Positioning: HOB at 20-30 degrees  Taken 6/20/2024 0901 by Trinh Tapia RN  Airway/Ventilation Management:   airway patency maintained   calming measures promoted     Problem: Fall Injury Risk  Goal: Absence of Fall and Fall-Related Injury  Outcome: Progressing  Intervention: Identify and Manage Contributors  Recent Flowsheet Documentation  Taken 6/20/2024 1549 by Trinh Tapia RN  Medication Review/Management: medications reviewed  Taken 6/20/2024 1233 by Trinh Tapia RN  Medication Review/Management: medications reviewed  Taken 6/20/2024 1003 by Trinh Tapia RN  Medication Review/Management: medications reviewed  Taken 6/20/2024 0901 by Trinh Tapia RN  Medication Review/Management: medications reviewed  Taken 6/20/2024 0730 by Trinh Tapia RN  Medication Review/Management: medications reviewed  Intervention: Promote Injury-Free Environment  Recent Flowsheet Documentation  Taken 6/20/2024 1549 by Trinh Tapia, RN  Safety Promotion/Fall Prevention: safety round/check completed  Taken 6/20/2024 1233 by Trinh Tapia RN  Safety Promotion/Fall Prevention: safety round/check completed  Taken 6/20/2024 1003 by Trinh Tapia, RN  Safety Promotion/Fall Prevention: safety round/check completed  Taken  6/20/2024 0901 by Trinh Tapia RN  Safety Promotion/Fall Prevention: safety round/check completed  Taken 6/20/2024 0730 by Trinh Tapia RN  Safety Promotion/Fall Prevention: safety round/check completed     Problem: Comorbidity Management  Goal: Blood Glucose Levels Within Targeted Range  Outcome: Progressing  Intervention: Monitor and Manage Glycemia  Recent Flowsheet Documentation  Taken 6/20/2024 1549 by Trinh Tapia RN  Medication Review/Management: medications reviewed  Taken 6/20/2024 1233 by Trinh Tapia RN  Medication Review/Management: medications reviewed  Taken 6/20/2024 1003 by Trinh Tapia RN  Medication Review/Management: medications reviewed  Taken 6/20/2024 0901 by Trinh Tapia RN  Glycemic Management: blood glucose monitored  Medication Review/Management: medications reviewed  Taken 6/20/2024 0730 by Trinh Tapia RN  Medication Review/Management: medications reviewed  Goal: Blood Pressure in Desired Range  Outcome: Progressing  Intervention: Maintain Blood Pressure Management  Recent Flowsheet Documentation  Taken 6/20/2024 1549 by Trinh Tapia RN  Medication Review/Management: medications reviewed  Taken 6/20/2024 1233 by Trinh Tapia RN  Medication Review/Management: medications reviewed  Taken 6/20/2024 1003 by Trinh Tapia RN  Medication Review/Management: medications reviewed  Taken 6/20/2024 0901 by Trinh Tapia RN  Medication Review/Management: medications reviewed  Taken 6/20/2024 0730 by Trinh Tapia RN  Medication Review/Management: medications reviewed     Problem: Heart Failure  Goal: Optimal Coping  Outcome: Progressing  Intervention: Support Psychosocial Response  Recent Flowsheet Documentation  Taken 6/20/2024 0901 by Trinh Tapia RN  Supportive Measures:   active listening utilized   counseling provided   decision-making supported   goal-setting facilitated   positive reinforcement  provided   problem-solving facilitated   relaxation techniques promoted   self-care encouraged   self-responsibility promoted   verbalization of feelings encouraged   self-reflection promoted  Goal: Optimal Cardiac Output  Outcome: Progressing  Intervention: Optimize Cardiac Output  Recent Flowsheet Documentation  Taken 6/20/2024 0901 by Trinh Tapia RN  Environmental Support:   calm environment promoted   caregiver consistency promoted   environmental consistency promoted   personal routine supported   rest periods encouraged  Goal: Stable Heart Rate and Rhythm  Outcome: Progressing  Goal: Optimal Functional Ability  Outcome: Progressing  Intervention: Optimize Functional Ability  Recent Flowsheet Documentation  Taken 6/20/2024 1003 by Trinh Tapia RN  Activity Management:   activity adjusted per tolerance   ambulated to bathroom   back to bed  Goal: Fluid and Electrolyte Balance  Outcome: Progressing  Goal: Improved Oral Intake  Outcome: Progressing  Goal: Effective Oxygenation and Ventilation  Outcome: Progressing  Intervention: Promote Airway Secretion Clearance  Recent Flowsheet Documentation  Taken 6/20/2024 1003 by Trinh Tapia RN  Activity Management:   activity adjusted per tolerance   ambulated to bathroom   back to bed  Taken 6/20/2024 0901 by Trinh Tapia RN  Cough And Deep Breathing: done independently per patient  Intervention: Optimize Oxygenation and Ventilation  Recent Flowsheet Documentation  Taken 6/20/2024 1003 by Trinh Tapia RN  Head of Bed (HOB) Positioning: HOB at 20-30 degrees  Taken 6/20/2024 0901 by Trinh Tapia RN  Airway/Ventilation Management:   airway patency maintained   calming measures promoted  Goal: Effective Breathing Pattern During Sleep  Outcome: Progressing  Intervention: Monitor and Manage Obstructive Sleep Apnea  Recent Flowsheet Documentation  Taken 6/20/2024 1549 by Trinh Tapia RN  Medication Review/Management:  medications reviewed  Taken 6/20/2024 1233 by Trinh Tapia, RN  Medication Review/Management: medications reviewed  Taken 6/20/2024 1003 by Trinh Tapia, RN  Medication Review/Management: medications reviewed  Taken 6/20/2024 0901 by Trinh Tapia, RN  Medication Review/Management: medications reviewed  Taken 6/20/2024 0730 by Trinh Tapia, RN  Medication Review/Management: medications reviewed

## 2024-06-20 NOTE — CONSULTS
Care Management Initial Consult    General Information  Assessment completed with: Patient,    Type of CM/SW Visit: Initial Assessment    Primary Care Provider verified and updated as needed: Yes   Readmission within the last 30 days: previous discharge plan unsuccessful      Reason for Consult: care coordination/care conference, discharge planning         Communication Assessment  Patient's communication style: spoken language (English or Bilingual)    Hearing Difficulty or Deaf: no   Wear Glasses or Blind: no    Cognitive  Cognitive/Neuro/Behavioral: WDL  Level of Consciousness: alert  Arousal Level: opens eyes spontaneously  Orientation: oriented x 4  Mood/Behavior: calm, cooperative  Best Language: 0 - No aphasia  Speech: clear, spontaneous, logical    Living Environment:   People in home: child(polina), adult, grandchild(polina)     Current living Arrangements: house           Family/Social Support:  Care provided by: self          Current Resources:   Patient receiving home care services: No     Community Resources: None  Equipment currently used at home: none  Supplies currently used at home: None        Lifestyle & Psychosocial Needs:  Social Determinants of Health     Food Insecurity: Not on file   Depression: Not at risk (5/15/2024)    Received from [facility]    PHQ-2     PHQ-2 Score: 0   Housing Stability: Not on file   Tobacco Use: Unknown (6/19/2024)    Received from [facility]    Patient History     Smoking Tobacco Use: Never     Smokeless Tobacco Use: Unknown     Passive Exposure: Not on file   Financial Resource Strain: Not on file   Alcohol Use: Not on file   Transportation Needs: Not on file   Physical Activity: Not on file   Interpersonal Safety: Not on file   Stress: Not on file   Social Connections: Not on file   Health Literacy: Not on file       Functional Status:  Prior to admission patient needed assistance:   Dependent ADLs:: Independent  Dependent IADLs:: Independent                     Additional Information:  RN CC following for initiation of new Outpatient Hemodialysis. The Nephrologist has indicated that the patient will need to start OP HD for Chronic Renal Failure. Spoke to patient at bedside to discuss plan of care and verify new OP HD plan.    HD Catheter placement date: anticipate 6/21/24  First HD run: anticipate 6/21/24  Facility requested: [facility]        Days/times preferred: MWF PM shift  Plan for first OP HD run (date): anticipate Monday 6/24/24    Referral information for new OP HD was sent to [facility]missions at fax # [phone]. RN CC will continue to follow in collaboration with Nephrology and [facility]missions for the final discharge plan.    Germaine Cuello RN, BSN  Inpatient Care Coordination  [facility]  [phone]

## 2024-06-20 NOTE — PROGRESS NOTES
Patient is on IR schedule Friday 6/21/24 for a Tunneled dialysis catheter placement with IV moderate sedation.     Nisreen Mckenna is a very pleasant 64 year old female with history of stage 5 chronic kidney disease, not on dialysis, obesity, hypercholesterolemia, hypertension, type 2 diabetes presenting with shortness of breath. Patient is 85% O2 on RA. The patient reports that she has had shortness of breath and chest pain that started a few days ago and has been getting worse. Furthermore, she states she has gained 10 pounds in the last week. She endorses leg swelling but denies pain and redness. The patient notes she has not been producing much urine. Of note, the patient was evaluated by nephrology yesterday and a plan was made for placement of a peritoneal dialysis catheter.  Nephrology noted the patient's hypervolemia with plans for reevaluation by telephone later this week. Patient was also started on 2 mg bumetanide twice daily.     Patient was admitted with continued SOB and swelling and a dialysis catheter was requested to start HD while an IP.     -Labs WNL for procedure.    -Orders for NPO and antibiotics have been entered.   -Consent will be done prior to procedure.     Please contact the IR department for procedural related questions.     Discussed with IR GRECIA Prado today.    Total time: 20 minutes     Thanks, Karlie [facility] Interventional Radiology CNP ([phone]) (phone [phone])

## 2024-06-20 NOTE — PROGRESS NOTES
[facility]    Medicine Progress Note - Hospitalist Service    Date of Admission:  6/19/2024  Date of Service: 06/20/2024    Assessment & Plan       65 yo F with a history of HTN, CKD, TARA, HLD, hypothyroidism who presents hospital today for shortness of breath, weight gain, and leg swelling     Patient was most recently hospitalized 5/20/2024 through 5/22/2024 for worsening renal function and abnormal labs including hyperkalemia and acute on chronic renal failure.  The patient was seen by nephrology that admission, a follow-up clinic visit with nephrology was arranged on discharge.     ED: Presenting vital signs included systolic blood pressure near 180, heart rate near 60, no fever, and reported oxygen saturations of 84% on RA.  Patient currently on 7 L nasal cannula with saturations in the low 90% range     Pertinent labs notable for normal VBG, hemoglobin 9.5, creatinine near 4.8, BUN near 70, glucose near 330, BNP of 6700, and troponin of 110     Bedside echocardiogram performed by the ER provider appears to show bilateral pleural effusions and normal ejection fraction per his report.  Await formal radiology read     Chest x-ray ordered and pending     Patient is being admitted to the hospital service for acute hypoxic respiratory failure suspected to be due to volume overload in the setting of chronic kidney disease     1.  Acute hypoxic respiratory failure  -Suspect due to volume overload from progressive chronic kidney disease  -Recent TTE performed 5/20/2024 showed ejection fraction of 70% with borderline LVH and mild aortic stenosis  -The patient was just transitioned from Lasix 80 mg p.o. twice daily to Bumex 2 mg p.o. twice daily yesterday by her nephrologist  -CXR 06/19 -> Small bilateral pleural effusions. Engorged indistinct central pulmonary vasculature, correlate for interstitial pulmonary edema.   -Received 80 mg IV Lasix in the ER.    Plan:  -Will order Bumex 2 mg IV every 8  "hours for now  -Consulted nephrology -> Patient is on IR schedule Friday 6/21/24 for a Tunneled dialysis catheter placement for HD     2.  Acute on chronic kidney disease.    - Baseline creatinine near 4 historically  -Has not required dialysis in the past.  She has discussed the possibility of peritoneal dialysis with her nephrologist  Plan:  - Follows with Dr. Link of nephrology  - Replace mag and phos as needed  - resume bicarbonate tabs TID  - resume daily Lokelma dose     3.  Diabetes mellitus  - recent hgb A1c 9.7 in 5/24  -Treat with insulin sliding scale  -Resume appropriate home diabetic medications      4.  Obstructive sleep apnea     5.  Hypertension     6 hypothyroidism  -Check TSH for completeness -> 1.36. WNL             Diet: Renal Diet (non-dialysis)  NPO per Anesthesia Guidelines for Procedure/Surgery Except for: Meds    DVT Prophylaxis: Pneumatic Compression Devices  Diaz Catheter: Not present  Lines: None     Cardiac Monitoring: ACTIVE order. Indication: Electrolyte Imbalance (24 hours)- Magnesium <1.3 mg/ml; Potassium < =2.8 or > 5.5 mg/ml  Code Status: Full Code      Clinically Significant Risk Factors Present on Admission                 # Acute Kidney Injury, unspecified: based on a >150% or 0.3 mg/dL increase in last creatinine compared to past 90 day average, will monitor renal function  # Hypertension: Noted on problem list    # Anemia: based on hgb <11          # DMII: A1C = 9.7 % (Ref range: <5.7 %) within past 6 months    # Obesity: Estimated body mass index is 33.87 kg/m  as calculated from the following:    Height as of this encounter: 1.575 m (5' 2\").    Weight as of this encounter: 84 kg (185 lb 3.2 oz).              Disposition Plan     Medically Ready for Discharge: Anticipated in 2-4 Days             Miguel Ángel Pedersen MD  Hospitalist Service  [facility]  Securely message with Doc[employer] (more info)  Text page via [email or web address] Paging/Directory "   ______________________________________________________________________    Interval History     No acute events overnight  No CP/SOB  O2 needs now down to 4L NC  No new fevers  No nausea / vomiting   No new complaints, overall SOB is much better per the patient    Physical Exam   Vital Signs: Temp: 97.9  F (36.6  C) Temp src: Oral BP: (!) 152/47 Pulse: 61   Resp: 20 SpO2: 97 % O2 Device: Nasal cannula Oxygen Delivery: 5 LPM  Weight: 185 lbs 3.2 oz    GEN: no apparent distress  PSYCH: pleasant, oriented, No acute distress.  HEART:  Normal S1, S2 with no edema.  LUNGS:  some crackles at bases otherwise clear to auscultation, normal Respiratory effort.  ABDOMEN:  Soft, no hepatosplenomegaly, normal bowel sounds.  SKIN:  Dry to touch, No rash.   ----------------------------------------------------------------------------------------    Medical Decision Making       35 MINUTES SPENT BY ME on the date of service doing chart review, history, exam, documentation & further activities per the note.      Data   ------------------------- PAST 24 HR DATA REVIEWED -----------------------------------------------    I have personally reviewed the following data over the past 24 hrs:    8.1  \   9.2 (L)   / 257     136 102 70.8 (H) /  155 (H)   3.8 20 (L) 4.65 (H) \     Trop: 109 (HH) BNP: N/A     TSH: 1.36 T4: N/A A1C: N/A       Imaging results reviewed over the past 24 hrs:   Recent Results (from the past 24 hour(s))   Chest XR,  PA & LAT    Narrative    EXAM: XR CHEST 2 VIEWS  LOCATION: [facility]  DATE: 6/19/2024    INDICATION: sob  COMPARISON: None.      Impression    IMPRESSION: Small bilateral pleural effusions. Engorged indistinct central pulmonary vasculature, correlate for interstitial pulmonary edema. Mildly enlarged heart. Atherosclerotic aorta. No acute osseous abnormality.     ------------------------- ENCOUNTER LABS ----------------------------------------------------------------  Recent Labs    Lab 06/20/24  1230 06/20/24  0807 06/20/24  0720 06/19/24  1847 06/19/24  1444   WBC  --   --  8.1  --  7.5   HGB  --   --  9.2*  --  9.5*   MCV  --   --  86  --  85   PLT  --   --  257  --  261   NA  --   --  136  --  135   POTASSIUM  --   --  3.8  --  3.8   CHLORIDE  --   --  102  --  99   CO2  --   --  20*  --  21*   BUN  --   --  70.8*  --  69.8*   CR  --   --  4.65*  --  4.75*   ANIONGAP  --   --  14  --  15   BETH  --   --  8.0*  --  8.2*   * 152* 167*   < > 326*    < > = values in this interval not displayed.       Most Recent 3 CBC's:  Recent Labs   Lab Test 06/20/24  0720 06/19/24  1444 05/21/24  0746 05/20/24  1141   WBC 8.1 7.5  --  7.3   HGB 9.2* 9.5* 9.7* 10.7*   MCV 86 85  --  88    261  --  258     Most Recent 3 BMP's:  Recent Labs   Lab Test 06/20/24 1230 06/20/24  0807 06/20/24 0720 06/19/24 1847 06/19/24  1444 05/22/24  0843 05/22/24  0750   NA  --   --  136  --  135  --  139   POTASSIUM  --   --  3.8  --  3.8  --  4.4   CHLORIDE  --   --  102  --  99  --  107   CO2  --   --  20*  --  21*  --  18*   BUN  --   --  70.8*  --  69.8*  --  63.1*   CR  --   --  4.65*  --  4.75*  --  4.09*   ANIONGAP  --   --  14  --  15  --  14   BETH  --   --  8.0*  --  8.2*  --  8.2*   * 152* 167*   < > 326*   < > 99    < > = values in this interval not displayed.     Most Recent 2 LFT's:No lab results found.  Most Recent 3 INR's:No lab results found.  Most Recent 3 Troponin's:No lab results found.  Most Recent 3 BNP's:  Recent Labs   Lab Test 06/19/24  1444 05/20/24  1309   NTBNPI 6,777* 2,332*     Most Recent D-dimer:No lab results found.  Most Recent Cholesterol Panel:No lab results found.  Most Recent 6 Bacteria Isolates From Any Culture (See EPIC Reports for Culture Details):No lab results found.  Most Recent TSH and T4:  Recent Labs   Lab Test 06/19/24  1658   TSH 1.36     Most Recent Urinalysis:  Recent Labs   Lab Test 05/20/24  1240   COLOR Straw   APPEARANCE Clear   URINEGLC 30*    URINEBILI Negative   URINEKETONE Negative   SG 1.010   UBLD Trace*   URINEPH 5.5   PROTEIN 300*   NITRITE Negative   LEUKEST Negative   RBCU <1   WBCU 2     Most Recent ESR & CRP:No lab results found.

## 2024-06-20 NOTE — CONSULTS
Nephrology Consultation      Nisreen Mckenna MRN# [id number]   YOB: 1960 Age: 64 year old   Date of Admission: 6/19/2024     Reason for consult: I was asked by Dr. Glynn to evaulate this patient for ESRD and initiation of dialysis.           Assessment and Plan:   ESRD: diabetic and hypertensive nephropathy. She is followed by Dr. Cobian, [facility].She has developed progressive CKD and is now CKD-5.     We will place CVC in am by IR and then initiate HD. Orders are placed for dialysis and I discussed with the dialysis nurse. We will do 2 runs in a row for UF and clearance. She eventually wants to start PD but she will need to be stabilized on HD first.    Anemia: due to CKD and Fe deficiency. Will give venofer and EPO on dialysis.     Hyperkalemia: has been on lokelma. K today is 3.8 and she is starting dialysis in am. Will stop lokelma.     Fluid overload: continue IV diuretics today and likely able to stop after she begins dialysis. Add chlorothiazide IV for one dose.     I will ask the care coordinator to arrange outpatient dialysis.    History is obtained from the patient and electronic health record         History of Present Illness:   This patient is a 64 year old female who presents with SOB, wt gain and edema. She has progressive CKD due to DM and HT. She was seen in our clinic 6/18/24 and her diuretics were adjusted. Her labs 6/18 showed Cr 4.95, eGFR 9 C/W CKD-5. She presented to ER today with SOB, increased edema and wt gain. Her O2 sat was 84% on RA. CXR C/W mild fluid overload. She was treated with IV lasix and bumex and admitted.     She wants to do PD eventually once stabilized.     PTA she was on Bumex po, hydralazine, sodium bicarbonate, calcitriiol, FeSO4 and lokelma.     PMHx includes CKD-5, DM, HT, TARA, hypothyroidism.                    Past Medical History:   No past medical history on file.          Past Surgical History:   No past surgical history on file.             Social History:     Social History     Tobacco Use    Smoking status: Not on file    Smokeless tobacco: Not on file   Substance Use Topics    Alcohol use: Not on file             Family History:   No family history on file.          Allergies:   No Known Allergies          Medications:     Current Facility-Administered Medications   Medication Dose Route Frequency Provider Last Rate Last Admin    bumetanide (BUMEX) injection 2 mg  2 mg Intravenous Q8H Michael Glynn MD   2 mg at 24 09    calcitRIOL (ROCALTROL) capsule 0.25 mcg  0.25 mcg Oral Daily Michael Glynn MD   0.25 mcg at 24 09    hydrALAZINE (APRESOLINE) tablet 25 mg  25 mg Oral TID Michael Glynn MD   25 mg at 24 09    insulin aspart (NovoLOG) injection (RAPID ACTING)  1-7 Units Subcutaneous TID AC Michael Glynn MD   1 Units at 24    insulin aspart (NovoLOG) injection (RAPID ACTING)  1-5 Units Subcutaneous At Bedtime Michael Glynn MD   1 Units at 24    levothyroxine (SYNTHROID/LEVOTHROID) tablet 175 mcg  175 mcg Oral Daily Michael Glynn MD   175 mcg at 24 09    sodium bicarbonate tablet 650 mg  650 mg Oral TID Michael Glynn MD   650 mg at 24 09    sodium chloride (PF) 0.9% PF flush 3 mL  3 mL Intracatheter Q8H Michael Glynn MD   3 mL at 24    sodium zirconium cyclosilicate (LOKELMA) packet 5 g  5 g Oral Daily Michael Glynn MD                 Review of Systems:     Neg except as noted above.            Physical Exam:   Vitals were reviewed  Temp: 97.9  F (36.6  C) Temp src: Oral BP: (!) 152/47 Pulse: 61   Resp: 20 SpO2: 97 % O2 Device: Nasal cannula Oxygen Delivery: 5 LPM  Blood pressure range: Systolic (24hrs), Av , Min:142 , Max:178     Blood pressure range: Diastolic (24hrs), Av, Min:47, Max:74    Alert, responsive, on nasal cannula O2  Head shows no trauma  Neck is supple  Cardiac exam shows mild jugular  venous distention normal S1-S2 3/6 systolic murmur loudest in the aortic area  Lungs show bibasilar rales  Lower extremities show marked edema, upper extremities have normal pulses and look good         Data:   All laboratory data reviewed  Results for orders placed or performed during the hospital encounter of 06/19/24 (from the past 24 hour(s))   POC US ECHO LIMITED    Impression      Cardiac Ultrasound    Procedure Name: POC Ultrasound Cardiac Exam    Indication: Shortness of breath    Views: Subxiphoid 4 chamber view , Parasternal long axis view , Parasternal short axis view , Apical 4 chamber view , Subxiphoid long axis, IVC , and bilateral lungs    Findings: Normal Cardiac Activity , No pericardial effusion , No cardiac tamponade , Adequate left ventricular function , No right ventricular strain, and right-sided B-lines, bilateral pleural effusions, IVC collapsible    Impression: No sonographic evidence of significant cardiac dysfunction , No sonographic evidence of significant pericardial Effusion , Normal global ventricular function , and No sonographic evidence of RV size dilation     Study performed by: JORGE DE JESUS MD     Images archived: Yes     EKG 12-lead, tracing only   Result Value Ref Range    Systolic Blood Pressure  mmHg    Diastolic Blood Pressure  mmHg    Ventricular Rate 59 BPM    Atrial Rate  BPM    KY Interval  ms    QRS Duration 112 ms     ms    QTc 467 ms    P Axis  degrees    R AXIS 20 degrees    T Axis 50 degrees    Interpretation ECG       Atrial fibrillation with slow ventricular response  Minimal voltage criteria for LVH, may be normal variant ( Abebe product )  Abnormal ECG  When compared with ECG of 20-MAY-2024 12:36,  Atrial fibrillation has replaced Sinus rhythm  QT has lengthened  Unconfirmed report - interpretation of this ECG is computer generated - see medical record for final interpretation  Confirmed by Pablo Johnson ([id number]),  Jorge A Calero ([id number]) on  6/19/2024 2:48:39 PM     CBC with platelets differential    Narrative    The following orders were created for panel order CBC with platelets differential.  Procedure                               Abnormality         Status                     ---------                               -----------         ------                     CBC with platelets and d...[[id number]]  Abnormal            Final result                 Please view results for these tests on the individual orders.   Basic metabolic panel   Result Value Ref Range    Sodium 135 135 - 145 mmol/L    Potassium 3.8 3.4 - 5.3 mmol/L    Chloride 99 98 - 107 mmol/L    Carbon Dioxide (CO2) 21 (L) 22 - 29 mmol/L    Anion Gap 15 7 - 15 mmol/L    Urea Nitrogen 69.8 (H) 8.0 - 23.0 mg/dL    Creatinine 4.75 (H) 0.51 - 0.95 mg/dL    GFR Estimate 10 (L) >60 mL/min/1.73m2    Calcium 8.2 (L) 8.8 - 10.2 mg/dL    Glucose 326 (H) 70 - 99 mg/dL   Troponin T, High Sensitivity   Result Value Ref Range    Troponin T, High Sensitivity 110 (HH) <=14 ng/L   Nt probnp inpatient   Result Value Ref Range    N terminal Pro BNP Inpatient 6,777 (H) 0 - 900 pg/mL   Blood gas venous   Result Value Ref Range    pH Venous 7.36 7.32 - 7.43    pCO2 Venous 42 40 - 50 mm Hg    pO2 Venous 40 25 - 47 mm Hg    Bicarbonate Venous 24 21 - 28 mmol/L    Base Excess/Deficit Venous -1.8 -3.0 - 3.0 mmol/L    FIO2 100     Oxyhemoglobin Venous 70 70 - 75 %    O2 Sat, Venous 70.7 70.0 - 75.0 %    Narrative    In healthy individuals, oxyhemoglobin (O2Hb) and oxygen saturation (SO2) are approximately equal. In the presence of dyshemoglobins, oxyhemoglobin can be considerably lower than oxygen saturation.   CBC with platelets and differential   Result Value Ref Range    WBC Count 7.5 4.0 - 11.0 10e3/uL    RBC Count 3.39 (L) 3.80 - 5.20 10e6/uL    Hemoglobin 9.5 (L) 11.7 - 15.7 g/dL    Hematocrit 28.9 (L) 35.0 - 47.0 %    MCV 85 78 - 100 fL    MCH 28.0 26.5 - 33.0 pg    MCHC 32.9 31.5 - 36.5 g/dL    RDW 14.0 10.0  - 15.0 %    Platelet Count 261 150 - 450 10e3/uL    % Neutrophils 75 %    % Lymphocytes 13 %    % Monocytes 9 %    % Eosinophils 2 %    % Basophils 1 %    % Immature Granulocytes 0 %    NRBCs per 100 WBC 0 <1 /100    Absolute Neutrophils 5.6 1.6 - 8.3 10e3/uL    Absolute Lymphocytes 1.0 0.8 - 5.3 10e3/uL    Absolute Monocytes 0.7 0.0 - 1.3 10e3/uL    Absolute Eosinophils 0.2 0.0 - 0.7 10e3/uL    Absolute Basophils 0.1 0.0 - 0.2 10e3/uL    Absolute Immature Granulocytes 0.0 <=0.4 10e3/uL    Absolute NRBCs 0.0 10e3/uL   Chest XR,  PA & LAT    Narrative    EXAM: XR CHEST 2 VIEWS  LOCATION: [facility]  DATE: 6/19/2024    INDICATION: sob  COMPARISON: None.      Impression    IMPRESSION: Small bilateral pleural effusions. Engorged indistinct central pulmonary vasculature, correlate for interstitial pulmonary edema. Mildly enlarged heart. Atherosclerotic aorta. No acute osseous abnormality.   Troponin T, High Sensitivity   Result Value Ref Range    Troponin T, High Sensitivity 109 (HH) <=14 ng/L   Magnesium   Result Value Ref Range    Magnesium 2.1 1.7 - 2.3 mg/dL   Phosphorus   Result Value Ref Range    Phosphorus 5.1 (H) 2.5 - 4.5 mg/dL   TSH with free T4 reflex   Result Value Ref Range    TSH 1.36 0.30 - 4.20 uIU/mL   EKG 12-lead, tracing only   Result Value Ref Range    Systolic Blood Pressure  mmHg    Diastolic Blood Pressure  mmHg    Ventricular Rate 68 BPM    Atrial Rate 68 BPM    NV Interval 248 ms    QRS Duration 98 ms     ms    QTc 497 ms    P Axis 67 degrees    R AXIS 45 degrees    T Axis 56 degrees    Interpretation ECG       Sinus rhythm with 1st degree A-V block with Premature supraventricular complexes  Minimal voltage criteria for LVH, may be normal variant ( Abebe product )  Prolonged QT  Abnormal ECG  When compared with ECG of 19-JUN-2024 14:41,  Sinus rhythm has replaced Atrial fibrillation  Confirmed by - EMERGENCY ROOM, PHYSICIAN ([id number]),  NABILA MARTINEZ ([id number]) on  6/20/2024 6:36:34 AM     Glucose by meter   Result Value Ref Range    GLUCOSE BY METER POCT 229 (H) 70 - 99 mg/dL   Glucose by meter   Result Value Ref Range    GLUCOSE BY METER POCT 246 (H) 70 - 99 mg/dL   Glucose by meter   Result Value Ref Range    GLUCOSE BY METER POCT 200 (H) 70 - 99 mg/dL   Basic metabolic panel   Result Value Ref Range    Sodium 136 135 - 145 mmol/L    Potassium 3.8 3.4 - 5.3 mmol/L    Chloride 102 98 - 107 mmol/L    Carbon Dioxide (CO2) 20 (L) 22 - 29 mmol/L    Anion Gap 14 7 - 15 mmol/L    Urea Nitrogen 70.8 (H) 8.0 - 23.0 mg/dL    Creatinine 4.65 (H) 0.51 - 0.95 mg/dL    GFR Estimate 10 (L) >60 mL/min/1.73m2    Calcium 8.0 (L) 8.8 - 10.2 mg/dL    Glucose 167 (H) 70 - 99 mg/dL   CBC with platelets   Result Value Ref Range    WBC Count 8.1 4.0 - 11.0 10e3/uL    RBC Count 3.34 (L) 3.80 - 5.20 10e6/uL    Hemoglobin 9.2 (L) 11.7 - 15.7 g/dL    Hematocrit 28.7 (L) 35.0 - 47.0 %    MCV 86 78 - 100 fL    MCH 27.5 26.5 - 33.0 pg    MCHC 32.1 31.5 - 36.5 g/dL    RDW 14.0 10.0 - 15.0 %    Platelet Count 257 150 - 450 10e3/uL   Glucose by meter   Result Value Ref Range    GLUCOSE BY METER POCT 152 (H) 70 - 99 mg/dL

## 2024-06-20 NOTE — PLAN OF CARE
"Goal Outcome Evaluation:    Time: 2300 - 0730  Reason for Admission: Acute respiratory failure  Activity: SBA with a gait belt  Neuro: A&O x4, pt able to make her needs be known.   Cardiac: Denies any chest pain or palpations. Telemetry: Sinus rhythm.  Receiving IV Bumex 2 mg every 8 hours.   Respiratory: Lung sounds clear bilaterally; Pt still experiencing dyspnea with activity, but reporting it feels \"better\" this morning. O2 sat 93% on 5 lpm via NC.   GI/: Denies any N&V. Has been able to void without difficult. Bowel sounds active x4, passing flatus.   Skin: See flowsheet.   Diet: Renal diet  IV Access: 18 gauge in right forearm. Saline locked.   Vitals:BP (!) 167/71 (BP Location: Left arm)   Pulse 76   Temp 97.9  F (36.6  C) (Oral)   Resp 22   Ht 1.575 m (5' 2\")   Wt 84 kg (185 lb 3.2 oz)   SpO2 92%   BMI 33.87 kg/m     Pain: Denies any pain.   Plan: IV Bumex 2 mg every 8 hours. Nephrology consult today.         Plan of Care Reviewed With: patient        Problem: Adult Inpatient Plan of Care  Goal: Plan of Care Review  Description: The Plan of Care Review/Shift note should be completed every shift.  The Outcome Evaluation is a brief statement about your assessment that the patient is improving, declining, or no change.  This information will be displayed automatically on your shift  note.  Outcome: Progressing  Flowsheets (Taken 6/20/2024 0609)  Outcome Evaluation: Pt O2 sat 93% on 5 lpm via NC, receiving IV Bumex 2 mg every 8 hour, BLE edema +2/+3. Pt reports dyspnea with activity has improved.  Plan of Care Reviewed With: patient  Goal: Patient-Specific Goal (Individualized)  Description: You can add care plan individualizations to a care plan. Examples of Individualization might be:  \"Parent requests to be called daily at 9am for status\", \"I have a hard time hearing out of my right ear\", or \"Do not touch me to wake me up as it startles  me\".  Outcome: Progressing  Goal: Absence of Hospital-Acquired " Illness or Injury  Outcome: Progressing  Intervention: Identify and Manage Fall Risk  Recent Flowsheet Documentation  Taken 6/20/2024 0120 by Sarah Randhawa  Safety Promotion/Fall Prevention:   assistive device/personal items within reach   clutter free environment maintained   nonskid shoes/slippers when out of bed  Intervention: Prevent Skin Injury  Recent Flowsheet Documentation  Taken 6/20/2024 0120 by Sarah Randhawa  Body Position: position changed independently  Intervention: Prevent and Manage VTE (Venous Thromboembolism) Risk  Recent Flowsheet Documentation  Taken 6/20/2024 0120 by Sarah Randhawa  VTE Prevention/Management: SCDs (sequential compression devices) off  Intervention: Prevent Infection  Recent Flowsheet Documentation  Taken 6/20/2024 0120 by Sarah Randhawa  Infection Prevention:   hand hygiene promoted   rest/sleep promoted  Goal: Optimal Comfort and Wellbeing  Outcome: Progressing  Goal: Readiness for Transition of Care  Outcome: Progressing     Problem: Gas Exchange Impaired  Goal: Optimal Gas Exchange  Outcome: Progressing  Intervention: Optimize Oxygenation and Ventilation  Recent Flowsheet Documentation  Taken 6/20/2024 0120 by Sarah Randhawa  Head of Bed (HOB) Positioning: HOB at 45 degrees     Problem: Fall Injury Risk  Goal: Absence of Fall and Fall-Related Injury  Outcome: Progressing  Intervention: Identify and Manage Contributors  Recent Flowsheet Documentation  Taken 6/20/2024 0120 by Sarah Randhawa  Medication Review/Management: medications reviewed  Intervention: Promote Injury-Free Environment  Recent Flowsheet Documentation  Taken 6/20/2024 0120 by Sarah Randhawa  Safety Promotion/Fall Prevention:   assistive device/personal items within reach   clutter free environment maintained   nonskid shoes/slippers when out of bed     Problem: Comorbidity Management  Goal: Blood Glucose Levels Within Targeted Range  Outcome: Progressing  Intervention: Monitor and  Manage Glycemia  Recent Flowsheet Documentation  Taken 6/20/2024 0120 by Sarah Randhawa  Medication Review/Management: medications reviewed  Goal: Blood Pressure in Desired Range  Outcome: Progressing  Intervention: Maintain Blood Pressure Management  Recent Flowsheet Documentation  Taken 6/20/2024 0120 by Sarah Randhawa  Medication Review/Management: medications reviewed     Problem: Heart Failure  Goal: Optimal Coping  Outcome: Progressing  Goal: Optimal Cardiac Output  Outcome: Progressing  Goal: Stable Heart Rate and Rhythm  Outcome: Progressing  Goal: Optimal Functional Ability  Outcome: Progressing  Intervention: Optimize Functional Ability  Recent Flowsheet Documentation  Taken 6/20/2024 0120 by Sarah Randhawa  Activity Management: ambulated to bathroom  Goal: Fluid and Electrolyte Balance  Outcome: Progressing  Goal: Improved Oral Intake  Outcome: Progressing  Goal: Effective Oxygenation and Ventilation  Outcome: Progressing  Intervention: Promote Airway Secretion Clearance  Recent Flowsheet Documentation  Taken 6/20/2024 0120 by Sarah Randhawa  Cough And Deep Breathing: done independently per patient  Activity Management: ambulated to bathroom  Intervention: Optimize Oxygenation and Ventilation  Recent Flowsheet Documentation  Taken 6/20/2024 0120 by Sarah Randhawa  Head of Bed (HOB) Positioning: HOB at 45 degrees  Goal: Effective Breathing Pattern During Sleep  Outcome: Progressing  Intervention: Monitor and Manage Obstructive Sleep Apnea  Recent Flowsheet Documentation  Taken 6/20/2024 0120 by Sarah Randhawa  Medication Review/Management: medications reviewed       Outcome Evaluation: Pt O2 sat 93% on 5 lpm via NC, receiving IV Bumex 2 mg every 8 hour, BLE edema +2/+3. Pt reports dyspnea with activity has improved.

## 2024-06-21 ENCOUNTER — APPOINTMENT (OUTPATIENT)
Dept: INTERVENTIONAL RADIOLOGY/VASCULAR | Facility: CLINIC | Age: 64
DRG: 673 | End: 2024-06-21
Attending: INTERNAL MEDICINE
Payer: COMMERCIAL

## 2024-06-21 LAB
ANION GAP SERPL CALCULATED.3IONS-SCNC: 15 MMOL/L (ref 7–15)
BUN SERPL-MCNC: 79.2 MG/DL (ref 8–23)
CALCIUM SERPL-MCNC: 8.3 MG/DL (ref 8.8–10.2)
CHLORIDE SERPL-SCNC: 100 MMOL/L (ref 98–107)
CREAT SERPL-MCNC: 4.91 MG/DL (ref 0.51–0.95)
DEPRECATED HCO3 PLAS-SCNC: 21 MMOL/L (ref 22–29)
EGFRCR SERPLBLD CKD-EPI 2021: 9 ML/MIN/1.73M2
ERYTHROCYTE [DISTWIDTH] IN BLOOD BY AUTOMATED COUNT: 13.8 % (ref 10–15)
GLUCOSE BLDC GLUCOMTR-MCNC: 178 MG/DL (ref 70–99)
GLUCOSE BLDC GLUCOMTR-MCNC: 207 MG/DL (ref 70–99)
GLUCOSE BLDC GLUCOMTR-MCNC: 308 MG/DL (ref 70–99)
GLUCOSE BLDC GLUCOMTR-MCNC: 97 MG/DL (ref 70–99)
GLUCOSE SERPL-MCNC: 148 MG/DL (ref 70–99)
HBV SURFACE AB SERPL IA-ACNC: <3.5 M[IU]/ML
HBV SURFACE AB SERPL IA-ACNC: NONREACTIVE M[IU]/ML
HBV SURFACE AG SERPL QL IA: NONREACTIVE
HCT VFR BLD AUTO: 30.1 % (ref 35–47)
HGB BLD-MCNC: 9.7 G/DL (ref 11.7–15.7)
MCH RBC QN AUTO: 27.6 PG (ref 26.5–33)
MCHC RBC AUTO-ENTMCNC: 32.2 G/DL (ref 31.5–36.5)
MCV RBC AUTO: 86 FL (ref 78–100)
PLATELET # BLD AUTO: 270 10E3/UL (ref 150–450)
POTASSIUM SERPL-SCNC: 3.9 MMOL/L (ref 3.4–5.3)
RBC # BLD AUTO: 3.51 10E6/UL (ref 3.8–5.2)
SODIUM SERPL-SCNC: 136 MMOL/L (ref 135–145)
WBC # BLD AUTO: 7.5 10E3/UL (ref 4–11)

## 2024-06-21 PROCEDURE — 85027 COMPLETE CBC AUTOMATED: CPT | Performed by: STUDENT IN AN ORGANIZED HEALTH CARE EDUCATION/TRAINING PROGRAM

## 2024-06-21 PROCEDURE — 250N000011 HC RX IP 250 OP 636: Mod: JZ | Performed by: RADIOLOGY

## 2024-06-21 PROCEDURE — 258N000003 HC RX IP 258 OP 636: Mod: JZ | Performed by: INTERNAL MEDICINE

## 2024-06-21 PROCEDURE — 250N000009 HC RX 250: Performed by: RADIOLOGY

## 2024-06-21 PROCEDURE — 80048 BASIC METABOLIC PNL TOTAL CA: CPT | Performed by: STUDENT IN AN ORGANIZED HEALTH CARE EDUCATION/TRAINING PROGRAM

## 2024-06-21 PROCEDURE — C1894 INTRO/SHEATH, NON-LASER: HCPCS

## 2024-06-21 PROCEDURE — 250N000011 HC RX IP 250 OP 636: Mod: JZ | Performed by: NURSE PRACTITIONER

## 2024-06-21 PROCEDURE — 99232 SBSQ HOSP IP/OBS MODERATE 35: CPT | Performed by: STUDENT IN AN ORGANIZED HEALTH CARE EDUCATION/TRAINING PROGRAM

## 2024-06-21 PROCEDURE — 250N000011 HC RX IP 250 OP 636: Mod: JZ | Performed by: INTERNAL MEDICINE

## 2024-06-21 PROCEDURE — 36415 COLL VENOUS BLD VENIPUNCTURE: CPT | Performed by: STUDENT IN AN ORGANIZED HEALTH CARE EDUCATION/TRAINING PROGRAM

## 2024-06-21 PROCEDURE — 120N000001 HC R&B MED SURG/OB

## 2024-06-21 PROCEDURE — 86706 HEP B SURFACE ANTIBODY: CPT | Performed by: INTERNAL MEDICINE

## 2024-06-21 PROCEDURE — 634N000001 HC RX 634: Mod: JZ | Performed by: INTERNAL MEDICINE

## 2024-06-21 PROCEDURE — 250N000013 HC RX MED GY IP 250 OP 250 PS 637: Performed by: STUDENT IN AN ORGANIZED HEALTH CARE EDUCATION/TRAINING PROGRAM

## 2024-06-21 PROCEDURE — 90935 HEMODIALYSIS ONE EVALUATION: CPT

## 2024-06-21 PROCEDURE — 5A1D70Z PERFORMANCE OF URINARY FILTRATION, INTERMITTENT, LESS THAN 6 HOURS PER DAY: ICD-10-PCS | Performed by: INTERNAL MEDICINE

## 2024-06-21 PROCEDURE — 90935 HEMODIALYSIS ONE EVALUATION: CPT | Performed by: INTERNAL MEDICINE

## 2024-06-21 PROCEDURE — 87340 HEPATITIS B SURFACE AG IA: CPT | Performed by: INTERNAL MEDICINE

## 2024-06-21 PROCEDURE — 02HV33Z INSERTION OF INFUSION DEVICE INTO SUPERIOR VENA CAVA, PERCUTANEOUS APPROACH: ICD-10-PCS | Performed by: RADIOLOGY

## 2024-06-21 PROCEDURE — C1769 GUIDE WIRE: HCPCS

## 2024-06-21 PROCEDURE — 250N000013 HC RX MED GY IP 250 OP 250 PS 637: Performed by: INTERNAL MEDICINE

## 2024-06-21 PROCEDURE — 99152 MOD SED SAME PHYS/QHP 5/>YRS: CPT

## 2024-06-21 PROCEDURE — C1752 CATH,HEMODIALYSIS,SHORT-TERM: HCPCS

## 2024-06-21 PROCEDURE — 0JH63XZ INSERTION OF TUNNELED VASCULAR ACCESS DEVICE INTO CHEST SUBCUTANEOUS TISSUE AND FASCIA, PERCUTANEOUS APPROACH: ICD-10-PCS | Performed by: RADIOLOGY

## 2024-06-21 RX ORDER — HEPARIN SODIUM 1000 [USP'U]/ML
1-5 INJECTION, SOLUTION INTRAVENOUS; SUBCUTANEOUS ONCE
Status: COMPLETED | OUTPATIENT
Start: 2024-06-21 | End: 2024-06-21

## 2024-06-21 RX ORDER — NALOXONE HYDROCHLORIDE 0.4 MG/ML
0.4 INJECTION, SOLUTION INTRAMUSCULAR; INTRAVENOUS; SUBCUTANEOUS
Status: DISCONTINUED | OUTPATIENT
Start: 2024-06-21 | End: 2024-06-21

## 2024-06-21 RX ORDER — FLUMAZENIL 0.1 MG/ML
0.2 INJECTION, SOLUTION INTRAVENOUS
Status: DISCONTINUED | OUTPATIENT
Start: 2024-06-21 | End: 2024-06-21

## 2024-06-21 RX ORDER — ONDANSETRON 2 MG/ML
4 INJECTION INTRAMUSCULAR; INTRAVENOUS
Status: DISCONTINUED | OUTPATIENT
Start: 2024-06-21 | End: 2024-06-21

## 2024-06-21 RX ORDER — SODIUM BICARBONATE 650 MG/1
1300 TABLET ORAL DAILY
Status: DISCONTINUED | OUTPATIENT
Start: 2024-06-22 | End: 2024-06-23 | Stop reason: HOSPADM

## 2024-06-21 RX ORDER — AMLODIPINE BESYLATE 10 MG/1
10 TABLET ORAL DAILY
Status: DISCONTINUED | OUTPATIENT
Start: 2024-06-21 | End: 2024-06-23 | Stop reason: HOSPADM

## 2024-06-21 RX ORDER — ALBUMIN (HUMAN) 12.5 G/50ML
50 SOLUTION INTRAVENOUS
Status: DISCONTINUED | OUTPATIENT
Start: 2024-06-21 | End: 2024-06-22

## 2024-06-21 RX ORDER — NALOXONE HYDROCHLORIDE 0.4 MG/ML
0.2 INJECTION, SOLUTION INTRAMUSCULAR; INTRAVENOUS; SUBCUTANEOUS
Status: DISCONTINUED | OUTPATIENT
Start: 2024-06-21 | End: 2024-06-21

## 2024-06-21 RX ORDER — ATORVASTATIN CALCIUM 40 MG/1
40 TABLET, FILM COATED ORAL DAILY
Status: DISCONTINUED | OUTPATIENT
Start: 2024-06-21 | End: 2024-06-23 | Stop reason: HOSPADM

## 2024-06-21 RX ORDER — FENTANYL CITRATE 50 UG/ML
25-50 INJECTION, SOLUTION INTRAMUSCULAR; INTRAVENOUS EVERY 5 MIN PRN
Status: DISCONTINUED | OUTPATIENT
Start: 2024-06-21 | End: 2024-06-21

## 2024-06-21 RX ORDER — CARBOXYMETHYLCELLULOSE SODIUM 5 MG/ML
2 SOLUTION/ DROPS OPHTHALMIC
Status: DISCONTINUED | OUTPATIENT
Start: 2024-06-21 | End: 2024-06-23 | Stop reason: HOSPADM

## 2024-06-21 RX ADMIN — LIDOCAINE HYDROCHLORIDE 19 ML: 10 INJECTION, SOLUTION EPIDURAL; INFILTRATION; INTRACAUDAL; PERINEURAL at 10:18

## 2024-06-21 RX ADMIN — CEFAZOLIN SODIUM 2 G: 2 INJECTION, SOLUTION INTRAVENOUS at 09:46

## 2024-06-21 RX ADMIN — HEPARIN SODIUM 1600 UNITS: 1000 INJECTION, SOLUTION INTRAVENOUS; SUBCUTANEOUS at 12:38

## 2024-06-21 RX ADMIN — ATORVASTATIN CALCIUM 40 MG: 40 TABLET, FILM COATED ORAL at 15:02

## 2024-06-21 RX ADMIN — HYDRALAZINE HYDROCHLORIDE 25 MG: 25 TABLET, FILM COATED ORAL at 22:02

## 2024-06-21 RX ADMIN — FENTANYL CITRATE 12.5 MCG: 50 INJECTION, SOLUTION INTRAMUSCULAR; INTRAVENOUS at 10:09

## 2024-06-21 RX ADMIN — AMLODIPINE BESYLATE 10 MG: 10 TABLET ORAL at 15:03

## 2024-06-21 RX ADMIN — CARBOXYMETHYLCELLULOSE SODIUM 2 DROP: 5 SOLUTION/ DROPS OPHTHALMIC at 19:00

## 2024-06-21 RX ADMIN — ACETAMINOPHEN 650 MG: 325 TABLET, FILM COATED ORAL at 22:02

## 2024-06-21 RX ADMIN — BUMETANIDE 2 MG: 0.25 INJECTION INTRAMUSCULAR; INTRAVENOUS at 00:18

## 2024-06-21 RX ADMIN — INSULIN ASPART 2 UNITS: 100 INJECTION, SOLUTION INTRAVENOUS; SUBCUTANEOUS at 18:27

## 2024-06-21 RX ADMIN — SODIUM CHLORIDE 250 ML: 9 INJECTION, SOLUTION INTRAVENOUS at 12:38

## 2024-06-21 RX ADMIN — HEPARIN SODIUM 6000 UNITS: 1000 INJECTION INTRAVENOUS; SUBCUTANEOUS at 10:18

## 2024-06-21 RX ADMIN — EPOETIN ALFA-EPBX 4000 UNITS: 4000 INJECTION, SOLUTION INTRAVENOUS; SUBCUTANEOUS at 12:36

## 2024-06-21 RX ADMIN — SODIUM CHLORIDE 300 ML: 9 INJECTION, SOLUTION INTRAVENOUS at 12:39

## 2024-06-21 RX ADMIN — Medication: at 12:38

## 2024-06-21 RX ADMIN — HYDRALAZINE HYDROCHLORIDE 25 MG: 25 TABLET, FILM COATED ORAL at 15:06

## 2024-06-21 RX ADMIN — LEVOTHYROXINE SODIUM 175 MCG: 125 TABLET ORAL at 06:42

## 2024-06-21 RX ADMIN — CALCITRIOL CAPSULES 0.25 MCG 0.25 MCG: 0.25 CAPSULE ORAL at 15:03

## 2024-06-21 RX ADMIN — MIDAZOLAM 0.5 MG: 1 INJECTION INTRAMUSCULAR; INTRAVENOUS at 10:09

## 2024-06-21 RX ADMIN — HEPARIN SODIUM 1600 UNITS: 1000 INJECTION, SOLUTION INTRAVENOUS; SUBCUTANEOUS at 12:37

## 2024-06-21 RX ADMIN — IRON SUCROSE 100 MG: 20 INJECTION, SOLUTION INTRAVENOUS at 12:35

## 2024-06-21 ASSESSMENT — ACTIVITIES OF DAILY LIVING (ADL)
ADLS_ACUITY_SCORE: 29
ADLS_ACUITY_SCORE: 23
ADLS_ACUITY_SCORE: 25
ADLS_ACUITY_SCORE: 29
ADLS_ACUITY_SCORE: 25
ADLS_ACUITY_SCORE: 29
ADLS_ACUITY_SCORE: 25
ADLS_ACUITY_SCORE: 29
ADLS_ACUITY_SCORE: 25
ADLS_ACUITY_SCORE: 29
ADLS_ACUITY_SCORE: 29
ADLS_ACUITY_SCORE: 23
ADLS_ACUITY_SCORE: 29
ADLS_ACUITY_SCORE: 29
ADLS_ACUITY_SCORE: 25
ADLS_ACUITY_SCORE: 29
ADLS_ACUITY_SCORE: 25

## 2024-06-21 NOTE — PLAN OF CARE
"  Problem: Adult Inpatient Plan of Care  Goal: Plan of Care Review  Description: The Plan of Care Review/Shift note should be completed every shift.  The Outcome Evaluation is a brief statement about your assessment that the patient is improving, declining, or no change.  This information will be displayed automatically on your shift  note.  Outcome: Progressing  Flowsheets (Taken 6/20/2024 0849)  Outcome Evaluation: continued on IV Bumex with great output.  NPO after midnight for dialysis catheter surgical procedure.  Plan of Care Reviewed With: patient  Overall Patient Progress: improving  Goal: Patient-Specific Goal (Individualized)  Description: You can add care plan individualizations to a care plan. Examples of Individualization might be:  \"Parent requests to be called daily at 9am for status\", \"I have a hard time hearing out of my right ear\", or \"Do not touch me to wake me up as it startles  me\".  Outcome: Progressing  Goal: Absence of Hospital-Acquired Illness or Injury  Outcome: Progressing  Goal: Optimal Comfort and Wellbeing  Outcome: Progressing  Goal: Readiness for Transition of Care  Outcome: Progressing     Problem: Gas Exchange Impaired  Goal: Optimal Gas Exchange  Outcome: Progressing     Problem: Fall Injury Risk  Goal: Absence of Fall and Fall-Related Injury  Outcome: Progressing     Problem: Comorbidity Management  Goal: Blood Glucose Levels Within Targeted Range  Outcome: Progressing  Goal: Blood Pressure in Desired Range  Outcome: Progressing     Problem: Heart Failure  Goal: Optimal Coping  Outcome: Progressing  Goal: Optimal Cardiac Output  Outcome: Progressing  Goal: Stable Heart Rate and Rhythm  Outcome: Progressing  Goal: Optimal Functional Ability  Outcome: Progressing  Goal: Fluid and Electrolyte Balance  Outcome: Progressing  Goal: Improved Oral Intake  Outcome: Progressing  Goal: Effective Oxygenation and Ventilation  Outcome: Progressing  Intervention: Promote Airway Secretion " Clearance  Recent Flowsheet Documentation  Taken 6/20/2024 1627 by Emilia Junior RN  Cough And Deep Breathing: done independently per patient  Goal: Effective Breathing Pattern During Sleep  Outcome: Progressing   Goal Outcome Evaluation:      Plan of Care Reviewed With: patient    Overall Patient Progress: improvingOverall Patient Progress: improving    Outcome Evaluation: continued on IV Bumex with great output.  NPO after midnight for dialysis catheter surgical procedure.

## 2024-06-21 NOTE — PROGRESS NOTES
Care Management Follow Up    Length of Stay (days): 2    Expected Discharge Date: 06/24/2024     Concerns to be Addressed: care coordination/care conferences, discharge planning       Anticipated Discharge Disposition: Dialysis Services  Anticipated Discharge Services: None  Anticipated Discharge DME: None    Patient/family educated on Medicare website which has current facility and service quality ratings:    Education Provided on the Discharge Plan: Yes  Patient/Family in Agreement with the Plan: yes     Referrals Placed by CM/SW:  (Dialysis)      Additional Information:  Received call from [facility] with update with the following info:     [facility] admissions coordinator: Aurelia ext: [phone]  Reference: [id number]    Addendum 1531:   RN CC following in collaboration with [facility] and Nephrology for initiation of new OP HD. Aurelia [facility] admissions coordinator confirmed they do not need any additional patient info and were able to schedule the patient's first appt with the following details. CM encouraged them to reach out to us if they need any additional info prior to patient's first OP HD. Per Aurelia the [facility] location is currently full, patient can get on a waitlist for them when a spot opens up, patient was informed.    Final confirmation plan for new OP HD is as follows:  Patient will be discharging to home and have outpatient dialysis at:  [facility]  First Outpatient run will be on Wednesday, 6/26/24. Patient should arrive at 1:45pm for the first run to complete paperwork.  For subsequent runs, chair time will be every MWF at 2:45pm. Arrival should be 15 min before chair time.  Transportation to and from dialysis will be provided by: patient    AVS discharge instructions and patient were updated on final plan of care. Hospitalist and nephrologist Dr. Monson were updated with first appt date/location as well.        Germaine Cuello, RN, BSN  Inpatient Care Coordination  [facility]  [facility]  [phone]

## 2024-06-21 NOTE — DISCHARGE INSTRUCTIONS
Caring for Your Tunneled Dialysis Catheter  ____________________________________________    Patient Name: Nisreen Mckenna  Today's Date: June 21, 2024    The radiologist who performed your procedure was:  Dr. Bob    When you get home:  No driving or drinking alcohol until tomorrow. You may still have side effects from the medicine you received today. (You may feel drowsy, unsteady or forgetful.)  You should have an adult with you for your first six hours at home.  You may go back to your regular diet today.   If you take aspirin or Plavix, you may begin taking it again tomorrow. You may restart all other medicines today. Use pain medicine as directed.  Avoid heavy lifting or the excess use of your shoulder for three days.  Keep the neck bandage clean and dry for three days. Do not shower unless it is covered with plastic.  After three days you may use a Band-Aid on your neck. Keep using Band-Aids until the wound has healed.    What is a tunneled dialysis catheter?   This is a special tube (catheter) that is threaded (tunneled) under your skin and then placed in a vein near your collarbone. Some people have it for a short time, others have it for their entire lives.    We will use this tube to access your bloodstream for dialysis. After each treatment, your dialysis team will flush the tube with a drug called heparin. This will keep the tube from clotting. They will then change your bandage.    What does it look like?   The catheter is a Y-shaped tube. The two ends, called ports, are each covered with a cap. A clamp near each port helps prevent infection, bleeding and air in the vein.   The exit site (where the tube comes out of your skin) stays covered with a clear bandage.    The entrance site, or tunneling site, is higher up on the neck. It is covered with small pieces of tape called Steri-Strips.     How should I care for the catheter?  Prevent injury and infection:  Infection may occur if germs enter your  bloodstream around the exit or entrance sites. To prevent an infection:  Do not swim while you have the catheter.   You may shower if you first cover the bandage with plastic. You may take a bath if the water stays below your waist. (Sponge bathe your upper body to keep the bandage from getting wet.)  Take your temperature by mouth daily. If you have a fever over 100 F (37.8 C), call your doctor.  Check the skin around the tube each day for redness, swelling, drainage or tenderness. These are all signs of infection.  You will need to avoid heavy activity. Avoid contact sports.    If you have oozing or bleeding from the catheter site  Put direct pressure on the wound for 5 to 10 minutes with a gauze pad.  If you still have bleeding after 10 minutes, call your doctor.  If you are bleeding a lot and can't control it with direct pressure, call 911.    Check your catheter each day  Make sure the clamps are closed over both ends of the tubing. Check that the caps are tight.  If a cap is loose or comes off, you may have bleeding from the tube, or air could get into the bloodstream. To prevent this, make sure the clamp on the catheter is closed. Wrap the end of the tube in a clean gauze and call your doctor or dialysis unit at once.  Your catheter is not likely to fall out. It is sewn into your skin with stitches. (Stitches are removed or will fall out in a couple weeks.) Also, a small cuff under the skin helps to hold the catheter in place. If the catheter does fall out, put firm pressure on the exit site with a clean gauze. Then, call your doctor or dialysis unit at once.     When should I call my doctor or dialysis unit?  Call right away if:  Your temperature under the tongue is over 100 F (37.8 C).  The skin around the tube feels tender or you see drainage.  You have trouble breathing.  You have unusual chest or shoulder pain.   A cap comes off.  The catheter falls out.    [facility]  Department:  [facility] at [phone]      If you are deaf or hard of hearing, please let us know. We provide many free services including sign language interpreters, oral interpreters, TTYs, telephone amplifiers, note takers and written materials    EMERGENCY CLAMP     EMERGENCY CLAMP                   You will get your outpatient dialysis at:  [facility] location  [address] [place] [address] [place] [address]    Your first outpatient run will be on Wednesday June 26, 2024.  Please arrive at 1:45pm  to complete paperwork.    For subsequent runs, your chair time will be every  MWF at 2:45pm.  You should plan to arrive 15 minutes prior to your chair time.    Please bring:   Two forms of ID  Insurance cards  Your CURRENT medication list  Wear a button up shirt  Bring something quiet to do  Bring a pillow or a small blanket because the room can be chilly.

## 2024-06-21 NOTE — IR NOTE
Interventional Radiology Pre-Procedure Sedation Assessment   Time of Assessment: 9:33 AM    Expected Level: Moderate Sedation    Indication: Sedation is required for the following type of Procedure: Venous Access    Sedation and procedural consent: Risks, benefits and alternatives were discussed with Patient    PO Intake: Appropriately NPO for procedure    ASA Class: Class 3 - SEVERE SYSTEMIC DISEASE, DEFINITE FUNCTIONAL LIMITATIONS.    Mallampati: Grade 3:  Soft palate visible, posterior pharyngeal wall not visible    Lungs: Lungs Clear with good breath sounds bilaterally    Heart:  Systolic murmur, normal rate and rhythm    History and physical reviewed and no updates needed. I have reviewed the lab findings, diagnostic data, medications, and the plan for sedation. I have determined this patient to be an appropriate candidate for the planned sedation and procedure and have reassessed the patient IMMEDIATELY PRIOR to sedation and procedure.    Marino Martel MD

## 2024-06-21 NOTE — PROGRESS NOTES
[facility]    Medicine Progress Note - Hospitalist Service    Date of Admission:  6/19/2024  Date of Service: 06/21/2024    Assessment & Plan       65 yo F with a history of HTN, CKD, TARA, HLD, hypothyroidism who presents hospital today for shortness of breath, weight gain, and leg swelling     Patient was most recently hospitalized 5/20/2024 through 5/22/2024 for worsening renal function and abnormal labs including hyperkalemia and acute on chronic renal failure.  The patient was seen by nephrology that admission, a follow-up clinic visit with nephrology was arranged on discharge.     ED: Presenting vital signs included systolic blood pressure near 180, heart rate near 60, no fever, and reported oxygen saturations of 84% on RA.  Patient currently on 7 L nasal cannula with saturations in the low 90% range     Pertinent labs notable for normal VBG, hemoglobin 9.5, creatinine near 4.8, BUN near 70, glucose near 330, BNP of 6700, and troponin of 110     Bedside echocardiogram performed by the ER provider appears to show bilateral pleural effusions and normal ejection fraction per his report.  Await formal radiology read     Chest x-ray ordered and pending     Patient is being admitted to the hospital service for acute hypoxic respiratory failure suspected to be due to volume overload in the setting of chronic kidney disease     1.  Acute hypoxic respiratory failure  -Suspect due to volume overload from progressive chronic kidney disease  -Recent TTE performed 5/20/2024 showed ejection fraction of 70% with borderline LVH and mild aortic stenosis  -The patient was just transitioned from Lasix 80 mg p.o. twice daily to Bumex 2 mg p.o. twice daily yesterday by her nephrologist  -CXR 06/19 -> Small bilateral pleural effusions. Engorged indistinct central pulmonary vasculature, correlate for interstitial pulmonary edema.   -Received 80 mg IV Lasix in the ER.    Plan:  -Will order Bumex 2 mg IV every 8  "hours for now  -Consulted nephrology -> Patient is on IR schedule 6/21/24 for a Tunneled dialysis catheter placement for HD     2.  Acute on chronic kidney disease.    - Baseline creatinine near 4 historically  -Has not required dialysis in the past.  She has discussed the possibility of peritoneal dialysis with her nephrologist  Plan:  - Follows with Dr. Link of nephrology  - Replace mag and phos as needed  - resume bicarbonate tabs TID  - resume daily Lokelma dose     3.  Diabetes mellitus  - recent hgb A1c 9.7 in 5/24  -Treat with insulin sliding scale  -Resume appropriate home diabetic medications      4.  Obstructive sleep apnea     5.  Hypertension. Resume PTA hydralazine / norvasc     6 hypothyroidism  -Check TSH for completeness -> 1.36. WNL             Diet: NPO per Anesthesia Guidelines for Procedure/Surgery Except for: Meds    DVT Prophylaxis: Pneumatic Compression Devices  Diaz Catheter: Not present  Lines: PRESENT      CVC Double Lumen Right Internal jugular Tunneled-Site Assessment: WDL    Cardiac Monitoring: ACTIVE order. Indication: Electrolyte Imbalance (24 hours)- Magnesium <1.3 mg/ml; Potassium < =2.8 or > 5.5 mg/ml  Code Status: Full Code      Clinically Significant Risk Factors                 # Acute Kidney Injury, unspecified: based on a >150% or 0.3 mg/dL increase in last creatinine compared to past 90 day average, will monitor renal function  # Hypertension: Noted on problem list               # DMII: A1C = 9.7 % (Ref range: <5.7 %) within past 6 months, PRESENT ON ADMISSION  # Obesity: Estimated body mass index is 33.16 kg/m  as calculated from the following:    Height as of this encounter: 1.575 m (5' 2\").    Weight as of this encounter: 82.2 kg (181 lb 4.8 oz)., PRESENT ON ADMISSION            Disposition Plan     Medically Ready for Discharge: Anticipated in 2-4 Days             Miguel Ángel Pedersen MD  Hospitalist Service  [facility]  Securely message with Horacio (more " info)  Text page via [facility] Paging/Directory   ______________________________________________________________________    Interval History     No acute events overnight  No CP/SOB  O2 needs now down to 3L NC  No new fevers  No nausea / vomiting   No new complaints, overall SOB is better per the patient    Physical Exam   Vital Signs: Temp: 98.7  F (37.1  C) Temp src: Oral BP: (!) 159/73 Pulse: 55   Resp: 17 SpO2: 100 % O2 Device: Nasal cannula Oxygen Delivery: 3 LPM  Weight: 181 lbs 4.8 oz    GEN: no apparent distress  PSYCH: pleasant, oriented, No acute distress.  HEART:  Normal S1, S2 with no edema.  LUNGS:  some crackles at bases otherwise clear to auscultation, normal Respiratory effort.  ABDOMEN:  Soft, no hepatosplenomegaly, normal bowel sounds.  SKIN:  Dry to touch, No rash.   ----------------------------------------------------------------------------------------    Medical Decision Making       35 MINUTES SPENT BY ME on the date of service doing chart review, history, exam, documentation & further activities per the note.      Data   ------------------------- PAST 24 HR DATA REVIEWED -----------------------------------------------    I have personally reviewed the following data over the past 24 hrs:    7.5  \   9.7 (L)   / 270     136 100 79.2 (H) /  97   3.9 21 (L) 4.91 (H) \       Imaging results reviewed over the past 24 hrs:   Recent Results (from the past 24 hour(s))   IR CVC Tunnel Placement > 5 Yrs of Age    Narrative    TUNNELED CATHETER PLACEMENT    INDICATION: Chronic intravenous access. 64-year-old woman with renal  failure, request made for tunneled dialysis catheter for initiation of  hemodialysis.    Location: Right internal jugular vein.    PROCEDURE:   Ultrasound guidance: Ultrasound was used to localize and document  patency of the internal jugular vein. A permanent image of the vein  was recorded.     Maximal Sterile Barrier Technique Utilized: Cap AND mask AND sterile  gown AND sterile  gloves AND sterile full body drape AND hand hygiene  AND skin preparation 2% chlorhexidine for cutaneous antisepsis (or  acceptable alternative antiseptics). Sterile Ultrasound Technique  Utilized ?Sterile gel AND sterile probe covers.    Local anesthesia was administered to the skin over the vein and a 4 mm  incision was made. Ultrasound was used to guide placement of a 5F  dilator in the vein using standard technique.     Lidocaine was then administered in the subcutaneous tissue over the  clavicle to a point about 5 cm below the mid clavicle. A short  incision was made at this point. A 19 cm catheter was then brought  through the tract between the two incisions and inserted into the  jugular vein through a peel away sheath. The catheter was secured in  the subclavian region with two interrupted stitches of 2-0  polypropylene. The neck incision was closed with Dermabond adhesive.     Complications: None  Sedation: A moderate level of sedation was achieved with 0.5 mg  midazolam.          12.5 mcg fentanyl.   Sedation time: 15 minutes.  Vital signs and sedation monitored by our nursing staff under my  supervision.   Fluoroscopy time: 0.2 minutes.  Air Kerma: 3 mGy  Contrast given: None  Local anesthetic: 19 mL of 1% lidocaine    FINDINGS: Fluoroscopic guidance with a permanent image confirmed the  catheter tip location in the right atrial/SVC junction, confirmed with  single spot fluoroscopic image.      Impression    IMPRESSION: Successful placement of right internal jugular 19 cm  palindrome hemodialysis catheter. The catheter is ready for immediate  use.    VIJAY DUNN MD         SYSTEM ID:  [id number]     ------------------------- ENCOUNTER LABS ----------------------------------------------------------------  Recent Labs   Lab 06/21/24  1213 06/21/24  0709 06/21/24  0221 06/20/24  0807 06/20/24  0720 06/19/24  1847 06/19/24  1444   WBC  --  7.5  --   --  8.1  --  7.5   HGB  --  9.7*  --   --  9.2*  --  9.5*    MCV  --  86  --   --  86  --  85   PLT  --  270  --   --  257  --  261   NA  --  136  --   --  136  --  135   POTASSIUM  --  3.9  --   --  3.8  --  3.8   CHLORIDE  --  100  --   --  102  --  99   CO2  --  21*  --   --  20*  --  21*   BUN  --  79.2*  --   --  70.8*  --  69.8*   CR  --  4.91*  --   --  4.65*  --  4.75*   ANIONGAP  --  15  --   --  14  --  15   BETH  --  8.3*  --   --  8.0*  --  8.2*   GLC 97 148* 178*   < > 167*   < > 326*    < > = values in this interval not displayed.       Most Recent 3 CBC's:  Recent Labs   Lab Test 06/21/24  0709 06/20/24  0720 06/19/24  1444   WBC 7.5 8.1 7.5   HGB 9.7* 9.2* 9.5*   MCV 86 86 85    257 261     Most Recent 3 BMP's:  Recent Labs   Lab Test 06/21/24  1213 06/21/24  0709 06/21/24  0221 06/20/24  0807 06/20/24  0720 06/19/24  1847 06/19/24  1444   NA  --  136  --   --  136  --  135   POTASSIUM  --  3.9  --   --  3.8  --  3.8   CHLORIDE  --  100  --   --  102  --  99   CO2  --  21*  --   --  20*  --  21*   BUN  --  79.2*  --   --  70.8*  --  69.8*   CR  --  4.91*  --   --  4.65*  --  4.75*   ANIONGAP  --  15  --   --  14  --  15   BETH  --  8.3*  --   --  8.0*  --  8.2*   GLC 97 148* 178*   < > 167*   < > 326*    < > = values in this interval not displayed.     Most Recent 2 LFT's:No lab results found.  Most Recent 3 INR's:No lab results found.  Most Recent 3 Troponin's:No lab results found.  Most Recent 3 BNP's:  Recent Labs   Lab Test 06/19/24  1444 05/20/24  1309   NTBNPI 6,777* 2,332*     Most Recent D-dimer:No lab results found.  Most Recent Cholesterol Panel:No lab results found.  Most Recent 6 Bacteria Isolates From Any Culture (See EPIC Reports for Culture Details):No lab results found.  Most Recent TSH and T4:  Recent Labs   Lab Test 06/19/24  1658   TSH 1.36     Most Recent Urinalysis:  Recent Labs   Lab Test 05/20/24  1240   COLOR Straw   APPEARANCE Clear   URINEGLC 30*   URINEBILI Negative   URINEKETONE Negative   SG 1.010   UBLD Trace*   URINEPH 5.5    PROTEIN 300*   NITRITE Negative   LEUKEST Negative   RBCU <1   WBCU 2     Most Recent ESR & CRP:No lab results found.

## 2024-06-21 NOTE — PROGRESS NOTES
DIALYSIS PROCEDURE NOTE      Patient dialyzed for 3 hrs. on a K3 bath with a net fluid removal of  1.9L.  A BFR of 350 ml/min was obtained via a CVC   The treatment plan was discussed with Dr. jin during the treatment.    Total heparin received during the treatment: 0 units.   Line flushed, clamped and capped with heparin 1:1000 1.6 mL (1600 units) per lumen    Meds  given: epo, iron   Complications: switched lines, had some minor cramping in the legs gave 100 ml of NS.    Person educated: patient. Barriers to learning: none. Educated on procedure via verbal mode. patient verbalized understanding.     ICEBOAT    I: Patient was identified using 2 identifiers  C:  Consent Signed Yes  E: Equipment preventative maintenance is current and dialysis delivery system OK to use  B: Hepatitis B Surface result    Latest Reference Range & Units 05/21/24 07:46 06/20/24 07:20   Hep B Surface Agn Nonreactive  Nonreactive Nonreactive   Hepatitis B Surface Antibody Instrument Value <8.5 m[IU]/mL <3.50      Hep antibody drawn  O: Dialysis orders present and complete prior to treatment  A: Vascular access verified and assessed prior to treatment  T: Treatment was performed at a clinically appropriate time  ?: Patient was allowed to ask questions and address concerns prior to treatment  Machine water alarm in place and functioning. Transducer pods intact and checked every 15min.   Pt returned via stretcher.  Chlorine/Chloramine water system checked every 4 hours.    Patient repositioned every 4 hours during the treatment.  Post treatment report given

## 2024-06-21 NOTE — PROGRESS NOTES
Assessment and Plan:     End-stage renal disease: She is seen for her first dialysis treatment after placement of a right tunneled dialysis catheter.  Today we will plan a 3-hour run using 350 blood flow rate through her right internal jugular tunneled dialysis catheter.  Will aim for 1.5-2.0 L of ultrafiltration.  She will be on a 3K, 38 bicarb, 140 sodium bath.  She will get EPO 4000 units on the run.  Will not use heparin on the run today given her recent procedure.    She underwent vigorous diuresis over the last 24 hours with chlorothiazide and Bumex with good response.  These will not be stopped now that she was on dialysis and we can discontinue her on some Bumex orally to maintain what ever urine output she has.    She will continue on vitamin D and oral calcitriol.    She will have a second dialysis treatment tomorrow after which she can be discharged.  The care coordinator will arrange outpatient dialysis at the [facility] dialysis unit.            Interval History:   Anemia: She is on EPO with dialysis and oral iron.  As an outpatient she will get intravenous Venofer with her treatments.          She has diabetes, hypertension, obstructive sleep apnea and hypothyroidism.               Review of Systems:   She tolerated her procedure well and there were no complications.  She is currently feels well on dialysis.          Medications:     Current Facility-Administered Medications   Medication Dose Route Frequency Provider Last Rate Last Admin    - MEDICATION INSTRUCTIONS for Dialysis Patients -   Does not apply See Admin Instructions Miguel Ángel Pedersen MD        calcitRIOL (ROCALTROL) capsule 0.25 mcg  0.25 mcg Oral Daily Michael Glynn MD   0.25 mcg at 06/20/24 0902    epoetin louie-epbx (RETACRIT) injection 4,000 Units  4,000 Units Intravenous Once in dialysis/CRRT Priyank Monson MD        sodium chloride 0.9% DIALYSIS Cath LOCK - RED Lumen  10 mL Intracatheter Once in  dialysis/CRRT Priyank Monson MD        Followed by    heparin 1000 unit/mL DIALYSIS Cath LOCK - RED Lumen  1.3-2.6 mL Intracatheter Once in dialysis/CRRT Priyank Monson MD        sodium chloride 0.9% DIALYSIS Cath LOCK - BLUE Lumen  10 mL Intracatheter Once in dialysis/CRRT Priyank Monson MD        Followed by    heparin 1000 unit/mL DIALYSIS Cath LOCK -BLUE Lumen  1.3-2.6 mL Intracatheter Once in dialysis/CRRT Priyank Monson MD        hydrALAZINE (APRESOLINE) tablet 25 mg  25 mg Oral TID Michael Glynn MD   25 mg at 06/20/24 2211    insulin aspart (NovoLOG) injection (RAPID ACTING)  1-7 Units Subcutaneous TID AC Michael Glynn MD   1 Units at 06/20/24 2038    insulin aspart (NovoLOG) injection (RAPID ACTING)  1-5 Units Subcutaneous At Bedtime Michael Glynn MD   3 Units at 06/20/24 2211    iron sucrose (VENOFER) injection 100 mg  100 mg Intravenous Once in dialysis/CRRT Priyank Monson MD        levothyroxine (SYNTHROID/LEVOTHROID) tablet 175 mcg  175 mcg Oral Daily Michael Gylnn MD   175 mcg at 06/21/24 0642    No heparin via hemodialysis machine   Does not apply Once Priyank Monson MD        [START ON 6/22/2024] sodium bicarbonate tablet 1,300 mg  1,300 mg Oral Daily Priyank Monson MD        sodium chloride (PF) 0.9% PF flush 3 mL  3 mL Intracatheter Q8H Michael Glynn MD   3 mL at 06/21/24 0025    sodium chloride (PF) 0.9% PF flush 9 mL  9 mL Intracatheter During Dialysis/CRRT (from stock) Priyank Monson MD        sodium chloride (PF) 0.9% PF flush 9 mL  9 mL Intracatheter During Dialysis/CRRT (from stock) Priyank Monson MD        sodium chloride 0.9% BOLUS 250 mL  250 mL Intravenous Once in dialysis/CRRT Priyank Monson MD        sodium chloride 0.9% BOLUS 300 mL  300 mL Hemodialysis Machine Once Priyank Monson MD        vitamin D2 (ERGOCALCIFEROL) 07018 units (1250 mcg) capsule  1,250 mcg  1,250 mcg Oral Q7 Days Miguel Ángel Pedersen MD   1,250 mcg at 24     Current Facility-Administered Medications   Medication Dose Route Frequency Provider Last Rate Last Admin    medication instruction   Does not apply Continuous Renetta Cui APRN CNP         Current active medications and PTA medications reviewed, see medication list for details.            Physical Exam:   Vitals were reviewed  Patient Vitals for the past 24 hrs:   BP Temp Temp src Pulse Resp SpO2 Weight   24 1042 (!) 183/74 -- -- 56 18 99 % --   24 1020 (!) 159/79 -- -- 60 21 97 % --   24 1013 (!) 160/82 -- -- 58 22 98 % --   24 1010 (!) 163/85 -- -- 58 22 96 % --   24 1004 (!) 175/80 -- -- 61 20 96 % --   24 0958 (!) 180/79 -- -- 56 20 99 % --   24 0953 (!) 186/82 -- -- 58 16 99 % --   24 0947 (!) 178/79 -- -- 56 22 97 % --   24 0822 (!) 162/64 98.5  F (36.9  C) Oral 59 19 97 % --   24 0521 -- -- -- -- -- -- 82.2 kg (181 lb 4.8 oz)   24 2347 (!) 155/55 98.2  F (36.8  C) Oral 64 18 96 % --   24 2209 (!) 155/72 -- -- 63 -- 98 % --   24 1631 (!) 169/54 98  F (36.7  C) Oral 56 -- 97 % --   24 1519 -- -- -- -- -- 97 % --   24 1512 -- -- -- -- -- 96 % --   24 1510 -- -- -- -- -- 98 % --       Temp:  [98  F (36.7  C)-98.5  F (36.9  C)] 98.5  F (36.9  C)  Pulse:  [56-64] 56  Resp:  [16-22] 18  BP: (155-186)/(54-85) 183/74  SpO2:  [96 %-99 %] 99 %    Temperatures:  Current - Temp: 98.5  F (36.9  C); Max - Temp  Av.2  F (36.8  C)  Min: 98  F (36.7  C)  Max: 98.5  F (36.9  C)  Respiration range: Resp  Av.8  Min: 16  Max: 22  Pulse range: Pulse  Av.8  Min: 56  Max: 64  Blood pressure range: Systolic (24hrs), Av , Min:155 , Max:186   ; Diastolic (24hrs), Av, Min:54, Max:85    Pulse oximetry range: SpO2  Av.3 %  Min: 96 %  Max: 99 %    I/O last 3 completed shifts:  In: 492 [P.O.:480; I.V.:12]  Out: 7795  "[Urine:3475]      Intake/Output Summary (Last 24 hours) at 6/21/2024 1053  Last data filed at 6/21/2024 0528  Gross per 24 hour   Intake 246 ml   Output 3175 ml   Net -2929 ml       She is alert and responsive  She has a right tunneled dialysis catheter with no hematoma or bleeding evident  Lungs show clear breath sounds  Cardiac exam shows a regular rhythm, there is a 2/6 systolic murmur, there is no rub  Lower extremities show 2+ edema       Wt Readings from Last 4 Encounters:   06/21/24 82.2 kg (181 lb 4.8 oz)   05/22/24 79.5 kg (175 lb 4.8 oz)          Data:          Lab Results   Component Value Date     06/21/2024     06/20/2024     06/19/2024    Lab Results   Component Value Date    CHLORIDE 100 06/21/2024    CHLORIDE 102 06/20/2024    CHLORIDE 99 06/19/2024    Lab Results   Component Value Date    BUN 79.2 06/21/2024    BUN 70.8 06/20/2024    BUN 69.8 06/19/2024      Lab Results   Component Value Date    POTASSIUM 3.9 06/21/2024    POTASSIUM 3.8 06/20/2024    POTASSIUM 3.8 06/19/2024    Lab Results   Component Value Date    CO2 21 06/21/2024    CO2 20 06/20/2024    CO2 21 06/19/2024    Lab Results   Component Value Date    CR 4.91 06/21/2024    CR 4.65 06/20/2024    CR 4.75 06/19/2024        Recent Labs   Lab Test 06/21/24  0709 06/20/24  0720 06/19/24  1444   WBC 7.5 8.1 7.5   HGB 9.7* 9.2* 9.5*   HCT 30.1* 28.7* 28.9*   MCV 86 86 85    257 261     No results for input(s): \"AST\", \"ALT\", \"GGT\", \"ALKPHOS\", \"BILITOTAL\", \"BILICONJ\", \"BILIDIRECT\", \"FRACISCO\" in the last 54349 hours.    Invalid input(s): \"BILIRUBININDIRECT\"    Recent Labs   Lab Test 06/19/24  1658   MAG 2.1     Recent Labs   Lab Test 06/19/24  1658   PHOS 5.1*     Recent Labs   Lab Test 06/21/24  0709 06/20/24  0720 06/19/24  1444   BETH 8.3* 8.0* 8.2*       Lab Results   Component Value Date    BETH 8.3 (L) 06/21/2024     Lab Results   Component Value Date    WBC 7.5 06/21/2024    HGB 9.7 (L) 06/21/2024    HCT 30.1 (L) " 06/21/2024    MCV 86 06/21/2024     06/21/2024     Lab Results   Component Value Date     06/21/2024    POTASSIUM 3.9 06/21/2024    CHLORIDE 100 06/21/2024    CO2 21 (L) 06/21/2024     (H) 06/21/2024     Lab Results   Component Value Date    BUN 79.2 (H) 06/21/2024    CR 4.91 (H) 06/21/2024     Lab Results   Component Value Date    MAG 2.1 06/19/2024     Lab Results   Component Value Date    PHOS 5.1 (H) 06/19/2024       Creatinine   Date Value Ref Range Status   06/21/2024 4.91 (H) 0.51 - 0.95 mg/dL Final   06/20/2024 4.65 (H) 0.51 - 0.95 mg/dL Final   06/19/2024 4.75 (H) 0.51 - 0.95 mg/dL Final   05/22/2024 4.09 (H) 0.51 - 0.95 mg/dL Final   05/21/2024 4.05 (H) 0.51 - 0.95 mg/dL Final   05/20/2024 3.62 (H) 0.51 - 0.95 mg/dL Final       Attestation:  I have reviewed today's vital signs, notes, medications, labs and imaging.  Seen on dialysis.     Priyank Monson MD

## 2024-06-21 NOTE — SEDATION DOCUMENTATION
Post Procedure Summary:  Prior to the start of the procedure and with procedural staff participation, I verbally confirmed the patient s identity using two indicators, relevant allergies, that the procedure was appropriate and matched the consent or emergent situation, and that the correct equipment/implants were available. Immediately prior to starting the procedure I conducted the Time Out with the procedural staff and re-confirmed the patient s name, procedure, and site/side. (The Joint Commission universal protocol was followed.)  Yes                                        Sedatives: Fentanyl and Midazolam (Versed)     Vital signs, airway and pulse oximetry were monitored and remained stable throughout the procedure and sedation was maintained until the procedure was complete.  The patient was monitored by staff until sedation discharge criteria were met.     Patient tolerance: Patient tolerated the procedure well with no immediate complications.     Time of sedation in minutes: 15 Minutes minutes from beginning to end of physician one to one monitoring.    Patient denies pain, nausea and shortness of breath.  Transferred to bed 351 via cart accompanied by RN.  Report provided to receiving nurse, GRECIA Hall.

## 2024-06-21 NOTE — PLAN OF CARE
"A&O x4. SBA w/ GB. On tele, in SR w/ BBB and first degree AV block. On 3 L NC, no O2 at baseline. On IV bumex q8hr. Plan to have tunneled dialysis cath placed today and have a round of dialysis after catheter placement.     Goal Outcome Evaluation:      Plan of Care Reviewed With: patient    Overall Patient Progress: improvingOverall Patient Progress: improving    Outcome Evaluation: On 3 L NC, sating well. B. BP: 155/55. No SOB reported at rest, but does have GUEVARA.      Problem: Adult Inpatient Plan of Care  Goal: Plan of Care Review  Description: The Plan of Care Review/Shift note should be completed every shift.  The Outcome Evaluation is a brief statement about your assessment that the patient is improving, declining, or no change.  This information will be displayed automatically on your shift  note.  Outcome: Progressing  Flowsheets (Taken 2024 0356)  Outcome Evaluation: On 3 L NC, sating well. B. BP: 155/55. No SOB reported at rest, but does have GUEVARA.  Plan of Care Reviewed With: patient  Overall Patient Progress: improving  Goal: Patient-Specific Goal (Individualized)  Description: You can add care plan individualizations to a care plan. Examples of Individualization might be:  \"Parent requests to be called daily at 9am for status\", \"I have a hard time hearing out of my right ear\", or \"Do not touch me to wake me up as it startles  me\".  Outcome: Progressing  Goal: Absence of Hospital-Acquired Illness or Injury  Outcome: Progressing  Intervention: Identify and Manage Fall Risk  Recent Flowsheet Documentation  Taken 2024 0234 by Vannesa Ch, RN  Safety Promotion/Fall Prevention: safety round/check completed  Taken 2024 0035 by Vannesa Ch, RN  Safety Promotion/Fall Prevention: safety round/check completed  Taken 2024 0017 by Vannesa Ch, RN  Safety Promotion/Fall Prevention: safety round/check completed  Intervention: Prevent Skin Injury  Recent Flowsheet Documentation  Taken " 6/21/2024 0017 by Vannesa Ch RN  Body Position: position changed independently  Goal: Optimal Comfort and Wellbeing  Outcome: Progressing  Intervention: Monitor Pain and Promote Comfort  Recent Flowsheet Documentation  Taken 6/20/2024 2335 by Vannesa Ch RN  Pain Management Interventions: rest  Goal: Readiness for Transition of Care  Outcome: Progressing     Problem: Gas Exchange Impaired  Goal: Optimal Gas Exchange  Outcome: Progressing  Intervention: Optimize Oxygenation and Ventilation  Recent Flowsheet Documentation  Taken 6/21/2024 0017 by Vannesa Ch RN  Head of Bed (HOB) Positioning: HOB at 20-30 degrees     Problem: Fall Injury Risk  Goal: Absence of Fall and Fall-Related Injury  Outcome: Progressing  Intervention: Identify and Manage Contributors  Recent Flowsheet Documentation  Taken 6/21/2024 0017 by Vannesa Ch RN  Medication Review/Management: medications reviewed  Intervention: Promote Injury-Free Environment  Recent Flowsheet Documentation  Taken 6/21/2024 0234 by Vannesa Ch RN  Safety Promotion/Fall Prevention: safety round/check completed  Taken 6/21/2024 0035 by Vannesa Ch RN  Safety Promotion/Fall Prevention: safety round/check completed  Taken 6/21/2024 0017 by Vannesa Ch RN  Safety Promotion/Fall Prevention: safety round/check completed     Problem: Comorbidity Management  Goal: Blood Glucose Levels Within Targeted Range  Outcome: Progressing  Intervention: Monitor and Manage Glycemia  Recent Flowsheet Documentation  Taken 6/21/2024 0017 by Vannesa Ch RN  Medication Review/Management: medications reviewed  Goal: Blood Pressure in Desired Range  Outcome: Progressing  Intervention: Maintain Blood Pressure Management  Recent Flowsheet Documentation  Taken 6/21/2024 0017 by Vannesa Ch RN  Medication Review/Management: medications reviewed     Problem: Heart Failure  Goal: Optimal Coping  Outcome: Progressing  Intervention: Support Psychosocial Response  Recent Flowsheet  Documentation  Taken 6/21/2024 0017 by Vannesa Ch RN  Supportive Measures:   active listening utilized   relaxation techniques promoted  Goal: Optimal Cardiac Output  Outcome: Progressing  Intervention: Optimize Cardiac Output  Recent Flowsheet Documentation  Taken 6/21/2024 0017 by Vannesa Ch RN  Environmental Support: calm environment promoted  Goal: Stable Heart Rate and Rhythm  Outcome: Progressing  Goal: Optimal Functional Ability  Outcome: Progressing  Intervention: Optimize Functional Ability  Recent Flowsheet Documentation  Taken 6/21/2024 0017 by Vannesa Ch RN  Activity Management: back to bed  Taken 6/21/2024 0013 by Vannesa Ch RN  Activity Management: ambulated to bathroom  Goal: Fluid and Electrolyte Balance  Outcome: Progressing  Goal: Improved Oral Intake  Outcome: Progressing  Goal: Effective Oxygenation and Ventilation  Outcome: Progressing  Intervention: Promote Airway Secretion Clearance  Recent Flowsheet Documentation  Taken 6/21/2024 0017 by Vannesa Ch RN  Cough And Deep Breathing: done independently per patient  Activity Management: back to bed  Taken 6/21/2024 0013 by Vannesa Ch RN  Activity Management: ambulated to bathroom  Intervention: Optimize Oxygenation and Ventilation  Recent Flowsheet Documentation  Taken 6/21/2024 0017 by Vannesa Ch RN  Head of Bed (HOB) Positioning: HOB at 20-30 degrees  Goal: Effective Breathing Pattern During Sleep  Outcome: Progressing  Intervention: Monitor and Manage Obstructive Sleep Apnea  Recent Flowsheet Documentation  Taken 6/21/2024 0017 by Vannesa Ch RN  Medication Review/Management: medications reviewed

## 2024-06-21 NOTE — PLAN OF CARE
"Goal Outcome Evaluation:      Plan of Care Reviewed With: patient    Overall Patient Progress: improving  Outcome Evaluation: Tunnel cath placed w/1st dialysis run this morning. Tolerated well. BP elevated 170s this afternoon. 150's systolic this evening.  GI/: + BM. Pt voided after dialysis. Tunnel cath dressing intact. Plan to run HD again tomorrow. SW following for chair time/place as outpt.  Cardiac/Resp: SR 1*AVB. BPs elevated, meds changed. Lungs clear. O2  3L NC. Bumex discontinued. BLE edema. Lungs diminished.       Problem: Adult Inpatient Plan of Care  Goal: Plan of Care Review  Description: The Plan of Care Review/Shift note should be completed every shift.  The Outcome Evaluation is a brief statement about your assessment that the patient is improving, declining, or no change.  This information will be displayed automatically on your shift  note.  Outcome: Progressing  Flowsheets (Taken 6/21/2024 4643)  Outcome Evaluation: Tunnel cath placed w/1st dialysis run this morning. Tolerated well. BP elevated 170s this afternoon.  Plan of Care Reviewed With: patient  Overall Patient Progress: improving  Goal: Patient-Specific Goal (Individualized)  Description: You can add care plan individualizations to a care plan. Examples of Individualization might be:  \"Parent requests to be called daily at 9am for status\", \"I have a hard time hearing out of my right ear\", or \"Do not touch me to wake me up as it startles  me\".  Outcome: Progressing  Goal: Absence of Hospital-Acquired Illness or Injury  Outcome: Progressing  Intervention: Identify and Manage Fall Risk  Recent Flowsheet Documentation  Taken 6/21/2024 0810 by Isabel Raymundo, RN  Safety Promotion/Fall Prevention: safety round/check completed  Intervention: Prevent Skin Injury  Recent Flowsheet Documentation  Taken 6/21/2024 0810 by Isabel Raymundo, RN  Body Position: position changed independently  Intervention: Prevent and Manage VTE (Venous Thromboembolism) " Risk  Recent Flowsheet Documentation  Taken 6/21/2024 0810 by Isabel Raymundo, RN  VTE Prevention/Management: SCDs (sequential compression devices) off  Goal: Optimal Comfort and Wellbeing  Outcome: Progressing  Goal: Readiness for Transition of Care  Outcome: Progressing

## 2024-06-22 LAB
GLUCOSE BLDC GLUCOMTR-MCNC: 154 MG/DL (ref 70–99)
GLUCOSE BLDC GLUCOMTR-MCNC: 177 MG/DL (ref 70–99)
GLUCOSE BLDC GLUCOMTR-MCNC: 302 MG/DL (ref 70–99)
GLUCOSE BLDC GLUCOMTR-MCNC: 97 MG/DL (ref 70–99)

## 2024-06-22 PROCEDURE — 250N000013 HC RX MED GY IP 250 OP 250 PS 637: Performed by: STUDENT IN AN ORGANIZED HEALTH CARE EDUCATION/TRAINING PROGRAM

## 2024-06-22 PROCEDURE — 99232 SBSQ HOSP IP/OBS MODERATE 35: CPT | Performed by: HOSPITALIST

## 2024-06-22 PROCEDURE — 90935 HEMODIALYSIS ONE EVALUATION: CPT

## 2024-06-22 PROCEDURE — 258N000003 HC RX IP 258 OP 636: Mod: JZ | Performed by: INTERNAL MEDICINE

## 2024-06-22 PROCEDURE — 250N000013 HC RX MED GY IP 250 OP 250 PS 637: Performed by: INTERNAL MEDICINE

## 2024-06-22 PROCEDURE — 120N000001 HC R&B MED SURG/OB

## 2024-06-22 PROCEDURE — 90935 HEMODIALYSIS ONE EVALUATION: CPT | Performed by: INTERNAL MEDICINE

## 2024-06-22 PROCEDURE — 634N000001 HC RX 634: Mod: JZ | Performed by: INTERNAL MEDICINE

## 2024-06-22 PROCEDURE — 250N000011 HC RX IP 250 OP 636: Performed by: INTERNAL MEDICINE

## 2024-06-22 RX ORDER — ALBUMIN (HUMAN) 12.5 G/50ML
50 SOLUTION INTRAVENOUS
Status: DISCONTINUED | OUTPATIENT
Start: 2024-06-22 | End: 2024-06-22

## 2024-06-22 RX ORDER — HEPARIN SODIUM 1000 [USP'U]/ML
500 INJECTION, SOLUTION INTRAVENOUS; SUBCUTANEOUS CONTINUOUS
Status: DISCONTINUED | OUTPATIENT
Start: 2024-06-22 | End: 2024-06-22

## 2024-06-22 RX ADMIN — LEVOTHYROXINE SODIUM 175 MCG: 125 TABLET ORAL at 07:56

## 2024-06-22 RX ADMIN — AMLODIPINE BESYLATE 10 MG: 10 TABLET ORAL at 12:35

## 2024-06-22 RX ADMIN — HEPARIN SODIUM 1600 UNITS: 1000 INJECTION INTRAVENOUS; SUBCUTANEOUS at 10:34

## 2024-06-22 RX ADMIN — SODIUM CHLORIDE 300 ML: 9 INJECTION, SOLUTION INTRAVENOUS at 10:28

## 2024-06-22 RX ADMIN — ACETAMINOPHEN 650 MG: 325 TABLET, FILM COATED ORAL at 16:56

## 2024-06-22 RX ADMIN — CALCITRIOL CAPSULES 0.25 MCG 0.25 MCG: 0.25 CAPSULE ORAL at 12:35

## 2024-06-22 RX ADMIN — HYDRALAZINE HYDROCHLORIDE 25 MG: 25 TABLET, FILM COATED ORAL at 21:27

## 2024-06-22 RX ADMIN — HEPARIN SODIUM 500 UNITS: 1000 INJECTION INTRAVENOUS; SUBCUTANEOUS at 10:32

## 2024-06-22 RX ADMIN — INSULIN ASPART 1 UNITS: 100 INJECTION, SOLUTION INTRAVENOUS; SUBCUTANEOUS at 17:37

## 2024-06-22 RX ADMIN — ACETAMINOPHEN 650 MG: 325 TABLET, FILM COATED ORAL at 02:52

## 2024-06-22 RX ADMIN — IRON SUCROSE 100 MG: 20 INJECTION, SOLUTION INTRAVENOUS at 10:31

## 2024-06-22 RX ADMIN — ACETAMINOPHEN 650 MG: 325 TABLET, FILM COATED ORAL at 12:45

## 2024-06-22 RX ADMIN — EPOETIN ALFA-EPBX 4000 UNITS: 4000 INJECTION, SOLUTION INTRAVENOUS; SUBCUTANEOUS at 10:31

## 2024-06-22 RX ADMIN — HEPARIN SODIUM 500 UNITS/HR: 1000 INJECTION INTRAVENOUS; SUBCUTANEOUS at 10:33

## 2024-06-22 RX ADMIN — HEPARIN SODIUM 1600 UNITS: 1000 INJECTION INTRAVENOUS; SUBCUTANEOUS at 10:33

## 2024-06-22 RX ADMIN — SODIUM CHLORIDE 250 ML: 9 INJECTION, SOLUTION INTRAVENOUS at 10:28

## 2024-06-22 RX ADMIN — ACETAMINOPHEN 650 MG: 325 TABLET, FILM COATED ORAL at 08:07

## 2024-06-22 RX ADMIN — ACETAMINOPHEN 650 MG: 325 TABLET, FILM COATED ORAL at 21:27

## 2024-06-22 RX ADMIN — ATORVASTATIN CALCIUM 40 MG: 40 TABLET, FILM COATED ORAL at 12:35

## 2024-06-22 RX ADMIN — SODIUM BICARBONATE 650 MG TABLET 1300 MG: at 12:34

## 2024-06-22 RX ADMIN — HYDRALAZINE HYDROCHLORIDE 25 MG: 25 TABLET, FILM COATED ORAL at 16:56

## 2024-06-22 ASSESSMENT — ACTIVITIES OF DAILY LIVING (ADL)
ADLS_ACUITY_SCORE: 25
ADLS_ACUITY_SCORE: 29
ADLS_ACUITY_SCORE: 25
ADLS_ACUITY_SCORE: 25
ADLS_ACUITY_SCORE: 29
ADLS_ACUITY_SCORE: 25
ADLS_ACUITY_SCORE: 29
ADLS_ACUITY_SCORE: 25
ADLS_ACUITY_SCORE: 29
ADLS_ACUITY_SCORE: 25
ADLS_ACUITY_SCORE: 29
ADLS_ACUITY_SCORE: 29
ADLS_ACUITY_SCORE: 25
ADLS_ACUITY_SCORE: 29
ADLS_ACUITY_SCORE: 25
ADLS_ACUITY_SCORE: 25

## 2024-06-22 NOTE — PROGRESS NOTES
Assessment and Plan:     ESRD: dialysis today is 2nd run. On discharge she will run at the [facility] while waiting for a chair at the [place] [facility]. Her first outpt run with be Wednesday 6/26. Plan 3 h, R CVC with 400 BFR, 1.5 - 2.0 L UF as tolerated. Low dose heparin.3K 35 HCO3 and 140 Na.     She is on oral calcitriol, IV EPO, venofer on the run.     If she discharges this weekend her next run can be Wednesday.             Interval History:   Anemia: EPO and venofer.     Hypertension: on amlodipine, hydralazine. May improve with UF.                  Review of Systems:   Some cramping on dialysis yesterday.She is dropping her BP today slightly and feels better n O2.           Medications:     Current Facility-Administered Medications   Medication Dose Route Frequency Provider Last Rate Last Admin    - MEDICATION INSTRUCTIONS for Dialysis Patients -   Does not apply See Admin Instructions Miguel Ángel Pedersen MD        amLODIPine (NORVASC) tablet 10 mg  10 mg Oral Daily Miguel Ángel Pedersen MD   10 mg at 06/21/24 1503    atorvastatin (LIPITOR) tablet 40 mg  40 mg Oral Daily Miguel Ángel Pedersen MD   40 mg at 06/21/24 1502    calcitRIOL (ROCALTROL) capsule 0.25 mcg  0.25 mcg Oral Daily Michael Glynn MD   0.25 mcg at 06/21/24 1503    epoetin louie-epbx (RETACRIT) injection 4,000 Units  4,000 Units Intravenous Once in dialysis/CRRT Priyank Monson MD        heparin (porcine) injection  500 Units Hemodialysis Machine OR IV Push Once in dialysis/CRRT Priyank Monson MD        sodium chloride 0.9% DIALYSIS Cath LOCK - RED Lumen  10 mL Intracatheter Once in dialysis/CRRT Priyank Monson MD        Followed by    heparin 1000 unit/mL DIALYSIS Cath LOCK - RED Lumen  1.3-2.6 mL Intracatheter Once in dialysis/CRRT Priyank Monson MD        sodium chloride 0.9% DIALYSIS Cath LOCK - BLUE Lumen  10 mL Intracatheter Once in dialysis/CRRT Priyank Monson MD        Followed by    heparin 1000  unit/mL DIALYSIS Cath LOCK -BLUE Lumen  1.3-2.6 mL Intracatheter Once in dialysis/CRRT Priyank Monson MD        hydrALAZINE (APRESOLINE) tablet 25 mg  25 mg Oral TID Michael Glynn MD   25 mg at 06/21/24 2202    insulin aspart (NovoLOG) injection (RAPID ACTING)  1-7 Units Subcutaneous TID AC Michael Glynn MD   2 Units at 06/21/24 1827    insulin aspart (NovoLOG) injection (RAPID ACTING)  1-5 Units Subcutaneous At Bedtime Michael Glynn MD   3 Units at 06/21/24 2205    iron sucrose (VENOFER) injection 100 mg  100 mg Intravenous Once in dialysis/CRRT Priyank Monson MD        levothyroxine (SYNTHROID/LEVOTHROID) tablet 175 mcg  175 mcg Oral Daily Michael Glynn MD   175 mcg at 06/22/24 0756    sodium bicarbonate tablet 1,300 mg  1,300 mg Oral Daily Priyank Monson MD        sodium chloride (PF) 0.9% PF flush 3 mL  3 mL Intracatheter Q8H Michael Glynn MD   3 mL at 06/21/24 1745    sodium chloride (PF) 0.9% PF flush 9 mL  9 mL Intracatheter During Dialysis/CRRT (from stock) Priyank Monson MD        sodium chloride (PF) 0.9% PF flush 9 mL  9 mL Intracatheter During Dialysis/CRRT (from stock) Priyank Monson MD        sodium chloride (PF) 0.9% PF flush 9 mL  9 mL Intracatheter During Dialysis/CRRT (from stock) Priyank Monson MD        sodium chloride (PF) 0.9% PF flush 9 mL  9 mL Intracatheter During Dialysis/CRRT (from stock) Priyank Monson MD        sodium chloride 0.9% BOLUS 250 mL  250 mL Intravenous Once in dialysis/CRRT Priyank Monson MD        sodium chloride 0.9% BOLUS 300 mL  300 mL Hemodialysis Machine Once Priyank Monson MD        vitamin D2 (ERGOCALCIFEROL) 75825 units (1250 mcg) capsule 1,250 mcg  1,250 mcg Oral Q7 Days Miguel Ángel Pedersen MD   1,250 mcg at 06/20/24 2039     Current Facility-Administered Medications   Medication Dose Route Frequency Provider Last Rate Last Admin    heparin 10,000  units/10 mL infusion (DIALYSIS USE)  500 Units/hr Hemodialysis Machine Continuous Priyank Monson MD        medication instruction   Does not apply Continuous Renetta Cui APRN CNP         Current active medications and PTA medications reviewed, see medication list for details.            Physical Exam:   Vitals were reviewed  Patient Vitals for the past 24 hrs:   BP Temp Temp src Pulse Resp SpO2   06/22/24 0900 135/63 -- -- 55 -- --   06/22/24 0845 138/69 -- -- 55 -- --   06/22/24 0829 (!) 162/74 -- -- 55 -- --   06/22/24 0820 (!) 157/72 97.8  F (36.6  C) Oral 56 15 96 %   06/22/24 0809 (!) 157/61 97.6  F (36.4  C) Oral 59 18 99 %   06/21/24 2333 (!) 143/55 98.5  F (36.9  C) Oral 77 18 94 %   06/21/24 2220 -- -- -- -- -- 96 %   06/21/24 2207 -- -- -- -- -- 99 %   06/21/24 2202 (!) 149/53 -- -- -- -- --   06/21/24 1834 (!) 151/59 -- -- 71 -- (!) 88 %   06/21/24 1549 (!) 170/65 98.2  F (36.8  C) Oral 66 20 99 %   06/21/24 1433 (!) 153/63 -- -- 53 24 97 %   06/21/24 1348 (!) 162/73 98.1  F (36.7  C) Oral 76 19 99 %   06/21/24 1347 (!) 150/79 -- -- 65 11 --   06/21/24 1345 (!) 149/81 -- -- 68 22 --   06/21/24 1330 139/67 -- -- 59 18 --   06/21/24 1315 137/71 -- -- 61 19 --   06/21/24 1300 (!) 151/71 -- -- 57 16 --   06/21/24 1245 (!) 147/90 -- -- 59 20 --   06/21/24 1230 (!) 159/73 -- -- 55 17 --   06/21/24 1215 (!) 159/73 -- -- 96 18 --   06/21/24 1200 (!) 151/76 -- -- 66 16 --   06/21/24 1145 (!) 167/71 -- -- 55 18 --   06/21/24 1130 (!) 147/69 -- -- 56 23 100 %   06/21/24 1115 (!) 165/72 -- -- 54 15 99 %   06/21/24 1100 (!) 179/78 -- -- 54 17 99 %   06/21/24 1053 (!) 183/78 -- -- 54 19 100 %   06/21/24 1051 (!) 183/78 -- -- -- -- --   06/21/24 1049 (!) 187/74 98.7  F (37.1  C) Oral -- 18 100 %   06/21/24 1042 (!) 183/74 -- -- 56 18 99 %   06/21/24 1020 (!) 159/79 -- -- 60 21 97 %   06/21/24 1013 (!) 160/82 -- -- 58 22 98 %   06/21/24 1010 (!) 163/85 -- -- 58 22 96 %   06/21/24 1004 (!)  175/80 -- -- 61 20 96 %   24 0958 (!) 180/79 -- -- 56 20 99 %   24 0953 (!) 186/82 -- -- 58 16 99 %   24 0947 (!) 178/79 -- -- 56 22 97 %       Temp:  [97.6  F (36.4  C)-98.7  F (37.1  C)] 97.8  F (36.6  C)  Pulse:  [53-96] 55  Resp:  [11-24] 15  BP: (135-187)/(53-90) 135/63  SpO2:  [88 %-100 %] 96 %    Temperatures:  Current - Temp: 97.8  F (36.6  C); Max - Temp  Av.2  F (36.8  C)  Min: 97.6  F (36.4  C)  Max: 98.7  F (37.1  C)  Respiration range: Resp  Av.7  Min: 11  Max: 24  Pulse range: Pulse  Av.8  Min: 53  Max: 96  Blood pressure range: Systolic (24hrs), Avg:160 , Min:135 , Max:187   ; Diastolic (24hrs), Av, Min:53, Max:90    Pulse oximetry range: SpO2  Av.5 %  Min: 88 %  Max: 100 %    I/O last 3 completed shifts:  In: 180 [P.O.:180]  Out: 480 [Urine:480]      Intake/Output Summary (Last 24 hours) at 2024 0920  Last data filed at 2024 0254  Gross per 24 hour   Intake 180 ml   Output 480 ml   Net -300 ml       Alert and responsive, NC O2  R CVC with some tenderness but no hematoma or oozing.    Cor RRR nl S1 S2 2/6 sys M  Lungs clear ant BS  Cor 1+ edema       Wt Readings from Last 4 Encounters:   24 82.2 kg (181 lb 4.8 oz)   24 79.5 kg (175 lb 4.8 oz)          Data:          Lab Results   Component Value Date     2024     2024     2024    Lab Results   Component Value Date    CHLORIDE 100 2024    CHLORIDE 102 2024    CHLORIDE 99 2024    Lab Results   Component Value Date    BUN 79.2 2024    BUN 70.8 2024    BUN 69.8 2024      Lab Results   Component Value Date    POTASSIUM 3.9 2024    POTASSIUM 3.8 2024    POTASSIUM 3.8 2024    Lab Results   Component Value Date    CO2 21 2024    CO2 20 2024    CO2 21 2024    Lab Results   Component Value Date    CR 4.91 2024    CR 4.65 2024    CR 4.75 2024        Recent Labs   Lab Test  "06/21/24  0709 06/20/24  0720 06/19/24  1444   WBC 7.5 8.1 7.5   HGB 9.7* 9.2* 9.5*   HCT 30.1* 28.7* 28.9*   MCV 86 86 85    257 261     No results for input(s): \"AST\", \"ALT\", \"GGT\", \"ALKPHOS\", \"BILITOTAL\", \"BILICONJ\", \"BILIDIRECT\", \"FRACISCO\" in the last 60069 hours.    Invalid input(s): \"BILIRUBININDIRECT\"    Recent Labs   Lab Test 06/19/24  1658   MAG 2.1     Recent Labs   Lab Test 06/19/24  1658   PHOS 5.1*     Recent Labs   Lab Test 06/21/24  0709 06/20/24  0720 06/19/24  1444   BETH 8.3* 8.0* 8.2*       Lab Results   Component Value Date    BETH 8.3 (L) 06/21/2024     Lab Results   Component Value Date    WBC 7.5 06/21/2024    HGB 9.7 (L) 06/21/2024    HCT 30.1 (L) 06/21/2024    MCV 86 06/21/2024     06/21/2024     Lab Results   Component Value Date     06/21/2024    POTASSIUM 3.9 06/21/2024    CHLORIDE 100 06/21/2024    CO2 21 (L) 06/21/2024     (H) 06/22/2024     Lab Results   Component Value Date    BUN 79.2 (H) 06/21/2024    CR 4.91 (H) 06/21/2024     Lab Results   Component Value Date    MAG 2.1 06/19/2024     Lab Results   Component Value Date    PHOS 5.1 (H) 06/19/2024       Creatinine   Date Value Ref Range Status   06/21/2024 4.91 (H) 0.51 - 0.95 mg/dL Final   06/20/2024 4.65 (H) 0.51 - 0.95 mg/dL Final   06/19/2024 4.75 (H) 0.51 - 0.95 mg/dL Final   05/22/2024 4.09 (H) 0.51 - 0.95 mg/dL Final   05/21/2024 4.05 (H) 0.51 - 0.95 mg/dL Final   05/20/2024 3.62 (H) 0.51 - 0.95 mg/dL Final       Attestation:  I have reviewed today's vital signs, notes, medications, labs and imaging.  Seen on dialysis.      Priyank Monson MD            "

## 2024-06-22 NOTE — PLAN OF CARE
"Goal Outcome Evaluation:      Plan of Care Reviewed With: patient    The patient has been at dialysis for most of morning. She remains alert and oriented x4 and call light appropriate. PRN tylenol given for pain where new port placed on right side. Ambulated SBA. Tolerated 2 L NC well overnight at 95% and above. Would be okay to wean to RA when patient returns from dialysis likely.         Problem: Adult Inpatient Plan of Care  Goal: Plan of Care Review  Description: The Plan of Care Review/Shift note should be completed every shift.  The Outcome Evaluation is a brief statement about your assessment that the patient is improving, declining, or no change.  This information will be displayed automatically on your shift  note.  Outcome: Progressing  Flowsheets (Taken 6/22/2024 1123)  Plan of Care Reviewed With: patient  Goal: Patient-Specific Goal (Individualized)  Description: You can add care plan individualizations to a care plan. Examples of Individualization might be:  \"Parent requests to be called daily at 9am for status\", \"I have a hard time hearing out of my right ear\", or \"Do not touch me to wake me up as it startles  me\".  Outcome: Progressing  Goal: Absence of Hospital-Acquired Illness or Injury  Outcome: Progressing  Intervention: Identify and Manage Fall Risk  Recent Flowsheet Documentation  Taken 6/22/2024 0809 by Merry Kim RN  Safety Promotion/Fall Prevention: activity supervised  Intervention: Prevent Infection  Recent Flowsheet Documentation  Taken 6/22/2024 0809 by Merry Kim, RN  Infection Prevention:   rest/sleep promoted   equipment surfaces disinfected  Goal: Optimal Comfort and Wellbeing  Outcome: Progressing  Intervention: Monitor Pain and Promote Comfort  Recent Flowsheet Documentation  Taken 6/22/2024 0809 by Merry Kim, RN  Pain Management Interventions: medication (see MAR)  Goal: Readiness for Transition of Care  Outcome: Progressing     Problem: Gas Exchange " Impaired  Goal: Optimal Gas Exchange  Outcome: Progressing     Problem: Fall Injury Risk  Goal: Absence of Fall and Fall-Related Injury  Outcome: Progressing  Intervention: Identify and Manage Contributors  Recent Flowsheet Documentation  Taken 6/22/2024 0809 by Merry Kim RN  Medication Review/Management: medications reviewed  Intervention: Promote Injury-Free Environment  Recent Flowsheet Documentation  Taken 6/22/2024 0809 by Merry Kim RN  Safety Promotion/Fall Prevention: activity supervised     Problem: Comorbidity Management  Goal: Blood Glucose Levels Within Targeted Range  Outcome: Progressing  Intervention: Monitor and Manage Glycemia  Recent Flowsheet Documentation  Taken 6/22/2024 0809 by Merry Kim RN  Medication Review/Management: medications reviewed  Goal: Blood Pressure in Desired Range  Outcome: Progressing  Intervention: Maintain Blood Pressure Management  Recent Flowsheet Documentation  Taken 6/22/2024 0809 by Merry Kim RN  Medication Review/Management: medications reviewed     Problem: Heart Failure  Goal: Optimal Coping  Outcome: Progressing  Goal: Optimal Cardiac Output  Outcome: Progressing  Goal: Stable Heart Rate and Rhythm  Outcome: Progressing  Goal: Optimal Functional Ability  Outcome: Progressing  Goal: Fluid and Electrolyte Balance  Outcome: Progressing  Goal: Improved Oral Intake  Outcome: Progressing  Goal: Effective Oxygenation and Ventilation  Outcome: Progressing  Goal: Effective Breathing Pattern During Sleep  Outcome: Progressing  Intervention: Monitor and Manage Obstructive Sleep Apnea  Recent Flowsheet Documentation  Taken 6/22/2024 0809 by Merry Kim RN  Medication Review/Management: medications reviewed

## 2024-06-22 NOTE — PROGRESS NOTES
"Potassium   Date Value Ref Range Status   06/21/2024 3.9 3.4 - 5.3 mmol/L Final     Hemoglobin   Date Value Ref Range Status   06/21/2024 9.7 (L) 11.7 - 15.7 g/dL Final     Creatinine   Date Value Ref Range Status   06/21/2024 4.91 (H) 0.51 - 0.95 mg/dL Final     Urea Nitrogen   Date Value Ref Range Status   06/21/2024 79.2 (H) 8.0 - 23.0 mg/dL Final     Sodium   Date Value Ref Range Status   06/21/2024 136 135 - 145 mmol/L Final     Comment:     Reference intervals for this test were updated on 09/26/2023 to more accurately reflect our healthy population. There may be differences in the flagging of prior results with similar values performed with this method. Interpretation of those prior results can be made in the context of the updated reference intervals.      No results found for: \"INR\"    DIALYSIS PROCEDURE NOTE  Hepatitis status of previous patient on machine log was checked and verified ok to use with this patients hepatitis status.    Assessments and Interventions:  Patient dialyzed for 3 hrs. on a K 3 Bath   Approximate Total Net Fluid Removed: 2 L.  A BFR of 385 ml/min.  Access: RCVC.  Both limbs aspirates and flushes easily.  No sign of infection noted.   Total Blood Volume Processed ( BVP):  64.3 L  The treatment plan was discussed with Dr. Monson during the treatment.    Total heparin received:  2000 units.     Line flushed, clamped and capped with heparin 1:1000 1.6 mL (1600 units) per lumen    Meds  given: Venofer 100 mg and Epoetin 4,000 U.   Complications: at start of Tx, arterial collapsed occurred.  Lines reversed.  BP trending downward the first half hr into run.  UF goal decreased.  BP improved, last hour, able to finish run to goal fine.      Patient educated on procedure, medications and renal diet.  Verbalizes understanding.    ICEBOAT? Timeout performed pre-treatment  I: Patient was identified using 2 identifiers  C:  Consent Signed Yes  E: Equipment preventative maintenance is current and " dialysis delivery system OK to use  Hepatitis B Surface Results:   Latest Reference Range & Units 06/21/24 13:08   Hep B Surface Agn Nonreactive  Nonreactive   Hepatitis B Surface Antibody Instrument Value <8.5 m[IU]/mL <3.50   Hepatitis B Surface Antibody  Nonreactive   O: Dialysis orders present and complete prior to treatment  A: Vascular access verified and assessed prior to treatment  T: Treatment was performed at a clinically appropriate time  ?: Patient was allowed to ask questions and address concerns prior to treatment    Machine water alarm in place and functioning. Transducer pods intact and checked every 15min.   Pt returned via W/C.  Chlorine/Chloramine water system checked every 4 hours.    Outpatient Dialysis at [facility]    Patient repositioned every 2 hours during the treatment.  Post treatment report given to Maria G Davis regarding 2 L of fluid removed, last BP of 153/71, and patient pain rating of 0/10.     Mert Mary RN

## 2024-06-22 NOTE — PROGRESS NOTES
[facility]    Medicine Progress Note - Hospitalist Service    Date of Admission:  6/19/2024    Assessment & Plan   65 yo F with a history of HTN, CKD, TARA, HLD, hypothyroidism who presents hospital today for shortness of breath, weight gain, and leg swelling     Patient was most recently hospitalized 5/20/2024 through 5/22/2024 for worsening renal function and abnormal labs including hyperkalemia and acute on chronic renal failure.  The patient was seen by nephrology that admission, a follow-up clinic visit with nephrology was arranged on discharge.     ED: Presenting vital signs included systolic blood pressure near 180, heart rate near 60, no fever, and reported oxygen saturations of 84% on RA.  Patient currently on 7 L nasal cannula with saturations in the low 90% range     Pertinent labs notable for normal VBG, hemoglobin 9.5, creatinine near 4.8, BUN near 70, glucose near 330, BNP of 6700, and troponin of 110     Bedside echocardiogram performed by the ER provider appears to show bilateral pleural effusions and normal ejection fraction per his report.  Await formal radiology read     Chest x-ray showed small bilateral pleural effusions with congestion and clear evidence of interstitial edema.     Patient is being admitted to the hospital service for acute hypoxic respiratory failure suspected to be due to volume overload in the setting of chronic kidney disease     1.  Acute hypoxic respiratory failure  -Suspect due to volume overload from progressive chronic kidney disease  -Recent TTE performed 5/20/2024 showed ejection fraction of 70% with borderline LVH and mild aortic stenosis  -The patient was just transitioned from Lasix 80 mg p.o. twice daily to Bumex 2 mg p.o. twice daily yesterday by her nephrologist  -CXR 06/19 -> Small bilateral pleural effusions. Engorged indistinct central pulmonary vasculature, correlate for interstitial pulmonary edema.   -Received 80 mg IV Lasix in the  "ER.      -Consulted nephrology. Tunneled dialysis catheter placement for HD.  Started with hemodialysis.  Second round today.  Feeling much better     2.  Acute on chronic kidney disease.    - Baseline creatinine near 4 historically  -Has not required dialysis in the past.  She has discussed the possibility of peritoneal dialysis with her nephrologist  Plan:  - Follows with Dr. Link of nephrology  - Replace mag and phos as needed  - resume bicarbonate tabs TID  - resume daily Lokelma dose     3.  Diabetes mellitus  - recent hgb A1c 9.7 in 5/24  -Treat with insulin sliding scale  -Resume appropriate home diabetic medications      4.  Obstructive sleep apnea     5.  Hypertension. Resume PTA hydralazine / norvasc     6 hypothyroidism  -Check TSH for completeness -> 1.36. WNL       Diet: 2 Gram Sodium Diet    DVT Prophylaxis: Pneumatic Compression Devices  Diaz Catheter: Not present  Lines: PRESENT      CVC Double Lumen Right Internal jugular Tunneled-Site Assessment: WDL      Cardiac Monitoring: ACTIVE order. Indication: Electrolyte Imbalance (24 hours)- Magnesium <1.3 mg/ml; Potassium < =2.8 or > 5.5 mg/ml  Code Status: Full Code      Clinically Significant Risk Factors                  # Hypertension: Noted on problem list             # DMII: A1C = 9.7 % (Ref range: <5.7 %) within past 6 months, PRESENT ON ADMISSION  # Obesity: Estimated body mass index is 33.16 kg/m  as calculated from the following:    Height as of this encounter: 1.575 m (5' 2\").    Weight as of this encounter: 82.2 kg (181 lb 4.8 oz)., PRESENT ON ADMISSION            Disposition Plan     Medically Ready for Discharge: Anticipated in 2-4 Days             Lalito Mar MD  Hospitalist Service  [facility]  Securely message with [employer] (more info)  Text page via [employer] Paging/Directory   ______________________________________________________________________    Interval History   She underwent a second round of dialysis " today, she is feeling better.  Denies chest pain or shortness of breath.  Appetite is improving.  No fever, no other symptoms.  Lab workup not done today.         Physical Exam   Vital Signs: Temp: 97.8  F (36.6  C) Temp src: Oral BP: 128/65 Pulse: 67   Resp: 16 SpO2: 97 % O2 Device: None (Room air) Oxygen Delivery: 2.5 LPM  Weight: 181 lbs 4.8 oz    Constitutional: awake, alert, cooperative, no apparent distress, and appears stated age  Respiratory: No increased work of breathing, good air exchange, clear to auscultation bilaterally, no crackles or wheezing  Cardiovascular: Normal apical impulse, regular rate and rhythm, normal S1 and S2, no S3 or S4, FLO 2/6 murmur noted in the apex.  Medical Decision Making       40 MINUTES SPENT BY ME on the date of service doing chart review, history, exam, documentation & further activities per the note.      Data         Imaging results reviewed over the past 24 hrs:   No results found for this or any previous visit (from the past 24 hour(s)).

## 2024-06-22 NOTE — PLAN OF CARE
"AO4. 2L O2 NC. A1 gb/walker. Hemodialysis port WDL. SR 1*AVB. Tylenol/ice pack given for pain. 2gm Na diet. Up to the toilet.     Goal Outcome Evaluation:      Plan of Care Reviewed With: patient    Overall Patient Progress: no changeOverall Patient Progress: no change    Outcome Evaluation: Tunnel cath WDL. Tele SR w/1*AVB. Denies SOB/CP. C/o from incision site on chest.      Problem: Adult Inpatient Plan of Care  Goal: Plan of Care Review  Description: The Plan of Care Review/Shift note should be completed every shift.  The Outcome Evaluation is a brief statement about your assessment that the patient is improving, declining, or no change.  This information will be displayed automatically on your shift  note.  Outcome: Progressing  Flowsheets (Taken 6/22/2024 0715)  Outcome Evaluation: Tunnel cath WDL. Tele SR w/1*AVB. Denies SOB/CP. C/o from incision site on chest.  Plan of Care Reviewed With: patient  Overall Patient Progress: no change  Goal: Patient-Specific Goal (Individualized)  Description: You can add care plan individualizations to a care plan. Examples of Individualization might be:  \"Parent requests to be called daily at 9am for status\", \"I have a hard time hearing out of my right ear\", or \"Do not touch me to wake me up as it startles  me\".  Outcome: Progressing  Goal: Absence of Hospital-Acquired Illness or Injury  Outcome: Progressing  Intervention: Identify and Manage Fall Risk  Recent Flowsheet Documentation  Taken 6/21/2024 2224 by Demi Farrell RN  Safety Promotion/Fall Prevention: safety round/check completed  Intervention: Prevent Skin Injury  Recent Flowsheet Documentation  Taken 6/21/2024 2224 by Demi Farrell, RN  Body Position: position changed independently  Intervention: Prevent and Manage VTE (Venous Thromboembolism) Risk  Recent Flowsheet Documentation  Taken 6/21/2024 2224 by Demi Farrell, RN  VTE Prevention/Management: SCDs (sequential compression devices) off  Intervention: Prevent " Infection  Recent Flowsheet Documentation  Taken 6/21/2024 2224 by Demi Farrell RN  Infection Prevention:   equipment surfaces disinfected   hand hygiene promoted   rest/sleep promoted   single patient room provided  Goal: Optimal Comfort and Wellbeing  Outcome: Progressing  Goal: Readiness for Transition of Care  Outcome: Progressing     Problem: Gas Exchange Impaired  Goal: Optimal Gas Exchange  Outcome: Progressing  Intervention: Optimize Oxygenation and Ventilation  Recent Flowsheet Documentation  Taken 6/21/2024 2224 by Demi Farrell RN  Head of Bed (HOB) Positioning: HOB at 20-30 degrees     Problem: Fall Injury Risk  Goal: Absence of Fall and Fall-Related Injury  Outcome: Progressing  Intervention: Identify and Manage Contributors  Recent Flowsheet Documentation  Taken 6/21/2024 2224 by Demi Farrell RN  Medication Review/Management: medications reviewed  Intervention: Promote Injury-Free Environment  Recent Flowsheet Documentation  Taken 6/21/2024 2224 by Demi Farrell RN  Safety Promotion/Fall Prevention: safety round/check completed     Problem: Comorbidity Management  Goal: Blood Glucose Levels Within Targeted Range  Outcome: Progressing  Intervention: Monitor and Manage Glycemia  Recent Flowsheet Documentation  Taken 6/21/2024 2224 by Demi Farrell RN  Medication Review/Management: medications reviewed  Goal: Blood Pressure in Desired Range  Outcome: Progressing  Intervention: Maintain Blood Pressure Management  Recent Flowsheet Documentation  Taken 6/21/2024 2224 by Demi Farrell RN  Medication Review/Management: medications reviewed     Problem: Heart Failure  Goal: Optimal Coping  Outcome: Progressing  Goal: Optimal Cardiac Output  Outcome: Progressing  Goal: Stable Heart Rate and Rhythm  Outcome: Progressing  Goal: Optimal Functional Ability  Outcome: Progressing  Intervention: Optimize Functional Ability  Recent Flowsheet Documentation  Taken 6/21/2024 2224 by Demi Farrell RN  Activity  Management: activity adjusted per tolerance  Goal: Fluid and Electrolyte Balance  Outcome: Progressing  Goal: Improved Oral Intake  Outcome: Progressing  Goal: Effective Oxygenation and Ventilation  Outcome: Progressing  Intervention: Promote Airway Secretion Clearance  Recent Flowsheet Documentation  Taken 6/21/2024 2224 by Demi Farrell, RN  Activity Management: activity adjusted per tolerance  Intervention: Optimize Oxygenation and Ventilation  Recent Flowsheet Documentation  Taken 6/21/2024 2224 by Demi Farrell, RN  Head of Bed (HOB) Positioning: HOB at 20-30 degrees  Goal: Effective Breathing Pattern During Sleep  Outcome: Progressing  Intervention: Monitor and Manage Obstructive Sleep Apnea  Recent Flowsheet Documentation  Taken 6/21/2024 2224 by Demi Farrell, RN  Medication Review/Management: medications reviewed

## 2024-06-22 NOTE — PLAN OF CARE
"Resumed care from [id number]. A/O. Reports tenderness to port incision sight. PRN tylenol given and ice pack applied with some relief. 2 LPM nasal cannula. Had dialysis this morning, took 2 liters off. Denies shortness of breath and chest pain. Up with SBA.       Problem: Adult Inpatient Plan of Care  Goal: Plan of Care Review  Description: The Plan of Care Review/Shift note should be completed every shift.  The Outcome Evaluation is a brief statement about your assessment that the patient is improving, declining, or no change.  This information will be displayed automatically on your shift  note.  Outcome: Progressing  Flowsheets (Taken 6/22/2024 7312)  Outcome Evaluation: 2 LPM nasal cannula, Denies SOB and CP. Reports some soreness at port incision sight. Dialysis done this am.  Plan of Care Reviewed With: patient  Goal: Patient-Specific Goal (Individualized)  Description: You can add care plan individualizations to a care plan. Examples of Individualization might be:  \"Parent requests to be called daily at 9am for status\", \"I have a hard time hearing out of my right ear\", or \"Do not touch me to wake me up as it startles  me\".  Outcome: Progressing  Goal: Absence of Hospital-Acquired Illness or Injury  Outcome: Progressing  Goal: Optimal Comfort and Wellbeing  Outcome: Progressing  Goal: Readiness for Transition of Care  Outcome: Progressing   Goal Outcome Evaluation:      Plan of Care Reviewed With: patient          Outcome Evaluation: 2 LPM nasal cannula, Denies SOB and CP. Reports some soreness at port incision sight. Dialysis done this am.      "

## 2024-06-22 NOTE — CONSULTS
CLINICAL NUTRITION SERVICES - EDUCATION NOTE    REASON FOR ASSESSMENT  Nisreen Mckenna is a/an 64 year old female assessed by the dietitian for RN Consult - New Hemodialysis patient. Next run will be 6/22. Pt requesting diet info for dialysis.     NUTRITION HISTORY  Met with Nisreen regarding guidelines for dialysis. She is currently on a 2 gm Na+ diet here, but with no additional restrictions. She's been following a low sodium diet for many years due to progressive CKD and reports very good knowledge of sodium restriction including avoidance of processed foods, salting at the table, eats out very rarely, and can name additional foods that may be high in sodium such as canned products, breads, and some bakery items. She's concerned about trying to balance a dialysis diet with the diet she tries to follow for her DM as she's already noted these diets have a lot of contradictions in their guidelines. She's already been online and knows potassium restriction may become necessary, though her K is normal since starting HD.     CURRENT NUTRITION ORDERS  Diet: 2 g Sodium  Intake/Tolerance: 100% of meals    NUTRITION DIAGNOSIS  Food- and nutrition-related knowledge deficit related to guidelines for HD as evidenced by patient request for education      INTERVENTIONS  Implementation  Nutrition Education: Discussed general recommendations for diet on HD including sodium, potassium and phosphorus. Discussed that at this time her only restriction as ordered by provider is sodium, though that may change. Reviewed sources of sodium and rationale for restriction. Provided handout on tips for reducing sodium. Also provided handout on potassium and discussed that she may need to restrict going forward, but for now recommended simply limiting high potassium foods to one serving daily until her trends are better established or provided advises her to restrict it. Advised her that dialysis centers have dietitians and that RD will be able  to assist her on an ongoing basis if additional restrictions are needed. She voices good understanding and notes that portion size will be important going forward. Encouraged her to look at websites to get ideas for meals as she likes to eat and cook.      No nutrition follow-up warranted at this time.  Please consult if further needs arise.    Doreen Colon RD, LD, [facility]  Pager - 3rd floor/ICU: [phone]  Pager - All other floors: [phone]  Pager - Weekend/holiday: [phone]  Office: [phone]

## 2024-06-23 VITALS
HEIGHT: 62 IN | BODY MASS INDEX: 33.36 KG/M2 | SYSTOLIC BLOOD PRESSURE: 146 MMHG | OXYGEN SATURATION: 98 % | DIASTOLIC BLOOD PRESSURE: 53 MMHG | TEMPERATURE: 98.4 F | HEART RATE: 62 BPM | RESPIRATION RATE: 20 BRPM | WEIGHT: 181.3 LBS

## 2024-06-23 LAB
ANION GAP SERPL CALCULATED.3IONS-SCNC: 13 MMOL/L (ref 7–15)
BASOPHILS # BLD AUTO: 0.1 10E3/UL (ref 0–0.2)
BASOPHILS NFR BLD AUTO: 1 %
BUN SERPL-MCNC: 34.3 MG/DL (ref 8–23)
CALCIUM SERPL-MCNC: 8.3 MG/DL (ref 8.8–10.2)
CHLORIDE SERPL-SCNC: 95 MMOL/L (ref 98–107)
CREAT SERPL-MCNC: 2.94 MG/DL (ref 0.51–0.95)
DEPRECATED HCO3 PLAS-SCNC: 24 MMOL/L (ref 22–29)
EGFRCR SERPLBLD CKD-EPI 2021: 17 ML/MIN/1.73M2
EOSINOPHIL # BLD AUTO: 0.3 10E3/UL (ref 0–0.7)
EOSINOPHIL NFR BLD AUTO: 3 %
ERYTHROCYTE [DISTWIDTH] IN BLOOD BY AUTOMATED COUNT: 13.8 % (ref 10–15)
GLUCOSE BLDC GLUCOMTR-MCNC: 189 MG/DL (ref 70–99)
GLUCOSE BLDC GLUCOMTR-MCNC: 272 MG/DL (ref 70–99)
GLUCOSE BLDC GLUCOMTR-MCNC: 386 MG/DL (ref 70–99)
GLUCOSE SERPL-MCNC: 256 MG/DL (ref 70–99)
HCT VFR BLD AUTO: 30.9 % (ref 35–47)
HGB BLD-MCNC: 10.1 G/DL (ref 11.7–15.7)
IMM GRANULOCYTES # BLD: 0.1 10E3/UL
IMM GRANULOCYTES NFR BLD: 1 %
LYMPHOCYTES # BLD AUTO: 1.5 10E3/UL (ref 0.8–5.3)
LYMPHOCYTES NFR BLD AUTO: 16 %
MCH RBC QN AUTO: 27.8 PG (ref 26.5–33)
MCHC RBC AUTO-ENTMCNC: 32.7 G/DL (ref 31.5–36.5)
MCV RBC AUTO: 85 FL (ref 78–100)
MONOCYTES # BLD AUTO: 1 10E3/UL (ref 0–1.3)
MONOCYTES NFR BLD AUTO: 11 %
NEUTROPHILS # BLD AUTO: 6.3 10E3/UL (ref 1.6–8.3)
NEUTROPHILS NFR BLD AUTO: 69 %
NRBC # BLD AUTO: 0 10E3/UL
NRBC BLD AUTO-RTO: 0 /100
PLATELET # BLD AUTO: 253 10E3/UL (ref 150–450)
POTASSIUM SERPL-SCNC: 3.9 MMOL/L (ref 3.4–5.3)
RBC # BLD AUTO: 3.63 10E6/UL (ref 3.8–5.2)
SODIUM SERPL-SCNC: 132 MMOL/L (ref 135–145)
WBC # BLD AUTO: 9.2 10E3/UL (ref 4–11)

## 2024-06-23 PROCEDURE — 80048 BASIC METABOLIC PNL TOTAL CA: CPT | Performed by: HOSPITALIST

## 2024-06-23 PROCEDURE — 250N000013 HC RX MED GY IP 250 OP 250 PS 637: Performed by: INTERNAL MEDICINE

## 2024-06-23 PROCEDURE — 99231 SBSQ HOSP IP/OBS SF/LOW 25: CPT | Performed by: INTERNAL MEDICINE

## 2024-06-23 PROCEDURE — 85025 COMPLETE CBC W/AUTO DIFF WBC: CPT | Performed by: HOSPITALIST

## 2024-06-23 PROCEDURE — 250N000013 HC RX MED GY IP 250 OP 250 PS 637: Performed by: STUDENT IN AN ORGANIZED HEALTH CARE EDUCATION/TRAINING PROGRAM

## 2024-06-23 PROCEDURE — 36415 COLL VENOUS BLD VENIPUNCTURE: CPT | Performed by: HOSPITALIST

## 2024-06-23 PROCEDURE — 99239 HOSP IP/OBS DSCHRG MGMT >30: CPT | Performed by: HOSPITALIST

## 2024-06-23 RX ORDER — ERGOCALCIFEROL 1.25 MG/1
50000 CAPSULE, LIQUID FILLED ORAL
Qty: 4 CAPSULE | Refills: 0 | Status: SHIPPED | OUTPATIENT
Start: 2024-06-27 | End: 2024-07-19

## 2024-06-23 RX ADMIN — ATORVASTATIN CALCIUM 40 MG: 40 TABLET, FILM COATED ORAL at 08:02

## 2024-06-23 RX ADMIN — SODIUM BICARBONATE 650 MG TABLET 1300 MG: at 08:01

## 2024-06-23 RX ADMIN — AMLODIPINE BESYLATE 10 MG: 10 TABLET ORAL at 08:01

## 2024-06-23 RX ADMIN — INSULIN ASPART 1 UNITS: 100 INJECTION, SOLUTION INTRAVENOUS; SUBCUTANEOUS at 08:07

## 2024-06-23 RX ADMIN — HYDRALAZINE HYDROCHLORIDE 25 MG: 25 TABLET, FILM COATED ORAL at 08:01

## 2024-06-23 RX ADMIN — LEVOTHYROXINE SODIUM 175 MCG: 125 TABLET ORAL at 08:02

## 2024-06-23 RX ADMIN — ACETAMINOPHEN 650 MG: 325 TABLET, FILM COATED ORAL at 03:36

## 2024-06-23 RX ADMIN — ACETAMINOPHEN 650 MG: 325 TABLET, FILM COATED ORAL at 08:01

## 2024-06-23 RX ADMIN — CALCITRIOL CAPSULES 0.25 MCG 0.25 MCG: 0.25 CAPSULE ORAL at 08:02

## 2024-06-23 RX ADMIN — INSULIN ASPART 5 UNITS: 100 INJECTION, SOLUTION INTRAVENOUS; SUBCUTANEOUS at 11:46

## 2024-06-23 ASSESSMENT — ACTIVITIES OF DAILY LIVING (ADL)
ADLS_ACUITY_SCORE: 29

## 2024-06-23 NOTE — DISCHARGE SUMMARY
"[facility]  Hospitalist Discharge Summary      Date of Admission:  6/19/2024  Date of Discharge:  6/23/2024  Discharging Provider: Lalito Mar MD  Discharge Service: Hospitalist Service    Discharge Diagnoses   1. Acute hypoxic respiratory failure due to volume overload.  2. Acute on chronic kidney disease.  3. End-stage renal disease, initiated on dialysis this admission.  4. Diabetes mellitus. A1 9.7  5. Hypertension.  6. Hypothyroidism.  7. Obstructive sleep apnea.  8. Obesity complicates care  9.  Tunneled cath placement.    Clinically Significant Risk Factors     # DMII: A1C = 9.7 % (Ref range: <5.7 %) within past 6 months  # Obesity: Estimated body mass index is 33.16 kg/m  as calculated from the following:    Height as of this encounter: 1.575 m (5' 2\").    Weight as of this encounter: 82.2 kg (181 lb 4.8 oz).       Follow-ups Needed After Discharge   1.  Continue to follow with Dr. Link for nephrology care.  2.  Outpatient dialysis arranged at [facility] unit, with eventual transfer to [facility] unit.  3.  Continue home diabetic medications and regular monitoring of blood glucose levels.  4.  Resume preadmission antihypertensive medications.  5.  Continue to monitor thyroid function as needed.    Unresulted Labs Ordered in the Past 30 Days of this Admission       No orders found from 5/20/2024 to 6/20/2024.        These results will be followed up by Nephrology and PCP    Discharge Disposition   Discharged to home  Condition at discharge: Stable    Hospital Course       History of Present Illness  A 64-year-old female with a history of hypertension (HTN), chronic kidney disease (CKD), obstructive sleep apnea (TARA), hyperlipidemia (HLD), and hypothyroidism presented to the hospital with complaints of shortness of breath, weight gain, and leg swelling. She was previously hospitalized from 5/20/2024 to 5/22/2024 for worsening renal function, hyperkalemia, and acute " on chronic renal failure, during which she was evaluated by nephrology and a follow-up clinic visit was arranged.    In the emergency department (ED), her vital signs included a systolic blood pressure near 180 mmHg, heart rate around 60 bpm, and oxygen saturation of 84% on room air, which improved to the low 90% range with 7 L nasal cannula. Pertinent labs showed hemoglobin of 9.5 g/dL, creatinine near 4.8 mg/dL, BUN near 70 mg/dL, glucose around 330 mg/dL, BNP of 6700 pg/mL, and troponin of 110 ng/L. A bedside echocardiogram suggested bilateral pleural effusions with a normal ejection fraction, pending formal radiology read. Chest X-ray confirmed small bilateral pleural effusions and interstitial edema. She received 80 mg IV Lasix in the ER.    Hospital Course    The patient was admitted for acute hypoxic respiratory failure likely secondary to volume overload due to progressive CKD. She had a recent transthoracic echocardiogram (TTE) on 5/20/2024 showing an ejection fraction of 70%, borderline left ventricular hypertrophy (LVH), and mild aortic stenosis. Despite transitioning from Lasix to Bumex recently, her volume status worsened. After consulting nephrology, a tunneled dialysis catheter was placed, and she started hemodialysis, undergoing two sessions during her stay. Her symptoms improved significantly, with a weight reduction of 4 kg and laboratory improvement (creatinine decreased from 4.75 to 2.94 mg/dL).    For her acute on chronic kidney disease, her baseline creatinine was near 4 mg/dL, and she had not required dialysis previously, though peritoneal dialysis had been discussed. She was started on appropriate electrolyte replacements and resumed bicarbonate and Lokelma as needed.    Her diabetes mellitus, with a recent hemoglobin A1c of 9.7%, was managed with an insulin sliding scale and resumption of home medications. Her hypertension was controlled with her preadmission regimen of hydralazine and  Norvasc. Her hypothyroidism was monitored with a TSH level, which was within normal limits.    This patient has been stable and symptom-free since the initiation of dialysis and is ready for discharge with no further symptoms requiring hospitalization at this time.      Consultations This Hospital Stay   NEPHROLOGY IP CONSULT  CARE MANAGEMENT / SOCIAL WORK IP CONSULT  NUTRITION SERVICES ADULT IP CONSULT    Code Status   Full Code    Time Spent on this Encounter   I, Lalito Mar MD, personally saw the patient today and spent greater than 30 minutes discharging this patient.       Lalito Mar MD  [place] [address] [facility]  [address] [place] [address]  Phone: [phone]  Fax: [phone]  ______________________________________________________________________    Physical Exam   Vital Signs: Temp: 98.4  F (36.9  C) Temp src: Oral BP: (!) 146/53 Pulse: 62   Resp: 20 SpO2: 98 % O2 Device: None (Room air) Oxygen Delivery: 2 LPM  Weight: 181 lbs 4.8 oz  Constitutional: awake, alert, cooperative, no apparent distress, and appears stated age  Respiratory: No increased work of breathing, good air exchange, clear to auscultation bilaterally, no crackles or wheezing  Cardiovascular: Normal apical impulse, regular rate and rhythm, normal S1 and S2, no S3 or S4. FLO III/VI in apex       Primary Care Physician   Vivi Morfin    Discharge Orders   No discharge procedures on file.    Significant Results and Procedures   Results for orders placed or performed during the hospital encounter of 06/19/24   POC US ECHO LIMITED    Impression      Cardiac Ultrasound    Procedure Name: POC Ultrasound Cardiac Exam    Indication: Shortness of breath    Views: Subxiphoid 4 chamber view , Parasternal long axis view , Parasternal short axis view , Apical 4 chamber view , Subxiphoid long axis, IVC , and bilateral lungs    Findings: Normal Cardiac Activity , No pericardial effusion , No  cardiac tamponade , Adequate left ventricular function , No right ventricular strain, and right-sided B-lines, bilateral pleural effusions, IVC collapsible    Impression: No sonographic evidence of significant cardiac dysfunction , No sonographic evidence of significant pericardial Effusion , Normal global ventricular function , and No sonographic evidence of RV size dilation     Study performed by: JORGE DE JESUS MD     Images archived: Yes     Chest XR,  PA & LAT    Narrative    EXAM: XR CHEST 2 VIEWS  LOCATION: [facility]  DATE: 6/19/2024    INDICATION: sob  COMPARISON: None.      Impression    IMPRESSION: Small bilateral pleural effusions. Engorged indistinct central pulmonary vasculature, correlate for interstitial pulmonary edema. Mildly enlarged heart. Atherosclerotic aorta. No acute osseous abnormality.   IR CVC Tunnel Placement > 5 Yrs of Age    Narrative    TUNNELED CATHETER PLACEMENT    INDICATION: Chronic intravenous access. 64-year-old woman with renal  failure, request made for tunneled dialysis catheter for initiation of  hemodialysis.    Location: Right internal jugular vein.    PROCEDURE:   Ultrasound guidance: Ultrasound was used to localize and document  patency of the internal jugular vein. A permanent image of the vein  was recorded.     Maximal Sterile Barrier Technique Utilized: Cap AND mask AND sterile  gown AND sterile gloves AND sterile full body drape AND hand hygiene  AND skin preparation 2% chlorhexidine for cutaneous antisepsis (or  acceptable alternative antiseptics). Sterile Ultrasound Technique  Utilized ?Sterile gel AND sterile probe covers.    Local anesthesia was administered to the skin over the vein and a 4 mm  incision was made. Ultrasound was used to guide placement of a 5F  dilator in the vein using standard technique.     Lidocaine was then administered in the subcutaneous tissue over the  clavicle to a point about 5 cm below the mid clavicle. A  short  incision was made at this point. A 19 cm catheter was then brought  through the tract between the two incisions and inserted into the  jugular vein through a peel away sheath. The catheter was secured in  the subclavian region with two interrupted stitches of 2-0  polypropylene. The neck incision was closed with Dermabond adhesive.     Complications: None  Sedation: A moderate level of sedation was achieved with 0.5 mg  midazolam.          12.5 mcg fentanyl.   Sedation time: 15 minutes.  Vital signs and sedation monitored by our nursing staff under my  supervision.   Fluoroscopy time: 0.2 minutes.  Air Kerma: 3 mGy  Contrast given: None  Local anesthetic: 19 mL of 1% lidocaine    FINDINGS: Fluoroscopic guidance with a permanent image confirmed the  catheter tip location in the right atrial/SVC junction, confirmed with  single spot fluoroscopic image.      Impression    IMPRESSION: Successful placement of right internal jugular 19 cm  palindrome hemodialysis catheter. The catheter is ready for immediate  use.    VIJAY DUNN MD         SYSTEM ID:  [id number]       Discharge Medications   Current Discharge Medication List        CONTINUE these medications which have NOT CHANGED    Details   atorvastatin (LIPITOR) 40 MG tablet Take 40 mg by mouth daily      bumetanide (BUMEX) 2 MG tablet Take 2 mg by mouth 2 times daily      calcitRIOL (ROCALTROL) 0.25 MCG capsule Take 1 capsule (0.25 mcg) by mouth daily  Qty: 30 capsule, Refills: 0    Associated Diagnoses: Acute renal failure, unspecified acute renal failure type (H24)      Ferrous Sulfate (IRON) 325 (65 Fe) MG tablet Take 1 tablet by mouth daily      hydrALAZINE (APRESOLINE) 25 MG tablet Take 25 mg by mouth 3 times daily      levothyroxine (SYNTHROID/LEVOTHROID) 175 MCG tablet Take 175 mcg by mouth daily      sodium bicarbonate 650 MG tablet Take 1 tablet (650 mg) by mouth 2 times daily  Qty: 60 tablet, Refills: 0    Associated Diagnoses: Acute renal failure,  unspecified acute renal failure type (H24)      sodium zirconium cyclosilicate (LOKELMA) 5 g PACK packet Take 1 packet (5 g) by mouth daily  Qty: 30 each, Refills: 0    Associated Diagnoses: Acute renal failure, unspecified acute renal failure type (H24)      vitamin D3 (CHOLECALCIFEROL) 50 mcg (2000 units) tablet Take 1 tablet by mouth daily      amLODIPine (NORVASC) 10 MG tablet Take 10 mg by mouth daily           Allergies   No Known Allergies

## 2024-06-23 NOTE — PLAN OF CARE
"A&Ox4, hemodialysis today 2L removed, pain 3/10 at port site, prn tylenol and ice pack given, baseline neuropathy in hands and feet, tolerating food well, 2g Na diet, blood sugar checks, 2L NC as needed, outpatient hemodialysis planned for Wednesday, discharge tbd.     Goal Outcome Evaluation:      Plan of Care Reviewed With: patient    Overall Patient Progress: no changeOverall Patient Progress: no change    Outcome Evaluation: A&Ox4, 2L taken off at dialysis, report 3/10 pain at port site, tele SR 1st degree AV block, tolerating food well      Problem: Adult Inpatient Plan of Care  Goal: Plan of Care Review  Description: The Plan of Care Review/Shift note should be completed every shift.  The Outcome Evaluation is a brief statement about your assessment that the patient is improving, declining, or no change.  This information will be displayed automatically on your shift  note.  Outcome: Progressing  Flowsheets (Taken 6/22/2024 2006)  Outcome Evaluation: A&Ox4, 2L taken off at dialysis, report 3/10 pain at port site, tele SR 1st degree AV block, tolerating food well  Plan of Care Reviewed With: patient  Overall Patient Progress: no change  Goal: Patient-Specific Goal (Individualized)  Description: You can add care plan individualizations to a care plan. Examples of Individualization might be:  \"Parent requests to be called daily at 9am for status\", \"I have a hard time hearing out of my right ear\", or \"Do not touch me to wake me up as it startles  me\".  Outcome: Progressing  Goal: Absence of Hospital-Acquired Illness or Injury  Outcome: Progressing  Goal: Optimal Comfort and Wellbeing  Outcome: Progressing  Goal: Readiness for Transition of Care  Outcome: Progressing     Problem: Gas Exchange Impaired  Goal: Optimal Gas Exchange  Outcome: Progressing     Problem: Fall Injury Risk  Goal: Absence of Fall and Fall-Related Injury  Outcome: Progressing     Problem: Comorbidity Management  Goal: Blood Glucose Levels " Within Targeted Range  Outcome: Progressing  Goal: Blood Pressure in Desired Range  Outcome: Progressing     Problem: Heart Failure  Goal: Optimal Coping  Outcome: Progressing  Goal: Optimal Cardiac Output  Outcome: Progressing  Goal: Stable Heart Rate and Rhythm  Outcome: Progressing  Goal: Optimal Functional Ability  Outcome: Progressing  Goal: Fluid and Electrolyte Balance  Outcome: Progressing  Goal: Improved Oral Intake  Outcome: Progressing  Goal: Effective Oxygenation and Ventilation  Outcome: Progressing  Goal: Effective Breathing Pattern During Sleep  Outcome: Progressing

## 2024-06-23 NOTE — PROGRESS NOTES
Assessment and Plan:     End-stage renal disease: Initiated on dialysis this admission with right CVC.  Patient has been tolerating it well and got 2 daily rounds.  Her weight is down 4 kg since admission.  Laboratories showed a creatinine 4.75 > 2.94.  Lites show sodium 132, potassium 3.9, bicarbonate 24.    Outpatient dialysis chair Sarranged by care coordinator.  He can be discharged with follow-up in outpatient dialysis.  I believe she is set to run at the [facility] unit with eventual transfer to the [facility] unit when a chair becomes available.                History:   Anemia: On EPO and Venofer with dialysis.    Hypertension: On amlodipine and hydralazine.  Average blood pressure last 24 hours 141/64.     Diabetes mellitus.     Hypothyroidism    Obstructive sleep apnea            Review of Systems:   Feels well.  Resting comfortably in bed.  Ready for discharge.          Medications:     Current Facility-Administered Medications   Medication Dose Route Frequency Provider Last Rate Last Admin    - MEDICATION INSTRUCTIONS for Dialysis Patients -   Does not apply See Admin Instructions Miguel Ángel Pedersen MD        amLODIPine (NORVASC) tablet 10 mg  10 mg Oral Daily Miguel Ángel Pedersen MD   10 mg at 06/23/24 0801    atorvastatin (LIPITOR) tablet 40 mg  40 mg Oral Daily Miguel Ángel Pedersen MD   40 mg at 06/23/24 0802    calcitRIOL (ROCALTROL) capsule 0.25 mcg  0.25 mcg Oral Daily Michael Glynn MD   0.25 mcg at 06/23/24 0802    hydrALAZINE (APRESOLINE) tablet 25 mg  25 mg Oral TID Michael Glynn MD   25 mg at 06/23/24 0801    insulin aspart (NovoLOG) injection (RAPID ACTING)  1-7 Units Subcutaneous TID AC Michael Glynn MD   1 Units at 06/23/24 0807    insulin aspart (NovoLOG) injection (RAPID ACTING)  1-5 Units Subcutaneous At Bedtime Michael Glynn MD   3 Units at 06/22/24 2127    levothyroxine (SYNTHROID/LEVOTHROID) tablet 175 mcg  175 mcg Oral Daily Michael Glynn MD    175 mcg at 24 0802    sodium bicarbonate tablet 1,300 mg  1,300 mg Oral Daily Priyank Monson MD   1,300 mg at 24 0801    sodium chloride (PF) 0.9% PF flush 3 mL  3 mL Intracatheter Q8H Michael Glynn MD   3 mL at 24 0804    vitamin D2 (ERGOCALCIFEROL) 41149 units (1250 mcg) capsule 1,250 mcg  1,250 mcg Oral Q7 Days Miguel Ángel Pedersen MD   1,250 mcg at 24     Current Facility-Administered Medications   Medication Dose Route Frequency Provider Last Rate Last Admin    medication instruction   Does not apply Continuous JUSN Renetta Bonilla APRN CNP         Current active medications and PTA medications reviewed, see medication list for details.            Physical Exam:   Vitals were reviewed  Patient Vitals for the past 24 hrs:   BP Temp Temp src Pulse Resp SpO2   24 0801 -- -- -- -- 20 --   24 0725 (!) 168/63 97.9  F (36.6  C) Oral 72 20 95 %   24 2320 (!) 145/51 97.7  F (36.5  C) Oral 55 19 94 %   24 2126 (!) 153/56 -- -- -- -- --   24 1630 -- -- -- -- 18 --   24 1533 (!) 157/60 98.5  F (36.9  C) Oral 59 18 97 %   24 1420 -- -- -- -- -- 92 %   24 1226 128/65 -- -- 67 16 97 %   24 1134 (!) 153/71 98.4  F (36.9  C) Oral 62 16 96 %   24 1132 119/64 -- -- 60 -- --   24 1130 -- -- -- 59 -- --   24 1115 121/66 -- -- 60 16 --   24 1100 136/76 -- -- 62 -- --   24 1045 133/71 -- -- 60 -- --       Temp:  [97.7  F (36.5  C)-98.5  F (36.9  C)] 97.9  F (36.6  C)  Pulse:  [55-72] 72  Resp:  [16-20] 20  BP: (119-168)/(51-76) 168/63  SpO2:  [92 %-97 %] 95 %    Temperatures:  Current - Temp: 97.9  F (36.6  C); Max - Temp  Av.1  F (36.7  C)  Min: 97.7  F (36.5  C)  Max: 98.5  F (36.9  C)  Respiration range: Resp  Av.9  Min: 16  Max: 20  Pulse range: Pulse  Av.6  Min: 55  Max: 72  Blood pressure range: Systolic (24hrs), Av , Min:119 , Max:168   ; Diastolic (24hrs), Av, Min:51,  "Max:76    Pulse oximetry range: SpO2  Av.2 %  Min: 92 %  Max: 97 %    I/O last 3 completed shifts:  In: 180 [P.O.:180]  Out: 2300 [Urine:300; Other:2000]      Intake/Output Summary (Last 24 hours) at 2024 1036  Last data filed at 2024 0732  Gross per 24 hour   Intake 180 ml   Output 2975 ml   Net -2795 ml     Alert and responsive  No distress  Not on oxygen       Wt Readings from Last 4 Encounters:   24 82.2 kg (181 lb 4.8 oz)   24 79.5 kg (175 lb 4.8 oz)          Data:          Lab Results   Component Value Date     2024     2024     2024    Lab Results   Component Value Date    CHLORIDE 95 2024    CHLORIDE 100 2024    CHLORIDE 102 2024    Lab Results   Component Value Date    BUN 34.3 2024    BUN 79.2 2024    BUN 70.8 2024      Lab Results   Component Value Date    POTASSIUM 3.9 2024    POTASSIUM 3.9 2024    POTASSIUM 3.8 2024    Lab Results   Component Value Date    CO2 24 2024    CO2 21 2024    CO2 20 2024    Lab Results   Component Value Date    CR 2.94 2024    CR 4.91 2024    CR 4.65 2024        Recent Labs   Lab Test 24  0353 24  0709 24  0720   WBC 9.2 7.5 8.1   HGB 10.1* 9.7* 9.2*   HCT 30.9* 30.1* 28.7*   MCV 85 86 86    270 257     No results for input(s): \"AST\", \"ALT\", \"GGT\", \"ALKPHOS\", \"BILITOTAL\", \"BILICONJ\", \"BILIDIRECT\", \"FRACISCO\" in the last 14937 hours.    Invalid input(s): \"BILIRUBININDIRECT\"    Recent Labs   Lab Test 24  1658   MAG 2.1     Recent Labs   Lab Test 24  1658   PHOS 5.1*     Recent Labs   Lab Test 24  0353 24  0709 24  0720   BETH 8.3* 8.3* 8.0*       Lab Results   Component Value Date    BETH 8.3 (L) 2024     Lab Results   Component Value Date    WBC 9.2 2024    HGB 10.1 (L) 2024    HCT 30.9 (L) 2024    MCV 85 2024     2024     Lab Results "   Component Value Date     (L) 06/23/2024    POTASSIUM 3.9 06/23/2024    CHLORIDE 95 (L) 06/23/2024    CO2 24 06/23/2024     (H) 06/23/2024     Lab Results   Component Value Date    BUN 34.3 (H) 06/23/2024    CR 2.94 (H) 06/23/2024     Lab Results   Component Value Date    MAG 2.1 06/19/2024     Lab Results   Component Value Date    PHOS 5.1 (H) 06/19/2024       Creatinine   Date Value Ref Range Status   06/23/2024 2.94 (H) 0.51 - 0.95 mg/dL Final   06/21/2024 4.91 (H) 0.51 - 0.95 mg/dL Final   06/20/2024 4.65 (H) 0.51 - 0.95 mg/dL Final   06/19/2024 4.75 (H) 0.51 - 0.95 mg/dL Final   05/22/2024 4.09 (H) 0.51 - 0.95 mg/dL Final   05/21/2024 4.05 (H) 0.51 - 0.95 mg/dL Final       Attestation:  I have reviewed today's vital signs, notes, medications, labs and imaging.     Priyank Monson MD

## 2024-06-23 NOTE — PLAN OF CARE
"Pt is alert and oriented x4. Pt was given Tylenol for pain. Pt is on Tele monitoring SR with 1st degree AVB. Pt is on blood sugar checks. Pt has BLE edema. Pt is on Hemodialysis. Pt is SBA in transfer and cares. Pt is sleeping in bed. Pt refused bed alarm. Call light within reach.     Goal Outcome Evaluation:      Plan of Care Reviewed With: patient    Overall Patient Progress: improvingOverall Patient Progress: improving    Outcome Evaluation: pt is Tele monitoring. pt is on room air. pt denies SOB.      Problem: Adult Inpatient Plan of Care  Goal: Plan of Care Review  Description: The Plan of Care Review/Shift note should be completed every shift.  The Outcome Evaluation is a brief statement about your assessment that the patient is improving, declining, or no change.  This information will be displayed automatically on your shift  note.  Outcome: Progressing  Flowsheets (Taken 6/23/2024 0318)  Outcome Evaluation: pt is Tele monitoring. pt is on room air. pt denies SOB.  Plan of Care Reviewed With: patient  Overall Patient Progress: improving  Goal: Patient-Specific Goal (Individualized)  Description: You can add care plan individualizations to a care plan. Examples of Individualization might be:  \"Parent requests to be called daily at 9am for status\", \"I have a hard time hearing out of my right ear\", or \"Do not touch me to wake me up as it startles  me\".  Outcome: Progressing  Goal: Absence of Hospital-Acquired Illness or Injury  Outcome: Progressing  Intervention: Identify and Manage Fall Risk  Recent Flowsheet Documentation  Taken 6/23/2024 0007 by Edwar Hickman RN  Safety Promotion/Fall Prevention:   clutter free environment maintained   increased rounding and observation   increase visualization of patient   patient and family education   nonskid shoes/slippers when out of bed   safety round/check completed  Intervention: Prevent Skin Injury  Recent Flowsheet Documentation  Taken 6/23/2024 0007 by " Edwar Hickman RN  Body Position: position changed independently  Intervention: Prevent and Manage VTE (Venous Thromboembolism) Risk  Recent Flowsheet Documentation  Taken 6/23/2024 0007 by Edwar Hickman RN  VTE Prevention/Management: SCDs (sequential compression devices) off  Intervention: Prevent Infection  Recent Flowsheet Documentation  Taken 6/23/2024 0007 by Edwar Hickman RN  Infection Prevention:   single patient room provided   rest/sleep promoted   hand hygiene promoted  Goal: Optimal Comfort and Wellbeing  Outcome: Progressing  Goal: Readiness for Transition of Care  Outcome: Progressing     Problem: Gas Exchange Impaired  Goal: Optimal Gas Exchange  Outcome: Progressing  Intervention: Optimize Oxygenation and Ventilation  Recent Flowsheet Documentation  Taken 6/23/2024 0007 by Edwar Hickman RN  Head of Bed (HOB) Positioning: HOB at 20-30 degrees     Problem: Fall Injury Risk  Goal: Absence of Fall and Fall-Related Injury  Outcome: Progressing  Intervention: Identify and Manage Contributors  Recent Flowsheet Documentation  Taken 6/23/2024 0007 by Edwar Hickman RN  Medication Review/Management: medications reviewed  Intervention: Promote Injury-Free Environment  Recent Flowsheet Documentation  Taken 6/23/2024 0007 by Edwar Hickman RN  Safety Promotion/Fall Prevention:   clutter free environment maintained   increased rounding and observation   increase visualization of patient   patient and family education   nonskid shoes/slippers when out of bed   safety round/check completed     Problem: Comorbidity Management  Goal: Blood Glucose Levels Within Targeted Range  Outcome: Progressing  Intervention: Monitor and Manage Glycemia  Recent Flowsheet Documentation  Taken 6/23/2024 0007 by Edwar Hickman RN  Medication Review/Management: medications reviewed  Goal: Blood Pressure in Desired Range  Outcome: Progressing  Intervention:  Maintain Blood Pressure Management  Recent Flowsheet Documentation  Taken 6/23/2024 0007 by Edwar Hickman RN  Medication Review/Management: medications reviewed     Problem: Heart Failure  Goal: Optimal Coping  Outcome: Progressing  Intervention: Support Psychosocial Response  Recent Flowsheet Documentation  Taken 6/23/2024 0007 by Edwar Hickman RN  Supportive Measures:   self-care encouraged   self-reflection promoted   self-responsibility promoted   active listening utilized  Goal: Optimal Cardiac Output  Outcome: Progressing  Intervention: Optimize Cardiac Output  Recent Flowsheet Documentation  Taken 6/23/2024 0007 by Edwar Hickman RN  Environmental Support: calm environment promoted  Goal: Stable Heart Rate and Rhythm  Outcome: Progressing  Goal: Optimal Functional Ability  Outcome: Progressing  Intervention: Optimize Functional Ability  Recent Flowsheet Documentation  Taken 6/23/2024 0007 by Edwar Hickman RN  Activity Management:   activity adjusted per tolerance   activity encouraged  Goal: Fluid and Electrolyte Balance  Outcome: Progressing  Goal: Improved Oral Intake  Outcome: Progressing  Goal: Effective Oxygenation and Ventilation  Outcome: Progressing  Intervention: Promote Airway Secretion Clearance  Recent Flowsheet Documentation  Taken 6/23/2024 0007 by Edwar Hickman RN  Cough And Deep Breathing: done independently per patient  Activity Management:   activity adjusted per tolerance   activity encouraged  Intervention: Optimize Oxygenation and Ventilation  Recent Flowsheet Documentation  Taken 6/23/2024 0007 by Edwar Hickman RN  Head of Bed (HOB) Positioning: HOB at 20-30 degrees  Goal: Effective Breathing Pattern During Sleep  Outcome: Progressing  Intervention: Monitor and Manage Obstructive Sleep Apnea  Recent Flowsheet Documentation  Taken 6/23/2024 0007 by Edwar Hickman RN  Medication Review/Management: medications  reviewed

## 2024-06-23 NOTE — PLAN OF CARE
"VS elevated at times, improved. Right chest dialysis Cath site C/D/I. Dialysis M-W-F. Plan for dialysis on Wednesday. Went over discharge paperwork wit Pt and Family. Questions asked and answered/. Verbalized understanding. Pt dc'd at 1442 per wc with all belongings,paperwork and medication.         Goal Outcome Evaluation:      Plan of Care Reviewed With: patient, family    Overall Patient Progress: improvingOverall Patient Progress: improving    Outcome Evaluation: Ck RICHARDSON-W-JERRY dc home today      Problem: Adult Inpatient Plan of Care  Goal: Plan of Care Review  Description: The Plan of Care Review/Shift note should be completed every shift.  The Outcome Evaluation is a brief statement about your assessment that the patient is improving, declining, or no change.  This information will be displayed automatically on your shift  note.  Outcome: Progressing  Flowsheets (Taken 6/23/2024 1439)  Outcome Evaluation: Ck RICHARDSON-W-F dc home today  Plan of Care Reviewed With:   patient   family  Overall Patient Progress: improving  Goal: Patient-Specific Goal (Individualized)  Description: You can add care plan individualizations to a care plan. Examples of Individualization might be:  \"Parent requests to be called daily at 9am for status\", \"I have a hard time hearing out of my right ear\", or \"Do not touch me to wake me up as it startles  me\".  Outcome: Progressing  Goal: Absence of Hospital-Acquired Illness or Injury  Outcome: Progressing  Intervention: Identify and Manage Fall Risk  Recent Flowsheet Documentation  Taken 6/23/2024 0800 by Zee Rao, RN  Safety Promotion/Fall Prevention:   activity supervised   lighting adjusted   clutter free environment maintained  Intervention: Prevent Skin Injury  Recent Flowsheet Documentation  Taken 6/23/2024 0800 by Zee Rao, RN  Body Position: position changed independently  Goal: Optimal Comfort and Wellbeing  Outcome: Progressing  Goal: Readiness for Transition of " Care  Outcome: Progressing     Problem: Gas Exchange Impaired  Goal: Optimal Gas Exchange  Outcome: Progressing     Problem: Fall Injury Risk  Goal: Absence of Fall and Fall-Related Injury  Outcome: Progressing  Intervention: Identify and Manage Contributors  Recent Flowsheet Documentation  Taken 6/23/2024 0800 by Zee Rao RN  Medication Review/Management: medications reviewed  Intervention: Promote Injury-Free Environment  Recent Flowsheet Documentation  Taken 6/23/2024 0800 by Zee Rao RN  Safety Promotion/Fall Prevention:   activity supervised   lighting adjusted   clutter free environment maintained     Problem: Comorbidity Management  Goal: Blood Glucose Levels Within Targeted Range  Outcome: Progressing  Intervention: Monitor and Manage Glycemia  Recent Flowsheet Documentation  Taken 6/23/2024 0800 by Zee Rao RN  Medication Review/Management: medications reviewed  Goal: Blood Pressure in Desired Range  Outcome: Progressing  Intervention: Maintain Blood Pressure Management  Recent Flowsheet Documentation  Taken 6/23/2024 0800 by Zee Rao RN  Medication Review/Management: medications reviewed     Problem: Heart Failure  Goal: Optimal Coping  Outcome: Progressing  Goal: Optimal Cardiac Output  Outcome: Progressing  Goal: Stable Heart Rate and Rhythm  Outcome: Progressing  Goal: Optimal Functional Ability  Outcome: Progressing  Intervention: Optimize Functional Ability  Recent Flowsheet Documentation  Taken 6/23/2024 0800 by Zee Rao RN  Activity Management: activity adjusted per tolerance  Goal: Fluid and Electrolyte Balance  Outcome: Progressing  Goal: Improved Oral Intake  Outcome: Progressing  Goal: Effective Oxygenation and Ventilation  Outcome: Progressing  Intervention: Promote Airway Secretion Clearance  Recent Flowsheet Documentation  Taken 6/23/2024 0800 by Zee Rao RN  Activity Management: activity adjusted per tolerance  Goal: Effective Breathing Pattern During Sleep  Outcome:  Progressing  Intervention: Monitor and Manage Obstructive Sleep Apnea  Recent Flowsheet Documentation  Taken 6/23/2024 0800 by Zee Rao, RN  Medication Review/Management: medications reviewed

## 2024-06-25 VITALS — WEIGHT: 181 LBS | BODY MASS INDEX: 33.31 KG/M2 | HEIGHT: 62 IN

## 2024-06-25 NOTE — TELEPHONE ENCOUNTER
SOT KIDNEY INTAKE     PCP: Dr. Vivi Morfin MD  Referring Organization: [facility]  Referring Provider: Remigio Cobian MD  Referring Diagnosis: CKD, stage 5    GFR/Date: 17 (6/23/24)      Is patient diabetic? yes (if not diabetic go to next section)  Is patient on insulin? no  Is patient under the age of 65? no  Was patient offered a pancreas transplant referral? no    Previous transplants: no  Cancer history: skin cancer on knee  Cardiac history: h/o pulmonary hypertension  Biopsy: kidney, right  Oxygen use at rest: no with activity: no    Patient is not currently on dialysis, but will be starting it soon.    BMI: 33.11 (BMI> 40 - RNCC call only/ no PKE date)      Is patient in a group home/assisted living? no  Does patient have a guardian? no    Referral intake process completed.  Patient is aware that after financial approval is received, medical records will be requested.   Patient confirmed for a callback from transplant coordinator on 7/2/24. (within 2 weeks)  Tentative evaluation date 10/3/24 slot 1 .    Confirmed coordinator will discuss evaluation process in more detail at the time of their call.   Patient is aware of the need to arrange age appropriate cancer screening, vaccinations, and dental care.  Reminded patient to complete questionnaire, complete medical records release, and review packet prior to evaluation visit .  Assessed patient for special needs (ie-wheelchair, assistance, guardian, and ): None needed   Patient instructed to call [phone] with questions.     Patient gave verbal consent during intake call to obtain medical records and documents outside of [facility]:  Yes

## 2024-07-02 NOTE — TELEPHONE ENCOUNTER
Reviewed pt's chart for pre-kidney transplant evaluation planning. Pt lives in [place], [place]. Pt has ESRD from presumed HTN/DM2 and nephrotic range proteinuria. Biopsy: None per pt. Pt recently started dialysis at [facility] in [place], [place],  form requested.     Pt is a type 2 diabetic and was diagnosed in her 30s. She said she is not currently using insulin. States her BG average is between 150-190. Most recent A1c was 9.7%.     Other hx includes: Hypothyroidism, HLD, TARA  Heart hx: HTN, had a TTE on 24 showing an EF of 70%, borderline left ventricular hypertrophy and mild aortic stenosis.    Lung status: TARA does not use a CPAP. Recently admitted 2024 for acute hypoxic resp. Failure likely secondary to volume overload due to progressive CKD.    Surgical hx includes 1 , lap cholecystectomy, wisdom teeth and an eye surgery. .      BMI 33 on 2024.  Colonoscopy: Pt states it was done in the last few years at [facility] in [place], will request records.  Dental: UTD, sees regularly per pt.  Last Pap: She is now 64, so will need to see date of last pap smear. She states it was a few years ago.   Mammogram: Was done years ago, will request records..  Pt states she has never smoked, does not consume alcohol, and does not use recreational drugs. Pt is independent w/ ADLs, however her feet are numb which causes her to be unsteady on her feet. She states she can walk one city block.  Pt lives w/ her son and his family and states she has their support following transplant. Pt is unsure if she has any potential living donors.     Smartset orders placed.     Contacted patient and introduced myself as their Transplant Coordinator, also introduced the role of the Transplant Coordinator in the transplant process.  Explained the purpose of this call including reviewing next steps and answering questions.  Confirmed Referring Provider, Dialysis Center, and Primary Care Physician. Notified patient of  the importance of continued communication with referring providers and primary care physicians.  Reviewed components of transplant evaluation process including necessary appointments, tests, and procedures.  Answered all questions for patient regarding evaluation, provided my name and contact information and requested they call with any additional questions. Let the patient know we would be sending them a letter to their MyChart containing the information we went over together during our phone call.     Determined that patient would like additional information regarding transplant by:     Email, Phone Call   Encouraged MyChart     Confirmed STD with patient for 10/03/2024. Scheduled patient's transplant class for 09/26/2024.

## 2024-07-26 ENCOUNTER — HOSPITAL ENCOUNTER (OUTPATIENT)
Facility: CLINIC | Age: 64
Discharge: HOME | End: 2024-07-26
Admitting: RADIOLOGY
Payer: COMMERCIAL

## 2024-07-26 ENCOUNTER — APPOINTMENT (OUTPATIENT)
Dept: INTERVENTIONAL RADIOLOGY/VASCULAR | Facility: CLINIC | Age: 64
End: 2024-07-26
Attending: PHYSICIAN ASSISTANT
Payer: COMMERCIAL

## 2024-07-26 VITALS
WEIGHT: 175 LBS | TEMPERATURE: 98 F | HEART RATE: 68 BPM | DIASTOLIC BLOOD PRESSURE: 64 MMHG | BODY MASS INDEX: 32.2 KG/M2 | OXYGEN SATURATION: 93 % | SYSTOLIC BLOOD PRESSURE: 152 MMHG | HEIGHT: 62 IN | RESPIRATION RATE: 11 BRPM

## 2024-07-26 DIAGNOSIS — N18.6 END STAGE RENAL DISEASE (H): ICD-10-CM

## 2024-07-26 LAB — GLUCOSE BLDC GLUCOMTR-MCNC: 303 MG/DL (ref 70–99)

## 2024-07-26 PROCEDURE — 250N000009 HC RX 250: Performed by: NURSE PRACTITIONER

## 2024-07-26 PROCEDURE — 272N000116 HC CATH CR1

## 2024-07-26 PROCEDURE — 99152 MOD SED SAME PHYS/QHP 5/>YRS: CPT

## 2024-07-26 PROCEDURE — 250N000011 HC RX IP 250 OP 636: Performed by: NURSE PRACTITIONER

## 2024-07-26 PROCEDURE — C1769 GUIDE WIRE: HCPCS

## 2024-07-26 PROCEDURE — 999N000163 HC STATISTIC SIMPLE TUBE INSERTION/CHARGE, PORT, CATH, FISTULOGRAM

## 2024-07-26 PROCEDURE — 82962 GLUCOSE BLOOD TEST: CPT

## 2024-07-26 PROCEDURE — 250N000013 HC RX MED GY IP 250 OP 250 PS 637: Performed by: NURSE PRACTITIONER

## 2024-07-26 PROCEDURE — C1750 CATH, HEMODIALYSIS,LONG-TERM: HCPCS

## 2024-07-26 RX ORDER — NALOXONE HYDROCHLORIDE 0.4 MG/ML
0.2 INJECTION, SOLUTION INTRAMUSCULAR; INTRAVENOUS; SUBCUTANEOUS
Status: DISCONTINUED | OUTPATIENT
Start: 2024-07-26 | End: 2024-07-26 | Stop reason: HOSPADM

## 2024-07-26 RX ORDER — ACETAMINOPHEN 325 MG/1
650 TABLET ORAL
Status: DISCONTINUED | OUTPATIENT
Start: 2024-07-26 | End: 2024-07-26 | Stop reason: HOSPADM

## 2024-07-26 RX ORDER — CEFAZOLIN SODIUM 2 G/100ML
2 INJECTION, SOLUTION INTRAVENOUS
Status: COMPLETED | OUTPATIENT
Start: 2024-07-26 | End: 2024-07-26

## 2024-07-26 RX ORDER — NALOXONE HYDROCHLORIDE 0.4 MG/ML
0.4 INJECTION, SOLUTION INTRAMUSCULAR; INTRAVENOUS; SUBCUTANEOUS
Status: DISCONTINUED | OUTPATIENT
Start: 2024-07-26 | End: 2024-07-26 | Stop reason: HOSPADM

## 2024-07-26 RX ORDER — FENTANYL CITRATE 50 UG/ML
25-50 INJECTION, SOLUTION INTRAMUSCULAR; INTRAVENOUS EVERY 5 MIN PRN
Status: DISCONTINUED | OUTPATIENT
Start: 2024-07-26 | End: 2024-07-26 | Stop reason: HOSPADM

## 2024-07-26 RX ADMIN — FENTANYL CITRATE 25 MCG: 50 INJECTION, SOLUTION INTRAMUSCULAR; INTRAVENOUS at 14:50

## 2024-07-26 RX ADMIN — LIDOCAINE HYDROCHLORIDE 9 ML: 10 INJECTION, SOLUTION INFILTRATION; PERINEURAL at 14:53

## 2024-07-26 RX ADMIN — CEFAZOLIN SODIUM 2 G: 2 INJECTION, SOLUTION INTRAVENOUS at 14:04

## 2024-07-26 RX ADMIN — FENTANYL CITRATE 25 MCG: 50 INJECTION, SOLUTION INTRAMUSCULAR; INTRAVENOUS at 14:38

## 2024-07-26 RX ADMIN — ACETAMINOPHEN 650 MG: 325 TABLET, FILM COATED ORAL at 15:44

## 2024-07-26 ASSESSMENT — ACTIVITIES OF DAILY LIVING (ADL)
ADLS_ACUITY_SCORE: 38

## 2024-07-26 NOTE — DISCHARGE INSTRUCTIONS
Tunnel Cath Insertion/Exchange Discharge Instructions     After you go home:    You may resume your normal diet  Have an adult stay with you for 6 hours       For 24 hours - due to the sedation you received:  Relax and take it easy  Do NOT make any important or legal decisions  Do NOT drive or operate machines at home or at work  Do NOT drink alcohol    Care of Puncture Site:    Keep the dressing clean & dry  Check the site twice a day for signs of infection  Do not swim while you have the tunnel catheter (to prevent infection)  If you shower, place a waterproof cover over the dressing (such as plastic wrap)  You may take a bath if the water stays below your waist. Sponge bathe your upper body to keep the dressing from getting wet    Caring for the Catheter:    Check the skin around the tube each day for redness, swelling, drainage or tenderness. These are all signs of infection  Take your temperature daily    If the catheter (tubing) breaks or leaks:     Place the blue emergency clamp between the break and your insertion site on your skin  If you don t have a clamp, fold or pinch off the tubing above the hole or break in the catheter (closest to your skin)  Call the phone number listed below immediately & press option 3 to be connected to Interventional Radiology    If you have chest pain, shortness of breath, or feel faint:    Make sure end caps are securely tightened  Look for a hole or leaking in the catheter  Put the blue emergency clamp on the catheter (tubing) above the hole or break in the catheter as close to the skin as you can  Remain calm and call 911. Explain your symptoms and say that you have a central line tunnel catheter in place  Lie on your left side    Check your catheter every day:    Make sure the clamps are closed over both ends of the tubing  Check that the caps are tight  If a cap comes off, you may have bleeding from the tube, or air could get into the bloodstream.  Wrap the end of the tube  in a clean gauze and call your provider or dialysis unit immediately  Your catheter is not likely to fall out. It is secured with stitches to your skin.  Also, a small cuff under the skin helps to hold the catheter in place.  If the catheter does fall out, put firm pressure on the exit site with a clean gauze & seek medical attention immediately    Activity:     You may go back to normal activity in 24 hours   Avoid heavy lifting (greater than 10 pounds), strenuous activity or the overuse of your shoulder for 3 days    Bleeding:    If you start bleeding from the incision sites in your chest or neck - or have swelling in your neck, sit down and press on the site for 5-10 minutes  If bleeding has not stopped after 10 minutes, call your provider        Call 911 right away if you have heavy bleeding or bleeding that does not stop.      Medicines:    You may resume all medications  Resume your Warfarin/Coumadin at your regular dose today. Follow up with your provider to have your INR rechecked  Resume your Platelet Inhibitors and Aspirin tomorrow at your regular dose  For minor pain, you may take Acetaminophen (Tylenol) or Ibuprofen (Advil)                 Call the provider who ordered this procedure if:    The site is red, swollen, hot or tender or there is drainage at the site  You have chills or a fever greater than 100 F (37.8 C)  A cap comes off  The catheter falls out  Any questions or concerns    Call  911 or go to the Emergency Room if you have:    Trouble breathing  Chest or shoulder pain not related to procedure  Bleeding that you cannot control      If you have questions call:          [facility] Radiology Dept @ [phone]    Or you can contact your provider via My Chart

## 2024-07-26 NOTE — IR NOTE
Interventional Radiology Intra-procedural Nursing Note    Patient Name: Nisreen Mckenna  Medical Record Number: [id number]  Today's Date: July 26, 2024    Procedure: Right Tunneled Dialysis Catheter Replacement with Fentanyl  Start time: 1445  End time: 1458  Report provided to: Rozina PAIGE    Note: Patient entered Interventional Radiology Suite number 2 via cart. Patient awake, alert and orientated. Assisted onto procedural table in supine position. Prepped and draped.  Dr. Martel in room. Time out and procedure started. Vital signs stable. Telemetry reading NSR.    Procedure well tolerated by patient without complications. Procedure end with debrief by Dr. Martel.      Administered medication totals:  Lidocaine 1% 9 mL Intradermal  Fentanyl 50 mcg IVP    Last dose of sedation administered at 1450.

## 2024-07-26 NOTE — PROGRESS NOTES
Care Suites Admission Nursing Note    Patient Information  Name: Nisreen Mckenna  Age: 64 year old  Reason for admission: Tunnel cath re-insert  Care Suites arrival time: 1325    Visitor Information  Name: Dixie     Patient Admission/Assessment   Pre-procedure assessment complete: Yes  If abnormal assessment/labs, provider notified: Blood glucose of 303 reported to Karlie QUIÑONES NP, no new orders.  NPO: No, explain -add on procedure for today  Medications held per instructions/orders: N/A  Consent: declined  If applicable, pregnancy test status: deferred  Patient oriented to room: Yes  Education/questions answered: Yes  Plan/other:   AVS reviewed with pt and daughter in law.  Pt last dialysis on Wednesday, will go to dialysis after procedure today.  Discharge Planning  Discharge name/phone number: Dixie here with pt today  Overnight post sedation caregiver: Dixie [phone]  Discharge location: home    Tete Cummins RN

## 2024-07-26 NOTE — PROGRESS NOTES
Care Suites Discharge Nursing Note    Patient Information  Name: Nisreen Mckenna  Age: 64 year old    Discharge Education:  Discharge instructions reviewed: Yes  Additional education/resources provided: NA  Patient/patient representative verbalizes understanding: Yes  Patient discharging on new medications: No  Medication education completed: N/A    Discharge Plans:   Discharge location: home  Discharge ride contacted: Yes  Approximate discharge time: 1620    Discharge Criteria:  Discharge criteria met and vital signs stable: Yes    Patient Belongs:  Patient belongings returned to patient: Yes    Rozina rAanda RN

## 2024-07-26 NOTE — PRE-PROCEDURE
GENERAL PRE-PROCEDURE:   Procedure:  Right tunneled dialysis catheter replacement with Fentanyl  Date/Time:  7/26/2024 1:38 PM    Written consent obtained?: Yes    Risks and benefits: Risks, benefits and alternatives were discussed    Consent given by:  Patient  Patient states understanding of procedure being performed: Yes    Patient's understanding of procedure matches consent: Yes    Procedure consent matches procedure scheduled: Yes    Expected level of sedation:  Moderate  Appropriately NPO:  Yes  ASA Class:  3  Mallampati  :  Grade 3- soft palate visible, posterior pharyngeal wall not visible  Lungs:  Lungs clear with good breath sounds bilaterally  Heart:  Normal heart sounds and rate and systolic murmur  History & Physical reviewed:  History and physical reviewed and no updates needed  Statement of review:  I have reviewed the lab findings, diagnostic data, medications, and the plan for sedation    Total time: 20 minutes    Thanks [facility] Interventional Radiology CNP ([phone]) (phone [phone])

## 2024-08-13 ENCOUNTER — DOCUMENTATION ONLY (OUTPATIENT)
Dept: TRANSPLANT | Facility: CLINIC | Age: 64
End: 2024-08-13
Payer: COMMERCIAL

## 2024-08-29 DIAGNOSIS — Z99.2 ESRD (END STAGE RENAL DISEASE) ON DIALYSIS (H): Primary | ICD-10-CM

## 2024-08-29 DIAGNOSIS — T82.898A PROBLEM WITH DIALYSIS ACCESS, INITIAL ENCOUNTER (H): ICD-10-CM

## 2024-08-29 DIAGNOSIS — N18.6 ESRD (END STAGE RENAL DISEASE) ON DIALYSIS (H): Primary | ICD-10-CM

## 2024-09-03 ENCOUNTER — HOSPITAL ENCOUNTER (OUTPATIENT)
Facility: CLINIC | Age: 64
Discharge: HOME | End: 2024-09-03
Admitting: RADIOLOGY
Payer: COMMERCIAL

## 2024-09-03 ENCOUNTER — APPOINTMENT (OUTPATIENT)
Dept: INTERVENTIONAL RADIOLOGY/VASCULAR | Facility: CLINIC | Age: 64
End: 2024-09-03
Attending: PHYSICIAN ASSISTANT
Payer: COMMERCIAL

## 2024-09-03 VITALS
HEIGHT: 62 IN | DIASTOLIC BLOOD PRESSURE: 48 MMHG | TEMPERATURE: 97.9 F | RESPIRATION RATE: 17 BRPM | SYSTOLIC BLOOD PRESSURE: 146 MMHG | HEART RATE: 63 BPM | WEIGHT: 180 LBS | BODY MASS INDEX: 33.13 KG/M2

## 2024-09-03 DIAGNOSIS — T82.898A PROBLEM WITH DIALYSIS ACCESS, INITIAL ENCOUNTER (H): ICD-10-CM

## 2024-09-03 DIAGNOSIS — N18.6 ESRD (END STAGE RENAL DISEASE) ON DIALYSIS (H): ICD-10-CM

## 2024-09-03 DIAGNOSIS — Z99.2 ESRD (END STAGE RENAL DISEASE) ON DIALYSIS (H): ICD-10-CM

## 2024-09-03 PROCEDURE — 999N000163 HC STATISTIC SIMPLE TUBE INSERTION/CHARGE, PORT, CATH, FISTULOGRAM

## 2024-09-03 PROCEDURE — 36589 REMOVAL TUNNELED CV CATH: CPT

## 2024-09-03 RX ORDER — ACETAMINOPHEN 325 MG/1
650 TABLET ORAL
Status: DISCONTINUED | OUTPATIENT
Start: 2024-09-03 | End: 2024-09-03 | Stop reason: HOSPADM

## 2024-09-03 ASSESSMENT — ACTIVITIES OF DAILY LIVING (ADL): ADLS_ACUITY_SCORE: 38

## 2024-09-03 NOTE — PRE-PROCEDURE
GENERAL PRE-PROCEDURE:   Procedure:  Tunneled dialysis catheter removal with local  Date/Time:  9/3/2024 8:47 AM    Written consent obtained?: Yes    Risks and benefits: Risks, benefits and alternatives were discussed    DC Plan: Appropriate discharge home plan in place for patients who are going home after procedure   Consent given by:  Patient  Patient states understanding of procedure being performed: Yes    Patient's understanding of procedure matches consent: Yes    Procedure consent matches procedure scheduled: Yes    Appropriately NPO:  Yes    Total time: 15 minutes    Thanks [facility] Interventional Radiology CNP ([phone]) (phone [phone])

## 2024-09-03 NOTE — DISCHARGE INSTRUCTIONS
Tunnel Cath Removal Discharge Instructions     After you go home:    You may resume your normal diet    Care of Puncture Site:    Keep the dressing clean & dry for 3 days  You may use a bandaid on the site after 3 days until the wound has healed  No tub baths, whirlpools or swimming until your puncture site has fully healed     Activity     You may go back to normal activity in 24 hours   Avoid heavy lifting (greater than 10 pounds), strenuous activity or the overuse of your shoulder for 3 days    Bleeding:    If you start bleeding from the incision site in your chest or have swelling in your neck, sit down and press on the site for 5-10 minutes.   If bleeding has not stopped after 10 minutes, call your provider.        Call 911 right away if you have heavy bleeding or bleeding that does not stop.      Medicines:    You may resume all your medicines today  For minor pain, you may take Acetaminophen (Tylenol) or Ibuprofen (Advil)                 Call the provider who ordered this procedure if:    The site is red, swollen, hot or tender or there is drainage at the site  You have chills or a fever greater than 100 F (37.8 C)  Swelling in your neck  Any questions or concerns      If you have questions call:          [facility] Radiology Dept @ [phone]    Or you can contact your provider via My Chart

## 2024-09-03 NOTE — IR NOTE
Interventional Radiology Intra-procedural Nursing Note    Patient Name: Nisreen Mckenna  Medical Record Number: [id number]  Today's Date: September 3, 2024    Procedure: Tunneled dialysis catheter removal    Start time: 0914  End time: 0915  Report provided to: Stephanie KWAN RN  Patient depart time and location: 0922 to  Room 9    Note: Patient entered Interventional Radiology Suite number 2 via cart. Patient awake, alert and oriented. Assisted onto procedural table in supine position. Prepped and draped.  Dr. Martel in room. Time out and procedure started.   Procedure well tolerated by patient without complications. Procedure end with debrief by Dr. Martel.    Manual pressure applied until hemostasis achieved at 0920. Quick clot and tegaderm dressing applied to right interventional procedure access site, dressing is c/d/I.

## 2024-09-03 NOTE — PROGRESS NOTES
Care Suites Discharge Nursing Note    Patient Information  Name: Nisreen Mckenna  Age: 64 year old    Discharge Education:  Discharge instructions reviewed: Yes  Additional education/resources provided: none  Patient/patient representative verbalizes understanding: Yes  Patient discharging on new medications: No  Medication education completed: N/A    Discharge Plans:   Discharge location: home  Discharge ride contacted: N/A  Approximate discharge time: 0945    Discharge Criteria:  Discharge criteria met and vital signs stable: Yes  Dressing to site CDI, no pain.    Patient Belongs:  Patient belongings returned to patient: N/A    Tete Cummins RN

## 2024-09-17 ENCOUNTER — DOCUMENTATION ONLY (OUTPATIENT)
Dept: TRANSPLANT | Facility: CLINIC | Age: 64
End: 2024-09-17
Payer: COMMERCIAL

## 2024-09-26 ENCOUNTER — TELEPHONE (OUTPATIENT)
Dept: TRANSPLANT | Facility: CLINIC | Age: 64
End: 2024-09-26
Payer: COMMERCIAL

## 2024-09-26 ENCOUNTER — VIRTUAL VISIT (OUTPATIENT)
Dept: TRANSPLANT | Facility: CLINIC | Age: 64
End: 2024-09-26
Payer: COMMERCIAL

## 2024-09-26 DIAGNOSIS — Z76.82 ORGAN TRANSPLANT CANDIDATE: Primary | ICD-10-CM

## 2024-09-26 NOTE — GROUP NOTE
Date: 9/26/2024    Scheduled Start Time:  9:30 AM   Scheduled End Time: 10:30 AM    Department: [facility]    Facilitator(s): Claudine Green, RN    Documentation:  Solid Organ Transplant Education      Patient attended the pre-transplant patient education class today. The [facility] website pre-transplant modules were viewed:     Content reviewed:  Living Donation and how to access that program  Paired exchange  Kidney Donor Profile Index (KDPI)  Waiting list issues (right to decline without penalty, high PHS risk donors, what to expect when called with an offer)  Hepatitis C Donor Acceptance education and risks  Hospital experience, length of stay, need to stay locally post-discharge (2-4 weeks)    Surgical complications with video                      Post-surgery lifting and driving restrictions  Post-transplant routines, frequency of lab work and clinic visits  Role of Transplant Coordinator    Participants were informed of the benefits of transplant as well as potential risks such as infection, cancer, and death. The need for total adherence with immunosuppression medications and following transplant regimens was stressed.  The overall evaluation/approval/listing process was reviewed.           Patient:   Nisreen Mckenna    Transplant Episode(s):  Kidney Transplant Referral - 6/11/2024

## 2024-09-26 NOTE — TELEPHONE ENCOUNTER
Patient called as she had questions regarding her upcoming PKE. All her questions were answered and she has my direct number for any further questions.

## 2024-10-02 PROBLEM — E11.9 DIABETES MELLITUS, TYPE 2 (H): Status: ACTIVE | Noted: 2024-10-02

## 2024-10-02 LAB
ABO/RH(D): NORMAL
ANTIBODY SCREEN: NEGATIVE
SPECIMEN EXPIRATION DATE: NORMAL

## 2024-10-03 ENCOUNTER — ALLIED HEALTH/NURSE VISIT (OUTPATIENT)
Dept: TRANSPLANT | Facility: CLINIC | Age: 64
End: 2024-10-03
Payer: COMMERCIAL

## 2024-10-03 ENCOUNTER — DOCUMENTATION ONLY (OUTPATIENT)
Dept: LAB | Facility: CLINIC | Age: 64
End: 2024-10-03

## 2024-10-03 ENCOUNTER — LAB (OUTPATIENT)
Dept: LAB | Facility: CLINIC | Age: 64
End: 2024-10-03
Attending: NURSE PRACTITIONER
Payer: COMMERCIAL

## 2024-10-03 ENCOUNTER — ANCILLARY PROCEDURE (OUTPATIENT)
Dept: GENERAL RADIOLOGY | Facility: CLINIC | Age: 64
End: 2024-10-03
Attending: NURSE PRACTITIONER
Payer: COMMERCIAL

## 2024-10-03 ENCOUNTER — APPOINTMENT (OUTPATIENT)
Dept: TRANSPLANT | Facility: CLINIC | Age: 64
End: 2024-10-03
Attending: NURSE PRACTITIONER
Payer: COMMERCIAL

## 2024-10-03 ENCOUNTER — ANCILLARY PROCEDURE (OUTPATIENT)
Dept: CARDIOLOGY | Facility: CLINIC | Age: 64
End: 2024-10-03
Attending: NURSE PRACTITIONER
Payer: COMMERCIAL

## 2024-10-03 VITALS
HEART RATE: 65 BPM | DIASTOLIC BLOOD PRESSURE: 62 MMHG | HEIGHT: 62 IN | WEIGHT: 187.9 LBS | SYSTOLIC BLOOD PRESSURE: 155 MMHG | OXYGEN SATURATION: 98 % | BODY MASS INDEX: 34.58 KG/M2

## 2024-10-03 DIAGNOSIS — D63.1 ANEMIA IN CHRONIC KIDNEY DISEASE: ICD-10-CM

## 2024-10-03 DIAGNOSIS — N18.9 ANEMIA IN CHRONIC KIDNEY DISEASE: ICD-10-CM

## 2024-10-03 DIAGNOSIS — Z79.4 TYPE 2 DIABETES MELLITUS WITH DIABETIC NEPHROPATHY, WITH LONG-TERM CURRENT USE OF INSULIN (H): ICD-10-CM

## 2024-10-03 DIAGNOSIS — Z76.82 ORGAN TRANSPLANT CANDIDATE: Primary | ICD-10-CM

## 2024-10-03 DIAGNOSIS — Z01.818 ENCOUNTER FOR PRE-TRANSPLANT EVALUATION FOR KIDNEY TRANSPLANT: ICD-10-CM

## 2024-10-03 DIAGNOSIS — N18.5 CHRONIC KIDNEY DISEASE, STAGE 5, KIDNEY FAILURE (H): ICD-10-CM

## 2024-10-03 DIAGNOSIS — E11.21 TYPE 2 DIABETES MELLITUS WITH DIABETIC NEPHROPATHY, WITH LONG-TERM CURRENT USE OF INSULIN (H): ICD-10-CM

## 2024-10-03 DIAGNOSIS — G47.33 OSA (OBSTRUCTIVE SLEEP APNEA): ICD-10-CM

## 2024-10-03 DIAGNOSIS — N18.6 ESRD (END STAGE RENAL DISEASE) (H): Primary | ICD-10-CM

## 2024-10-03 DIAGNOSIS — Z76.82 ORGAN TRANSPLANT CANDIDATE: ICD-10-CM

## 2024-10-03 DIAGNOSIS — E03.9 HYPOTHYROIDISM, UNSPECIFIED TYPE: ICD-10-CM

## 2024-10-03 DIAGNOSIS — N18.6 ESRD (END STAGE RENAL DISEASE) (H): ICD-10-CM

## 2024-10-03 LAB
A1 AB TITR SERPL: 8 {TITER}
A1 AB TITR SERPL: 8 {TITER}
A2 AB TITR SERPL: 1 {TITER}
A2 AB TITR SERPL: 2 {TITER}
ABO/RH(D): NORMAL
ALBUMIN SERPL BCG-MCNC: 3.8 G/DL (ref 3.5–5.2)
ALBUMIN UR-MCNC: 300 MG/DL
ALP SERPL-CCNC: 89 U/L (ref 40–150)
ALT SERPL W P-5'-P-CCNC: 18 U/L (ref 0–50)
ANION GAP SERPL CALCULATED.3IONS-SCNC: 16 MMOL/L (ref 7–15)
ANTIBODY TITER IGM SCREEN: NEGATIVE
APPEARANCE UR: CLEAR
APTT PPP: 27 SECONDS (ref 22–38)
AST SERPL W P-5'-P-CCNC: 19 U/L (ref 0–45)
BASOPHILS # BLD AUTO: 0.1 10E3/UL (ref 0–0.2)
BASOPHILS NFR BLD AUTO: 1 %
BILIRUB SERPL-MCNC: 0.2 MG/DL
BILIRUB UR QL STRIP: NEGATIVE
BUN SERPL-MCNC: 81.7 MG/DL (ref 8–23)
CALCIUM SERPL-MCNC: 9.8 MG/DL (ref 8.8–10.4)
CHLORIDE SERPL-SCNC: 97 MMOL/L (ref 98–107)
COLOR UR AUTO: ABNORMAL
CREAT SERPL-MCNC: 4.87 MG/DL (ref 0.51–0.95)
EGFRCR SERPLBLD CKD-EPI 2021: 9 ML/MIN/1.73M2
EOSINOPHIL # BLD AUTO: 0.3 10E3/UL (ref 0–0.7)
EOSINOPHIL NFR BLD AUTO: 3 %
ERYTHROCYTE [DISTWIDTH] IN BLOOD BY AUTOMATED COUNT: 15 % (ref 10–15)
EST. AVERAGE GLUCOSE BLD GHB EST-MCNC: 232 MG/DL
GLUCOSE SERPL-MCNC: 195 MG/DL (ref 70–99)
GLUCOSE UR STRIP-MCNC: 150 MG/DL
HBA1C MFR BLD: 9.7 %
HBV CORE AB SERPL QL IA: NONREACTIVE
HBV SURFACE AB SERPL IA-ACNC: <3.5 M[IU]/ML
HBV SURFACE AB SERPL IA-ACNC: NONREACTIVE M[IU]/ML
HBV SURFACE AG SERPL QL IA: NONREACTIVE
HCO3 SERPL-SCNC: 23 MMOL/L (ref 22–29)
HCT VFR BLD AUTO: 33.3 % (ref 35–47)
HCV AB SERPL QL IA: NONREACTIVE
HGB BLD-MCNC: 11.3 G/DL (ref 11.7–15.7)
HGB UR QL STRIP: ABNORMAL
HIV 1+2 AB+HIV1 P24 AG SERPL QL IA: NONREACTIVE
IMM GRANULOCYTES # BLD: 0 10E3/UL
IMM GRANULOCYTES NFR BLD: 0 %
INR PPP: 0.95 (ref 0.85–1.15)
KETONES UR STRIP-MCNC: NEGATIVE MG/DL
LEUKOCYTE ESTERASE UR QL STRIP: NEGATIVE
LVEF ECHO: NORMAL
LYMPHOCYTES # BLD AUTO: 1.3 10E3/UL (ref 0.8–5.3)
LYMPHOCYTES NFR BLD AUTO: 13 %
MCH RBC QN AUTO: 29.9 PG (ref 26.5–33)
MCHC RBC AUTO-ENTMCNC: 33.9 G/DL (ref 31.5–36.5)
MCV RBC AUTO: 88 FL (ref 78–100)
MONOCYTES # BLD AUTO: 0.7 10E3/UL (ref 0–1.3)
MONOCYTES NFR BLD AUTO: 7 %
MUCOUS THREADS #/AREA URNS LPF: PRESENT /LPF
NEUTROPHILS # BLD AUTO: 7.6 10E3/UL (ref 1.6–8.3)
NEUTROPHILS NFR BLD AUTO: 76 %
NITRATE UR QL: NEGATIVE
NRBC # BLD AUTO: 0 10E3/UL
NRBC BLD AUTO-RTO: 0 /100
PH UR STRIP: 6 [PH] (ref 5–7)
PLATELET # BLD AUTO: 245 10E3/UL (ref 150–450)
POTASSIUM SERPL-SCNC: 4.9 MMOL/L (ref 3.4–5.3)
PROT SERPL-MCNC: 7.4 G/DL (ref 6.4–8.3)
RBC # BLD AUTO: 3.78 10E6/UL (ref 3.8–5.2)
RBC URINE: 1 /HPF
SODIUM SERPL-SCNC: 136 MMOL/L (ref 135–145)
SP GR UR STRIP: 1.02 (ref 1–1.03)
SPECIMEN EXPIRATION DATE: NORMAL
SPECIMEN EXPIRATION DATE: NORMAL
SQUAMOUS EPITHELIAL: 1 /HPF
T PALLIDUM AB SER QL: NONREACTIVE
UROBILINOGEN UR STRIP-MCNC: NORMAL MG/DL
WBC # BLD AUTO: 10 10E3/UL (ref 4–11)
WBC URINE: 19 /HPF

## 2024-10-03 PROCEDURE — 81240 F2 GENE: CPT | Performed by: NURSE PRACTITIONER

## 2024-10-03 PROCEDURE — 81001 URINALYSIS AUTO W/SCOPE: CPT | Performed by: PATHOLOGY

## 2024-10-03 PROCEDURE — G0463 HOSPITAL OUTPT CLINIC VISIT: HCPCS

## 2024-10-03 PROCEDURE — 86901 BLOOD TYPING SEROLOGIC RH(D): CPT

## 2024-10-03 PROCEDURE — 86481 TB AG RESPONSE T-CELL SUSP: CPT | Performed by: NURSE PRACTITIONER

## 2024-10-03 PROCEDURE — 86900 BLOOD TYPING SEROLOGIC ABO: CPT

## 2024-10-03 PROCEDURE — 81382 HLA II TYPING 1 LOC HR: CPT | Performed by: NURSE PRACTITIONER

## 2024-10-03 PROCEDURE — 86803 HEPATITIS C AB TEST: CPT | Performed by: NURSE PRACTITIONER

## 2024-10-03 PROCEDURE — 83036 HEMOGLOBIN GLYCOSYLATED A1C: CPT | Performed by: NURSE PRACTITIONER

## 2024-10-03 PROCEDURE — 86832 HLA CLASS I HIGH DEFIN QUAL: CPT | Performed by: NURSE PRACTITIONER

## 2024-10-03 PROCEDURE — 86644 CMV ANTIBODY: CPT | Performed by: NURSE PRACTITIONER

## 2024-10-03 PROCEDURE — 86780 TREPONEMA PALLIDUM: CPT | Performed by: NURSE PRACTITIONER

## 2024-10-03 PROCEDURE — 85730 THROMBOPLASTIN TIME PARTIAL: CPT | Performed by: PATHOLOGY

## 2024-10-03 PROCEDURE — 85730 THROMBOPLASTIN TIME PARTIAL: CPT | Performed by: NURSE PRACTITIONER

## 2024-10-03 PROCEDURE — 99000 SPECIMEN HANDLING OFFICE-LAB: CPT | Performed by: PATHOLOGY

## 2024-10-03 PROCEDURE — 99205 OFFICE O/P NEW HI 60 MIN: CPT | Performed by: NURSE PRACTITIONER

## 2024-10-03 PROCEDURE — 85670 THROMBIN TIME PLASMA: CPT | Performed by: NURSE PRACTITIONER

## 2024-10-03 PROCEDURE — 86850 RBC ANTIBODY SCREEN: CPT

## 2024-10-03 PROCEDURE — 71046 X-RAY EXAM CHEST 2 VIEWS: CPT | Performed by: RADIOLOGY

## 2024-10-03 PROCEDURE — 36415 COLL VENOUS BLD VENIPUNCTURE: CPT | Performed by: PATHOLOGY

## 2024-10-03 PROCEDURE — 86704 HEP B CORE ANTIBODY TOTAL: CPT | Performed by: NURSE PRACTITIONER

## 2024-10-03 PROCEDURE — 87340 HEPATITIS B SURFACE AG IA: CPT | Performed by: NURSE PRACTITIONER

## 2024-10-03 PROCEDURE — 85610 PROTHROMBIN TIME: CPT | Performed by: PATHOLOGY

## 2024-10-03 PROCEDURE — G0463 HOSPITAL OUTPT CLINIC VISIT: HCPCS | Performed by: SURGERY

## 2024-10-03 PROCEDURE — G0452 MOLECULAR PATHOLOGY INTERPR: HCPCS | Mod: 26 | Performed by: PATHOLOGY

## 2024-10-03 PROCEDURE — 86787 VARICELLA-ZOSTER ANTIBODY: CPT | Performed by: NURSE PRACTITIONER

## 2024-10-03 PROCEDURE — 80053 COMPREHEN METABOLIC PANEL: CPT | Performed by: PATHOLOGY

## 2024-10-03 PROCEDURE — 87186 SC STD MICRODIL/AGAR DIL: CPT | Performed by: NURSE PRACTITIONER

## 2024-10-03 PROCEDURE — 99204 OFFICE O/P NEW MOD 45 MIN: CPT | Performed by: SURGERY

## 2024-10-03 PROCEDURE — 85390 FIBRINOLYSINS SCREEN I&R: CPT | Mod: 26 | Performed by: PATHOLOGY

## 2024-10-03 PROCEDURE — 93306 TTE W/DOPPLER COMPLETE: CPT | Performed by: INTERNAL MEDICINE

## 2024-10-03 PROCEDURE — 86147 CARDIOLIPIN ANTIBODY EA IG: CPT | Performed by: NURSE PRACTITIONER

## 2024-10-03 PROCEDURE — 84681 ASSAY OF C-PEPTIDE: CPT | Performed by: NURSE PRACTITIONER

## 2024-10-03 PROCEDURE — 86833 HLA CLASS II HIGH DEFIN QUAL: CPT | Performed by: NURSE PRACTITIONER

## 2024-10-03 PROCEDURE — 86706 HEP B SURFACE ANTIBODY: CPT | Performed by: NURSE PRACTITIONER

## 2024-10-03 PROCEDURE — 99207 PR NO BILLABLE SERVICE THIS VISIT: CPT

## 2024-10-03 PROCEDURE — 86886 COOMBS TEST INDIRECT TITER: CPT

## 2024-10-03 PROCEDURE — 85025 COMPLETE CBC W/AUTO DIFF WBC: CPT | Performed by: PATHOLOGY

## 2024-10-03 PROCEDURE — 86665 EPSTEIN-BARR CAPSID VCA: CPT | Performed by: NURSE PRACTITIONER

## 2024-10-03 NOTE — PROGRESS NOTES
[facility] SOLID ORGAN TRANSPLANT  OUTPATIENT MNT: KIDNEY TRANSPLANT EVALUATION    Current BMI: 34.37 (HT 62 in, .14 lbs/85.2 kg)  BMI guideline for kidney transplant up to a BMI of 40 / per surgeon discretion     Frailty Assessment-- Not Frail (2/5). Decreased  strength and slowness on 15 ft walk  Handgrip Strength: 8.67    Reference:  Score of 0-2 = Not Frail  Score of 3-5 = Frail     FraiLT-- Frail (LFI: 5.32) - unable to complete tandem balance, decreased    [email or web address]  Reference Range  Robust <3.2  Pre Frail 3.2-4.3  Frail >4.4    Recommendations:  None at this time      TIME SPENT: 30 minutes  VISIT TYPE: Initial Assessment   REFERRING PHYSICIAN: Remigio Cobian MD   PT ACCOMPANIED BY: self     History of previous txp: no   Dialysis: PD - overnight    NUTRITION ASSESSMENT  Follows: lower Na and lower K+ - no fluid restriction   Does not follow a low phos diet. Has questions about what foods are lower in phos   - Meal prep & grocery shopping: yes  - Previous RD education: yes (renal diet education)  - Appetite: good  - Food allergies/intolerances: no  - Issues chewing or swallowing: no  - N/V/D/C: constipation - takes Miralax   - Food access concerns: no     Vitamins, Supplements, Pertinent Meds: Lantus PEN, Ferrous Sulfate, Bumex, Lipitor, Renal MVI   Herbal Medicines/Supplements: no   Protein Supplements: protein powder (mixes in milk if has cereal)    Weight hx // fluid retention:   - + DAVI  - weight gain - likely fluid   pound     PHYSICAL ACTIVITY   Minimal - unsteady with numbness in feet (will be seeing someone about shoes to help with balance)     DIET RECALL  Breakfast Eggs, toast   Lunch Limited    Dinner Hamburger gravy (mushrooms, hamburger, white sauce) with potatoes.    Snacks Cottage cheese, fruit   Beverages Coffee, water   Alcohol None    Dining out None - too salty      LABS  (5/20/24): HA1C 9.7% (H)  (10/1): K+ 4.8 mmol/L    NUTRITION  DIAGNOSIS   No nutrition diagnosis identified at this time     NUTRITION INTERVENTION   Nutrition education provided:  Discussed sodium intake (low sodium foods and drinks, seasoning food without salt and tips for low sodium diet).  Discussed high Phosphorus food items and high protein foods. Handouts sent in Wrapup - Patient reported she will activate Mychart.     Reviewed post txp diet guidelines in brief (will review in further detail post txp):  (1) Review of proper food safety measures d/t immunosuppressant therapy post-op and increased risk for food-borne illness    (2) Avoid the following post txp d/t risk for rejection, unknown effects on the organs, and/or potential interactions with immunosuppressants:  - Herbal, Chinese, holistic, chiropractic, natural, alternative medicines and supplements  - Detoxes and cleanses  - Weight loss pills  - Protein powders or other products with extracts or herbs (ie green tea extract)    (3) Med regimen and possible side effects    Patient Understanding: Pt verbalized understanding of education provided.  Expected Engagement: good  Follow-Up Plans: PRN     NUTRITION GOALS   No nutrition goals identified at this time       Germaine Arango MS/RD/LD/CNSC

## 2024-10-03 NOTE — PATIENT INSTRUCTIONS
Higher Phosphorus Foods  Eat more or less of the following (based on your phosphorus lab values):    Milk and milk products (including cottage cheese, ice cream, yogurt, cheese, custard, and pudding), whole-grain breads, bran, whole-grain cereal, wheat germ, hennessy's yeast, legumes (dried or canned beans, baked beans, split peas, lentils), nuts, nut butters (peanut or almond butter), seeds, chocolate and cocoa, cola (Coke, Pepsi, Dr Pepper), beer, other beverages (punch, sweetened/flavored tea), soy products, and processed/packaged/convenience foods with added phosphorus*    *Of note: synthetic phosphorus (in processed/packaged foods) is absorbed 100% by our bodies vs naturally occurring phosphorus (in nuts, dairy, beans, whole grains) is absorbed only 30% by our bodies. This means that packaged/processed foods impact our phosphorus levels the most!    Sources of Protein  Below is a list of high protein foods.   For animal proteins (chicken, beef, fish), deck of cards size is approximately 3 ounces or 24 grams protein.  Food Portion  Grams of Protein   Animal proteins (chicken, turkey, venison, fish/seafood, red meat) 1 ounce  7-9   Egg  1  6   Cottage cheese  1/4 cup  7    Cheese  1 ounce (1 slice, 1 cheese stick) 6   Milk (cow's) 4 ounces or 1/2 cup  4   Dry skim milk powder 2 Tbsp  4   Ricotta cheese  1/4 cup  7    [place] Instant Breakfast (made with milk) 1/2 cup  7   Pudding 1/2 cup  4   Yogurt (ie Yoplait) 1/2 cup  5   Greek yogurt 5 oz container 15   Tofu  1/4 cup  5   Soymilk  1/2 cup  3   Tempeh  1/4 cup  8   Lentils  1/4 cup 5   Kidney beans, black beans, etc 1/4 cup  4   Chickpeas 1/4 cup  4   Veggie burger  1 soraya  7   Soy nuts  1/4 cup  17   Sunflower seeds  2 Tbsp  8   Peanuts  1 ounce 7   Almonds  15 6   Pistachios 25 6   Peanut/almond butter 2 Tbsp  8      Protein bars, ready-to-drink products (ie Premier Protein, Boost, Ensure, etc), or protein powders are other options to supplement your  protein intake.

## 2024-10-03 NOTE — PROGRESS NOTES
"Psychosocial Assessment For Kidney Transplantation  Patient Name/ Age: Nisreen Mckenna 64 year old   Medical Record #: [id number]  Duration of Interview:     30 min  Process:   Face-to-Face Interview                (counseling < 50%)   Present at Appointment: Kerry was alone.        : KHANH Villela [facility] Date:  October 3, 2024        Type of transplant: Kidney     Donor type:      Cadaver. Pt reports her son, Geovanni has offered to donate but she doesn't want him to have to do that.    Prior Transplants:    No Status of Transplant:           Current Living Situation    Location:   [address] [place] [id number]  With Whom: lives with their family       Family/ Social Support:    Kerry lives with her son, daughter in law and 3 grandchildren in [place].  available, helpful   Committed Relationship: Kerry reports she is legally  to her , [place] but the two have been  for a year. She reports her  has mental health issues and is currently living in [place]. He is a  working at [employer].         Other Supports: [facility]    available, occasional       Activities/ Functional Ability    Current Level: ambulatory, visually impaired, and independent with ADL's     Transportation drives self       Vocational/Employment/Financial     Employment   disabled   Job Description      Income   SSDI   Insurance      At this time, patient can afford medication costs:  Yes  [facility]        Medical Status    Current Mode of Treatment for ESRD Dialysis   Complications None       Behavioral    Tobacco Use No Chemical Dependency No    Pt reports \"I don't like to not be in control.\"      Psychiatric Impairment No  Pt reports \"I put my trust in the lord.\"    Reading Ability: Good  Education Level: [employer]  Recent Legal History No      Coping Style/Strategies: Jehovah's witness        Ability to Adhere to Complex Medical Regime: Yes     Adherence " History: compliance         Education  _X_ Medicare  _X_ Rehabilitation  _X_ Donor issues  _X_ Community resources  _X_ Post discharge housing  _X_ Financial resources  _X_ Medical insurance options  _X_ Psych adjustment  _X_ Family adjustment  _X_ Health Care Directive Provided Education, SW provided blank document   Psychosocial Risks of Transplant Reviewed and Discussed:  _X_ Increased stress related to emotional,            family, social, employment or financial           situation  _X_ Effect on work and/or disability benefits  _X_ Effect on future health and life           insurance  _X_ Transplant outcome expectations may           not be met  _X_ Mental Health Risks: anxiety,           depression, PTSD, guilt, grief and           chronic fatigue     Notable Items:   None noted.       Final Evaluation/Assessment   Patient seemed to process information well. Appeared well informed, motivated and able to follow post transplant requirements. Behavior was appropriate during interview. Has adequate income and insurance coverage. Adequate social support. No major contraindications noted for transplant.  At this time patient appears to understand the risks and benefits of transplant.      Recommendation  Acceptable    Selection Criteria Met:  Plan for support Yes   Chemical Dependence Yes   Smoking Yes   Mental Health Yes   Adequate Finances Yes    Signature: KHANH Villela [facility]   Title: Clinical

## 2024-10-03 NOTE — PROGRESS NOTES
"Rapid Response Epic Documentation     Situation:      While having multiple tubes of blood drawn pt became lightheaded and said she felt like she might pass out, staff called RRT.    Objective:      Upon RRT arrival pt said she was already feeling better, she stated \"they took a lot of blood and I got lightheaded but I am feeling better now.\"    Assessment:          Pt's color was normal, she no longer had any complaints. Pt stated she wanted to continue on with her appointments. Pt was able to stand without issue. RRT walked with pt to the [facility] where she would wait for her next appointment.    BP:  152/59    Pulse: 55  Respiration: 16  SPO2: 97 %  Glucose: na mg/dl  Mental Status: Alert  CMS: Intact  Stroke Scale: Not Applicable  EKG: Not Performed      Treatment:      Location:          Disposition:      A    Protocol Used:     Other Near syncope      "

## 2024-10-03 NOTE — Clinical Note
10/3/2024      Nisreen Mckenna  [address]  [place] [address]      Dear Colleague,    Thank you for referring your patient, Nisreen Mckenna, to the [facility]PL[facility]. Please see a copy of my visit note below.    No notes on file    Again, thank you for allowing me to participate in the care of your patient.        Sincerely,        Gonsalo Davis MD

## 2024-10-03 NOTE — PROGRESS NOTES
TRANSPLANT NEPHROLOGY RECIPIENT EVALUATION NOTE    Assessment and Plan:  # Kidney/Pancreas Transplant Evaluation: Patient is a good candidate overall. Benefits of a living donor transplant were discussed.    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 She was started on Hemodialysis 6/20/24 and has since transitioned to PD.  She is doing fair on dialysis with continued reports of hypervolemia.  She likely would benefit from a kidney transplant.     #Type II DM :diagnosed x 30 years. Had been off insulin in the past, but restarted insulin with weight gain. She is currently on Lantus 44units with FBS . Last HgbA1c= 9.7    # HTN: -150s. On norvasc, hydralazine, bumex     # Cardiac Risk: Grade II or moderate diastolic dysfunction, moderate to severe mitral annular calcification,  Moderate aortic valve calcification.mild-to-moderate aortic stenosis   # s/p RFA for cardiac arrhythmyia: Hx of Afib/aflutter. Was previously on blood thinners. No current blood thinners   #Hx pulmonary HTN hx : None seen on ECHO 9/2024.  Mild pulmonary edema on CXR  She will  need cardiac risk assessment.     # PAD Screening: Will obtain updated imaging for Transplant Surgery to review    # Hypothyroid: on levothyroxine    # Obesity: central, BMI,34.37. Refer to bariatrics, per surgery BMI goal 30    # TARA: does not use CPAP    # Health Maintenance: Colonoscopy: Up to date per pt, records in [place], Mammogram: Not up to date, PAP: Not indicated, and Dental: Up to date    - Discussed the risks and benefits of a transplant, including the risk of surgery and immunosuppression medications.  Patient's overall evaluation will be  discussed in the Transplant Program's regular meeting with a final recommendation on the patients suitability for transplant to be made at that time.    Pending completion of the full evaluation, patient presently appears to be enough of an acceptable kidney transplant recipient candidate to have any potential kidney donors start the evaluation process.    Evaluation:  Nisreen Mckenna was seen in consultation at the request of Dr. Gonsalo Davis for evaluation as a potential kidney transplant recipient.    Reason for Visit:  Nisreen Mckenna is a 64 year old female with ESKD from diabetes mellitus type 2, who presents for kidney transplant evaluation.    History of Present Illness:    Nisreen Mckenna is a 64 year old female with longstanding DM x30 years and HTN and CKD. She has recently relocated from [place] to live with her son and his family and records are somewhat limited at time of eval.  She has had CKD stage 4 with a GFR 16 and SCR 3 since at least 6/2023. She was struggling with hypoxia and volume overload earlier this year requiring 2 hospitalizations earlier this year. In 5/20/2024 through 5/22/2024 she was hospitalized for worsening renal function and abnormal labs including hyperkalemia and acute on chronic renal failure and again on 06/19/2024 through 06/23/2024 for acute hypoxic respiratory failure fluid overload, end-stage renal disease and uncontrolled diabetes. She ultimately was started on dialysis during her June admission.           Kidney Disease Hx:                Kidney Disease Dx: Diabetes mellitus type 2       Biopsy Proven: no       On Dialysis: Yes, Date initiated: 6/2024 and Dialysis Type: PD;       Primary Nephrologist:   Murali       H/o Kidney Stones: No       H/o Recurrent/Frequent UTI: No         Diabetic Hx: Type 2        Diagnosis Date: Type II DM 30 + years       Medication History:  Lantus 44 units.  FBS        Diabetic Control: Poorly controlled (HbA1c >9%)    Last HbA1c: 9.7 %       Hypoglycemic Unawareness: Yes; feel it at 70s       End-Organ Damage due to DM: Nephropathy and Peripheral neuropathy          Cardiac/Vascular Disease Risk Factors:        Cardiac Risk Factors: Diabetes, Hypertension, CKD, and Age (Male > 55, Female > 65)       Known CAD: Yes;         Known PAD/Caludication Symptoms: No       Known Heart Failure: HFpEF       Arrhythmia: Yes; s/p RFA        Pulmonary Hypertension: No       Valvular Disease: Yes; Moderate AS       Other: None         Viral Serology Status       CMV IgG Antibody: Negative       EBV IgG Antibody: Positive         Volume Status/Weight:        Volume status: Moderately hypervolemic       Weight:  BMI above guidelines       BMI: There is no height or weight on file to calculate BMI.         Functional Capacity/Frailty:          Able to ambulate a city block. Walks to park with grandkids.  Disabiliy x 10 years. . Lives with son and DIL and 3 kids (8 yo 5yo 3yo).     Fatigue/Decreased Energy: [] No [x] Yes  Generalized, some improvement since starting dialysis   Chest Pain or SOB with Exertion: [x] No [] Yes GUEVARA,    Significant Weight Change: [x] No [] Yes    Nausea, Vomiting or Diarrhea: [x] No [] Yes Constipation, No n/v/d   Fever, Sweats or Chills:  [x] No [] Yes    Leg Swelling [] No [x] Yes  DAVI and hands.        History of Cancer: None    Other Significant Medical Issues: None    Allergy Testing Questions:   Medication that caused a reaction None   Antibiotics used that didn't give an allergic reaction?  Patient doesn't know    COVID Vaccination Up To Date: Not asked    Potential Living Kidney Donors: Not sure    Review of Systems:  A comprehensive review of systems was obtained and negative, except as noted in the HPI or PMH.    Past Medical History:   Medical record was reviewed and PMH was discussed with patient and noted below.  Past Medical History:   Diagnosis Date    ESRD (end stage renal disease) on  dialysis (H)     HTN (hypertension)     Hypothyroidism     Type 2 diabetes mellitus (H)        Past Social History:   Past Surgical History:   Procedure Laterality Date    IR CVC TUNNEL PLACEMENT > 5 YRS OF AGE  6/21/2024    IR CVC TUNNEL REMOVAL RIGHT  9/3/2024    IR CVC TUNNEL REVISION RIGHT  7/26/2024     Personal history of bleeding or anesthesia problems: No    Family History:  No family history on file.    Personal History:   Social History     Socioeconomic History    Marital status:      Spouse name: Not on file    Number of children: Not on file    Years of education: Not on file    Highest education level: Not on file   Occupational History    Not on file   Tobacco Use    Smoking status: Never    Smokeless tobacco: Never   Substance and Sexual Activity    Alcohol use: Never    Drug use: Never    Sexual activity: Not on file   Other Topics Concern    Not on file   Social History Narrative    Not on file     Social Determinants of Health     Financial Resource Strain: Not on file   Food Insecurity: Not on file   Transportation Needs: Not on file   Physical Activity: Not on file   Stress: Not on file   Social Connections: Not on file   Interpersonal Safety: Low Risk  (9/3/2024)    Interpersonal Safety     Do you feel physically and emotionally safe where you currently live?: Yes     Within the past 12 months, have you been hit, slapped, kicked or otherwise physically hurt by someone?: No     Within the past 12 months, have you been humiliated or emotionally abused in other ways by your partner or ex-partner?: No   Housing Stability: Not on file       Allergies:  No Known Allergies    Medications:  Current Outpatient Medications   Medication Sig Dispense Refill    amLODIPine (NORVASC) 10 MG tablet Take 10 mg by mouth daily      atorvastatin (LIPITOR) 40 MG tablet Take 40 mg by mouth daily      bumetanide (BUMEX) 2 MG tablet Take 2 mg by mouth 2 times daily      calcitRIOL (ROCALTROL) 0.25 MCG capsule  Take 1 capsule (0.25 mcg) by mouth daily 30 capsule 0    Ferrous Sulfate (IRON) 325 (65 Fe) MG tablet Take 1 tablet by mouth daily      hydrALAZINE (APRESOLINE) 25 MG tablet Take 25 mg by mouth 3 times daily      insulin glargine (LANTUS PEN) 100 UNIT/ML pen Inject subcutaneously at bedtime      levothyroxine (SYNTHROID/LEVOTHROID) 175 MCG tablet Take 175 mcg by mouth daily      sodium bicarbonate 650 MG tablet Take 1 tablet (650 mg) by mouth 2 times daily 60 tablet 0    sodium zirconium cyclosilicate (LOKELMA) 5 g PACK packet Take 1 packet (5 g) by mouth daily 30 each 0    vitamin D3 (CHOLECALCIFEROL) 50 mcg (2000 units) tablet Take 1 tablet by mouth daily       No current facility-administered medications for this visit.       Vitals:  There were no vitals taken for this visit.    Exam:  GENERAL APPEARANCE: alert and no distress  HENT: mouth without ulcers or lesions  RESP: lungs clear to auscultation - no rales, rhonchi or wheezes  CV: regular rhythm, normal rate, no rub,+ murmur  EDEMA: +1LE edema bilaterally  ABDOMEN: soft, rounded, obese, nondistended, nontender, bowel sounds normal  MS: extremities normal - no gross deformities noted, no evidence of inflammation in joints, no muscle tenderness  SKIN: no rash  DIALYSIS ACCESS:  Peritoneal dialysis catheter    Results:   No results found for this or any previous visit (from the past 336 hour(s)).

## 2024-10-03 NOTE — NURSING NOTE
Kidney Transplant Evaluation - 10/3/2024     The patient was provided with the following documents:  What You Need to Know About a Kidney Transplant  Adult Kidney Transplant - A Guide for Patients  SRTR Data Sheet - Kidney  Brochure - Kidney Allocation  Brochure - Islet Allocation  Brochure - Multiple Listing and Waiting Time Transfer  What Every Patient Needs to Know (UNOS)  Finding a Donor  Solid Organ Transplant Patient Handbook    Nisreenrenée Benjamin Mckenna signed the  Receipt of Information for Organ Transplant Recipient, Kidney for Life Consent, KDPI > 85% consents.  Instructed pt that Claudine will call her with the outcome of next week's Selection Committee.        Summary    Team s concerns/comments: Cardiology risk assessment     Action/Plan: Continue eval    Expected Selection Meeting Discussion: 10/09/24

## 2024-10-03 NOTE — Clinical Note
10/3/2024      Nisreen Mckenna  [address]  [place] [address]      Dear Colleague,    Thank you for referring your patient, Nisreen Mckenna, to the [facility]. Please see a copy of my visit note below.    TRANSPLANT NEPHROLOGY RECIPIENT EVALUATION NOTE    Assessment and Plan:  # Kidney/Pancreas Transplant Evaluation: Patient is a good candidate overall. Benefits of a living donor transplant were discussed.    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 She was started on Hemodialysis 6/20/24 and has since transitioned to PD.  She is doing fair on dialysis with continued reports of hypervolemia.  She likely would benefit from a kidney transplant.     #Type II DM :diagnosed x 30 years. Had been off insulin in the past, but restarted insulin with weight gain. She is currently on Lantus 44units with FBS . Last HgbA1c= 9.7    # HTN: -150s. On norvasc, hydralazine, bumex     # Cardiac Risk: Grade II or moderate diastolic dysfunction, moderate to severe mitral annular calcification,  Moderate aortic valve calcification.mild-to-moderate aortic stenosis   # s/p RFA for cardiac arrhythmyia: Hx of Afib/aflutter. Was previously on blood thinners. No current blood thinners   #Hx pulmonary HTN hx : None seen on ECHO 9/2024.  Mild pulmonary edema on CXR  She will  need cardiac risk assessment.     # PAD Screening: Will obtain updated imaging for Transplant Surgery to review    # Hypothyroid: on levothyroxine    # Obesity: central, BMI,34.37. Refer to bariatrics, per surgery BMI goal 30    # TARA: does not use CPAP    # Health Maintenance: Colonoscopy: Up to date per pt,  records in [place], Mammogram: Not up to date, PAP: Not indicated, and Dental: Up to date    - Discussed the risks and benefits of a transplant, including the risk of surgery and immunosuppression medications.  Patient's overall evaluation will be discussed in the Transplant Program's regular meeting with a final recommendation on the patients suitability for transplant to be made at that time.    Pending completion of the full evaluation, patient presently appears to be enough of an acceptable kidney transplant recipient candidate to have any potential kidney donors start the evaluation process.    Evaluation:  Nisreen Mckenna was seen in consultation at the request of Dr. Gonsalo Davis for evaluation as a potential kidney transplant recipient.    Reason for Visit:  Nisreen Mckenna is a 64 year old female with ESKD from diabetes mellitus type 2, who presents for kidney transplant evaluation.    History of Present Illness:    Nisreen Mckenna is a 64 year old female with longstanding DM x30 years and HTN and CKD. She has recently relocated from [place] to live with her son and his family and records are somewhat limited at time of eval.  She has had CKD stage 4 with a GFR 16 and SCR 3 since at least 6/2023. She was struggling with hypoxia and volume overload earlier this year requiring 2 hospitalizations earlier this year. In 5/20/2024 through 5/22/2024 she was hospitalized for worsening renal function and abnormal labs including hyperkalemia and acute on chronic renal failure and again on 06/19/2024 through 06/23/2024 for acute hypoxic respiratory failure fluid overload, end-stage renal disease and uncontrolled diabetes. She ultimately was started on dialysis during her June admission.           Kidney Disease Hx:                Kidney Disease Dx: Diabetes mellitus type 2       Biopsy Proven: no       On Dialysis: Yes, Date initiated: 6/2024 and Dialysis Type: PD;       Primary Nephrologist:   Murali        H/o Kidney Stones: No       H/o Recurrent/Frequent UTI: No         Diabetic Hx: Type 2        Diagnosis Date: Type II DM 30 + years       Medication History:  Lantus 44 units.  FBS        Diabetic Control: Poorly controlled (HbA1c >9%)   Last HbA1c: 9.7 %       Hypoglycemic Unawareness: Yes; feel it at 70s       End-Organ Damage due to DM: Nephropathy and Peripheral neuropathy          Cardiac/Vascular Disease Risk Factors:        Cardiac Risk Factors: Diabetes, Hypertension, CKD, and Age (Male > 55, Female > 65)       Known CAD: Yes;         Known PAD/Caludication Symptoms: No       Known Heart Failure: HFpEF       Arrhythmia: Yes; s/p RFA        Pulmonary Hypertension: No       Valvular Disease: Yes; Moderate AS       Other: None         Viral Serology Status       CMV IgG Antibody: Negative       EBV IgG Antibody: Positive         Volume Status/Weight:        Volume status: Moderately hypervolemic       Weight:  BMI above guidelines       BMI: There is no height or weight on file to calculate BMI.         Functional Capacity/Frailty:          Able to ambulate a city block. Walks to park with grandkids.  Disabiliy x 10 years. . Lives with son and DIL and 3 kids (8 yo 5yo 1yo).     Fatigue/Decreased Energy: [] No [x] Yes  Generalized, some improvement since starting dialysis   Chest Pain or SOB with Exertion: [x] No [] Yes GUEVARA,    Significant Weight Change: [x] No [] Yes    Nausea, Vomiting or Diarrhea: [x] No [] Yes Constipation, No n/v/d   Fever, Sweats or Chills:  [x] No [] Yes    Leg Swelling [] No [x] Yes  DAVI and hands.        History of Cancer: None    Other Significant Medical Issues: None    Allergy Testing Questions:   Medication that caused a reaction None   Antibiotics used that didn't give an allergic reaction?  Patient doesn't know    COVID Vaccination Up To Date: Not asked    Potential Living Kidney Donors: Not sure    Review of Systems:  A comprehensive review of systems  was obtained and negative, except as noted in the HPI or PMH.    Past Medical History:   Medical record was reviewed and PMH was discussed with patient and noted below.  Past Medical History:   Diagnosis Date    ESRD (end stage renal disease) on dialysis (H)     HTN (hypertension)     Hypothyroidism     Type 2 diabetes mellitus (H)        Past Social History:   Past Surgical History:   Procedure Laterality Date    IR CVC TUNNEL PLACEMENT > 5 YRS OF AGE  6/21/2024    IR CVC TUNNEL REMOVAL RIGHT  9/3/2024    IR CVC TUNNEL REVISION RIGHT  7/26/2024     Personal history of bleeding or anesthesia problems: No    Family History:  No family history on file.    Personal History:   Social History     Socioeconomic History    Marital status:      Spouse name: Not on file    Number of children: Not on file    Years of education: Not on file    Highest education level: Not on file   Occupational History    Not on file   Tobacco Use    Smoking status: Never    Smokeless tobacco: Never   Substance and Sexual Activity    Alcohol use: Never    Drug use: Never    Sexual activity: Not on file   Other Topics Concern    Not on file   Social History Narrative    Not on file     Social Determinants of Health     Financial Resource Strain: Not on file   Food Insecurity: Not on file   Transportation Needs: Not on file   Physical Activity: Not on file   Stress: Not on file   Social Connections: Not on file   Interpersonal Safety: Low Risk  (9/3/2024)    Interpersonal Safety     Do you feel physically and emotionally safe where you currently live?: Yes     Within the past 12 months, have you been hit, slapped, kicked or otherwise physically hurt by someone?: No     Within the past 12 months, have you been humiliated or emotionally abused in other ways by your partner or ex-partner?: No   Housing Stability: Not on file       Allergies:  No Known Allergies    Medications:  Current Outpatient Medications   Medication Sig Dispense Refill     amLODIPine (NORVASC) 10 MG tablet Take 10 mg by mouth daily      atorvastatin (LIPITOR) 40 MG tablet Take 40 mg by mouth daily      bumetanide (BUMEX) 2 MG tablet Take 2 mg by mouth 2 times daily      calcitRIOL (ROCALTROL) 0.25 MCG capsule Take 1 capsule (0.25 mcg) by mouth daily 30 capsule 0    Ferrous Sulfate (IRON) 325 (65 Fe) MG tablet Take 1 tablet by mouth daily      hydrALAZINE (APRESOLINE) 25 MG tablet Take 25 mg by mouth 3 times daily      insulin glargine (LANTUS PEN) 100 UNIT/ML pen Inject subcutaneously at bedtime      levothyroxine (SYNTHROID/LEVOTHROID) 175 MCG tablet Take 175 mcg by mouth daily      sodium bicarbonate 650 MG tablet Take 1 tablet (650 mg) by mouth 2 times daily 60 tablet 0    sodium zirconium cyclosilicate (LOKELMA) 5 g PACK packet Take 1 packet (5 g) by mouth daily 30 each 0    vitamin D3 (CHOLECALCIFEROL) 50 mcg (2000 units) tablet Take 1 tablet by mouth daily       No current facility-administered medications for this visit.       Vitals:  There were no vitals taken for this visit.    Exam:  GENERAL APPEARANCE: alert and no distress  HENT: mouth without ulcers or lesions  RESP: lungs clear to auscultation - no rales, rhonchi or wheezes  CV: regular rhythm, normal rate, no rub,+ murmur  EDEMA: +1LE edema bilaterally  ABDOMEN: soft, rounded, obese, nondistended, nontender, bowel sounds normal  MS: extremities normal - no gross deformities noted, no evidence of inflammation in joints, no muscle tenderness  SKIN: no rash  DIALYSIS ACCESS:  Peritoneal dialysis catheter    Results:   No results found for this or any previous visit (from the past 336 hour(s)).            Again, thank you for allowing me to participate in the care of your patient.        Sincerely,        Afia Wilson NP

## 2024-10-04 LAB
C PEPTIDE SERPL-MCNC: 17.8 NG/ML (ref 0.9–6.9)
CMV IGG SERPL IA-ACNC: <0.2 U/ML
CMV IGG SERPL IA-ACNC: NORMAL
DRVVT SCREEN RATIO: 0.79
EBV VCA IGG SER IA-ACNC: 201 U/ML
EBV VCA IGG SER IA-ACNC: POSITIVE
FASTING STATUS PATIENT QL REPORTED: ABNORMAL
LA PPP-IMP: NEGATIVE
LUPUS INTERPRETATION: NORMAL
PTT RATIO: 1
QUANTIFERON MITOGEN: 4.94 IU/ML
QUANTIFERON NIL TUBE: 0.03 IU/ML
QUANTIFERON TB1 TUBE: 0.05 IU/ML
QUANTIFERON TB2 TUBE: 0.02
THROMBIN TIME: 17.1 SECONDS (ref 13–19)
VZV IGG SER QL IA: 882.2 INDEX
VZV IGG SER QL IA: POSITIVE

## 2024-10-05 LAB
BACTERIA UR CULT: ABNORMAL
BACTERIA UR CULT: ABNORMAL
GAMMA INTERFERON BACKGROUND BLD IA-ACNC: 0.03 IU/ML
M TB IFN-G BLD-IMP: NEGATIVE
M TB IFN-G CD4+ BCKGRND COR BLD-ACNC: 4.91 IU/ML
MITOGEN IGNF BCKGRD COR BLD-ACNC: -0.01 IU/ML
MITOGEN IGNF BCKGRD COR BLD-ACNC: 0.02 IU/ML

## 2024-10-07 LAB
CARDIOLIPIN IGG SER IA-ACNC: <2 GPL-U/ML
CARDIOLIPIN IGG SER IA-ACNC: NEGATIVE
CARDIOLIPIN IGM SER IA-ACNC: <2 MPL-U/ML
CARDIOLIPIN IGM SER IA-ACNC: NEGATIVE

## 2024-10-08 LAB
A*: NORMAL
A*LOCUS SEROLOGIC EQUIVALENT: 11
A*LOCUS: NORMAL
A*SEROLOGIC EQUIVALENT: 74
ABTEST METHOD: NORMAL
B*: NORMAL
B*LOCUS SEROLOGIC EQUIVALENT: 7
B*LOCUS: NORMAL
B*SEROLOGIC EQUIVALENT: 8
BW-1: NORMAL
C*: NORMAL
C*LOCUS SEROLOGIC EQUIVALENT: 7
C*LOCUS: NORMAL
C*SEROLOGIC EQUIVALENT: 7
DPA1*: NORMAL
DPA1*LOCUS: NORMAL
DPB1*: NORMAL
DPB1*LOCUS NMDP: NORMAL
DPB1*LOCUS: NORMAL
DPB1*NMDP: NORMAL
DQA1*LOCUS: NORMAL
DQB1*: NORMAL
DQB1*LOCUS SEROLOGIC EQUIVALENT: 5
DQB1*LOCUS: NORMAL
DQB1*SEROLOGIC EQUIVALENT: 6
DRB1*: NORMAL
DRB1*LOCUS SEROLOGIC EQUIVALENT: 11
DRB1*LOCUS: NORMAL
DRB1*SEROLOGIC EQUIVALENT: 15
DRB3*LOCUS SEROLOGIC EQUIVALENT: 52
DRB3*LOCUS: NORMAL
DRB5*: NORMAL
DRB5*SEROLOGIC EQUIVALENT: 51
DRSSO TEST METHOD: NORMAL

## 2024-10-09 ENCOUNTER — COMMITTEE REVIEW (OUTPATIENT)
Dept: TRANSPLANT | Facility: CLINIC | Age: 64
End: 2024-10-09
Payer: COMMERCIAL

## 2024-10-09 NOTE — COMMITTEE REVIEW
Kidney/Pancreas Committee Review Note     Evaluation Date: 10/3/2024  Committee Review Date: 10/9/2024    Organ being evaluated for: Kidney/Pancreas    Transplant Phase: Evaluation  Transplant Status: Active    Transplant Coordinator: Claudine Green  Transplant Surgeon:  Dr. Davis      Referring Physician: Remigio Cobian    Primary Diagnosis: Diabetes Mellitus Type 2  Secondary Diagnosis: Hypertension    Committee Review Members:  Nephrology Afia Wilson, NP, Divya Bonilla MD, Deepak Brink, APRN CNP, Monique Coffey MD    - Clinical Piero Xiao, MSW   Transplant BERNARDA HERNANDES, RN, Rama Ivory, GRECIA, Cori Albert, GRECIA, Charley Romero RN   Transplant Surgery Gonsalo Davis MD       Transplant Eligibility: Insulin-dependent diabetes mellitus, Irreversible chronic kidney disease treated w/dialysis or expected need for dialysis    Committee Review Decision: Needs Re-presentation    Relative Contraindications: None    Absolute Contraindications: BMI >30     Committee Chair Gonsalo Davis MD verbally attested to the committee's decision.    Committee Discussion Details: Reviewed pt's medical status and evaluation results to date with multidisciplinary committee.    Recommended the following evaluation items:    Cardiology: Will need cardiac risk assessment including a coronary angiogram and updated EKG as the EKG was not done at [facility].  BMI: (Goal Weight BMI 30 or 164 lbs): Should refer to weight management.   Imaging Recommended:  Iliac US and CT a/p for vascular targets.   Health Maintenance:  Mammogram due, need colonoscopy records.   Endocrinology: Needs to establish with PCP for improved BG control.     Patient should have live donors register now to initiate donor evaluation: Yes    Committee determined that patient is a Good candidate for Kidney Pancreas    Listing plan: Needs Representation  and Will not list at this time as patient is on  dialysis    A2B Candidate: Yes, will need repeat titers and consent if still a candidate.     Patient will be called and summary letter will be sent.

## 2024-10-10 ENCOUNTER — TELEPHONE (OUTPATIENT)
Dept: TRANSPLANT | Facility: CLINIC | Age: 64
End: 2024-10-10
Payer: COMMERCIAL

## 2024-10-10 DIAGNOSIS — Z99.2 ANEMIA IN CHRONIC KIDNEY DISEASE, ON CHRONIC DIALYSIS (H): ICD-10-CM

## 2024-10-10 DIAGNOSIS — I12.0 HYPERTENSIVE CHRONIC KIDNEY DISEASE WITH STAGE 5 CHRONIC KIDNEY DISEASE OR END STAGE RENAL DISEASE (H): ICD-10-CM

## 2024-10-10 DIAGNOSIS — N18.6 ANEMIA IN CHRONIC KIDNEY DISEASE, ON CHRONIC DIALYSIS (H): ICD-10-CM

## 2024-10-10 DIAGNOSIS — N18.6 ESRD (END STAGE RENAL DISEASE) (H): ICD-10-CM

## 2024-10-10 DIAGNOSIS — Z01.818 ENCOUNTER FOR PRE-TRANSPLANT EVALUATION FOR KIDNEY TRANSPLANT: ICD-10-CM

## 2024-10-10 DIAGNOSIS — Z76.82 ORGAN TRANSPLANT CANDIDATE: Primary | ICD-10-CM

## 2024-10-10 DIAGNOSIS — D63.1 ANEMIA IN CHRONIC KIDNEY DISEASE, ON CHRONIC DIALYSIS (H): ICD-10-CM

## 2024-10-10 NOTE — LETTER
10/10/24        Nisreen Mckenna  [address]  [place] [address]        Dear Nisreen,    It was a pleasure to see you recently for consideration of kidney and pancreas transplantation. Your pre-transplant evaluation results were reviewed at our Multidisciplinary Selection Committee on 10/09/2024. The committee is requesting the following items are completed before determining your candidacy:    Cardiologist appointment for an assessment of your heart status and for a recommendation to proceed with a kidney transplant surgery including a coronary angiogram.  EKG as this was not done at your pre-kidney/pancreas evaluation. Our schedulers will call you to schedule.   Abdominal Pelvic CT without contrast to assess your blood vessels. This helps the surgeons decide if and where they can place the kidney and/or pancreas. Our schedulers will call you to schedule.   Iliac Artery Ultrasound to assess the blood flow through vital blood vessels for transplant. Our schedulers will call you to schedule.  BMI goal of 30 which is equal to about 165 lbs. A referral was made to the weight management clinic and they should be reaching out to you to schedule an appointment.   Mammogram is due. Please work with your primary care provider to complete.   Improved blood glucose control. Please work with your primary care provider to improve your blood glucose control and to help lower your A1c.    Pap smear records. Please have your most recent pap smear records faxed to our transplant office at [phone]. If you know where you had one most recently, please let me know and I can request the records. If unable to locate most recent pap smear, we will request an updated pap smear.   Please work with your primary care provider or primary nephrologist to ensure you are up-to-date on recommended vaccinations including Hepatitis B, Pneumovax and Shingrix.     For any questions, please contact the Transplant Office at [phone].  My direct number is [phone].       Sincerely,  Claudine Green RN; Pre Kidney and Pancreas Transplant Coordinator     Solid Organ Transplant  [facility]

## 2024-10-10 NOTE — TELEPHONE ENCOUNTER
Contacted patient to review outcome of selection committee meeting (See selection committee encounter).   Explained to patient that she needs to complete all components of the evaluation to be eligible for active status on the waiting list or to proceed with a live donor kidney transplant.   Reviewed next steps based on outcomes:   Patient will not be listed (patient is on dialysis and evaluation is not complete), patient will receive:    - An Evaluation Summary Letter indicating what is needed to complete evaluation-discussed with patient if they would like to have testing done with [facility] or locally  Confirmed with patient that on successful completion of outstanding components, patient is eligible for active status and they will receive a follow-up call.   Confirmed that patient has contact information for additional questions or concerns.

## 2024-10-11 LAB
FACTOR 2 INTERPRETATION: NORMAL
FACTOR V INTERPRETATION: NORMAL
LAB DIRECTOR COMMENTS: NORMAL
LAB DIRECTOR DISCLAIMER: NORMAL
LAB DIRECTOR INTERPRETATION: NORMAL
LAB DIRECTOR METHODOLOGY: NORMAL
LAB DIRECTOR RESULTS: NORMAL
LOCATION OF TASK: NORMAL
SPECIMEN DESCRIPTION: NORMAL

## 2024-10-17 LAB
PROTOCOL CUTOFF: NORMAL
SA 1  COMMENTS: NORMAL
SA 1 CELL: NORMAL
SA 1 TEST METHOD: NORMAL
SA 2 CELL: NORMAL
SA 2 COMMENTS: NORMAL
SA 2 TEST METHOD: NORMAL
SA1 HI RISK ABY: NORMAL
SA1 MOD RISK ABY: NORMAL
SA2 HI RISK ABY: NORMAL
SA2 MOD RISK ABY: NORMAL
UNACCEPTABLE ANTIGENS: NORMAL
UNOS CPRA: 9

## 2024-10-24 ENCOUNTER — ANCILLARY PROCEDURE (OUTPATIENT)
Dept: ULTRASOUND IMAGING | Facility: CLINIC | Age: 64
End: 2024-10-24
Attending: NURSE PRACTITIONER
Payer: COMMERCIAL

## 2024-10-24 ENCOUNTER — ANCILLARY PROCEDURE (OUTPATIENT)
Dept: CT IMAGING | Facility: CLINIC | Age: 64
End: 2024-10-24
Attending: NURSE PRACTITIONER
Payer: COMMERCIAL

## 2024-10-24 DIAGNOSIS — Z76.82 ORGAN TRANSPLANT CANDIDATE: ICD-10-CM

## 2024-10-24 DIAGNOSIS — Z99.2 ANEMIA IN CHRONIC KIDNEY DISEASE, ON CHRONIC DIALYSIS (H): ICD-10-CM

## 2024-10-24 DIAGNOSIS — D63.1 ANEMIA IN CHRONIC KIDNEY DISEASE, ON CHRONIC DIALYSIS (H): ICD-10-CM

## 2024-10-24 DIAGNOSIS — N18.6 ANEMIA IN CHRONIC KIDNEY DISEASE, ON CHRONIC DIALYSIS (H): ICD-10-CM

## 2024-10-24 DIAGNOSIS — N18.6 ESRD (END STAGE RENAL DISEASE) (H): ICD-10-CM

## 2024-10-24 DIAGNOSIS — Z01.818 ENCOUNTER FOR PRE-TRANSPLANT EVALUATION FOR KIDNEY TRANSPLANT: ICD-10-CM

## 2024-10-24 DIAGNOSIS — I12.0 HYPERTENSIVE CHRONIC KIDNEY DISEASE WITH STAGE 5 CHRONIC KIDNEY DISEASE OR END STAGE RENAL DISEASE (H): ICD-10-CM

## 2024-10-24 PROCEDURE — 74176 CT ABD & PELVIS W/O CONTRAST: CPT | Performed by: STUDENT IN AN ORGANIZED HEALTH CARE EDUCATION/TRAINING PROGRAM

## 2024-10-24 PROCEDURE — 93000 ELECTROCARDIOGRAM COMPLETE: CPT | Performed by: INTERNAL MEDICINE

## 2024-10-24 PROCEDURE — 93978 VASCULAR STUDY: CPT | Mod: GC | Performed by: RADIOLOGY

## 2024-10-28 LAB
ATRIAL RATE - MUSE: 68 BPM
DIASTOLIC BLOOD PRESSURE - MUSE: NORMAL MMHG
INTERPRETATION ECG - MUSE: NORMAL
P AXIS - MUSE: -5 DEGREES
PR INTERVAL - MUSE: 232 MS
QRS DURATION - MUSE: 98 MS
QT - MUSE: 442 MS
QTC - MUSE: 469 MS
R AXIS - MUSE: 26 DEGREES
SYSTOLIC BLOOD PRESSURE - MUSE: NORMAL MMHG
T AXIS - MUSE: 52 DEGREES
VENTRICULAR RATE- MUSE: 68 BPM

## 2024-10-29 ENCOUNTER — TEAM CONFERENCE (OUTPATIENT)
Dept: TRANSPLANT | Facility: CLINIC | Age: 64
End: 2024-10-29
Payer: COMMERCIAL

## 2024-10-29 DIAGNOSIS — N18.6 ESRD (END STAGE RENAL DISEASE) (H): ICD-10-CM

## 2024-10-29 DIAGNOSIS — Z76.82 ORGAN TRANSPLANT CANDIDATE: Primary | ICD-10-CM

## 2024-10-29 DIAGNOSIS — E11.21 TYPE 2 DIABETES MELLITUS WITH DIABETIC NEPHROPATHY, WITH LONG-TERM CURRENT USE OF INSULIN (H): ICD-10-CM

## 2024-10-29 DIAGNOSIS — N18.6 ANEMIA IN CHRONIC KIDNEY DISEASE, ON CHRONIC DIALYSIS (H): ICD-10-CM

## 2024-10-29 DIAGNOSIS — Z99.2 ANEMIA IN CHRONIC KIDNEY DISEASE, ON CHRONIC DIALYSIS (H): ICD-10-CM

## 2024-10-29 DIAGNOSIS — Z79.4 TYPE 2 DIABETES MELLITUS WITH DIABETIC NEPHROPATHY, WITH LONG-TERM CURRENT USE OF INSULIN (H): ICD-10-CM

## 2024-10-29 DIAGNOSIS — Z01.818 ENCOUNTER FOR PRE-TRANSPLANT EVALUATION FOR KIDNEY TRANSPLANT: ICD-10-CM

## 2024-10-29 DIAGNOSIS — D63.1 ANEMIA IN CHRONIC KIDNEY DISEASE, ON CHRONIC DIALYSIS (H): ICD-10-CM

## 2024-10-29 NOTE — TELEPHONE ENCOUNTER
Image Review Meeting    ATTENDEES: Dr. Davis    IMAGES REVIEWED: CT abdomen/pelvis (10/24/2024) and Iliacs (10/24/2024)     DECISION: Target vessels suitable for kidney, doable for pancreas but should re-evaluate for pancreas closer to activation.     INCIDENTALS: Yes: Adrenal nodule does not warrant follow-up. Pt needs a follow-up ultrasound of kidneys for small cysts.      Pt was called and updated on results of imaging review meeting. She had no further questions. Smartset order placed.

## 2024-11-06 ENCOUNTER — ANCILLARY PROCEDURE (OUTPATIENT)
Dept: ULTRASOUND IMAGING | Facility: CLINIC | Age: 64
End: 2024-11-06
Attending: SURGERY
Payer: COMMERCIAL

## 2024-11-06 DIAGNOSIS — Z01.818 ENCOUNTER FOR PRE-TRANSPLANT EVALUATION FOR KIDNEY TRANSPLANT: ICD-10-CM

## 2024-11-06 DIAGNOSIS — Z79.4 TYPE 2 DIABETES MELLITUS WITH DIABETIC NEPHROPATHY, WITH LONG-TERM CURRENT USE OF INSULIN (H): ICD-10-CM

## 2024-11-06 DIAGNOSIS — N18.6 ESRD (END STAGE RENAL DISEASE) (H): ICD-10-CM

## 2024-11-06 DIAGNOSIS — Z99.2 ANEMIA IN CHRONIC KIDNEY DISEASE, ON CHRONIC DIALYSIS (H): ICD-10-CM

## 2024-11-06 DIAGNOSIS — N18.6 ANEMIA IN CHRONIC KIDNEY DISEASE, ON CHRONIC DIALYSIS (H): ICD-10-CM

## 2024-11-06 DIAGNOSIS — D63.1 ANEMIA IN CHRONIC KIDNEY DISEASE, ON CHRONIC DIALYSIS (H): ICD-10-CM

## 2024-11-06 DIAGNOSIS — Z76.82 ORGAN TRANSPLANT CANDIDATE: ICD-10-CM

## 2024-11-06 DIAGNOSIS — E11.21 TYPE 2 DIABETES MELLITUS WITH DIABETIC NEPHROPATHY, WITH LONG-TERM CURRENT USE OF INSULIN (H): ICD-10-CM

## 2024-11-06 PROCEDURE — 76770 US EXAM ABDO BACK WALL COMP: CPT | Performed by: RADIOLOGY

## 2024-11-08 ENCOUNTER — TEAM CONFERENCE (OUTPATIENT)
Dept: TRANSPLANT | Facility: CLINIC | Age: 64
End: 2024-11-08
Payer: COMMERCIAL

## 2024-11-08 DIAGNOSIS — Z76.82 ORGAN TRANSPLANT CANDIDATE: Primary | ICD-10-CM

## 2024-11-08 DIAGNOSIS — D63.1 ANEMIA IN CHRONIC KIDNEY DISEASE, ON CHRONIC DIALYSIS (H): ICD-10-CM

## 2024-11-08 DIAGNOSIS — Z79.4 TYPE 2 DIABETES MELLITUS WITH DIABETIC NEPHROPATHY, WITH LONG-TERM CURRENT USE OF INSULIN (H): ICD-10-CM

## 2024-11-08 DIAGNOSIS — Z99.2 ANEMIA IN CHRONIC KIDNEY DISEASE, ON CHRONIC DIALYSIS (H): ICD-10-CM

## 2024-11-08 DIAGNOSIS — E11.21 TYPE 2 DIABETES MELLITUS WITH DIABETIC NEPHROPATHY, WITH LONG-TERM CURRENT USE OF INSULIN (H): ICD-10-CM

## 2024-11-08 DIAGNOSIS — N18.6 ESRD (END STAGE RENAL DISEASE) (H): ICD-10-CM

## 2024-11-08 DIAGNOSIS — N18.6 ANEMIA IN CHRONIC KIDNEY DISEASE, ON CHRONIC DIALYSIS (H): ICD-10-CM

## 2024-11-08 DIAGNOSIS — G47.33 OSA (OBSTRUCTIVE SLEEP APNEA): ICD-10-CM

## 2024-11-08 NOTE — TELEPHONE ENCOUNTER
Image Review Meeting    ATTENDEES: Dr. Anton    IMAGES REVIEWED: US Renal Complete    DECISION: Reviewed patient likely has a combination of simple and mildly complex renal cortical cysts. Pt will need a MRI for grading of cysts. If over a 2, will need a urology referral.     INCIDENTALS: No       I did call and speak with Nisreen to let her know about the need for a MRI. Pt verbalized understanding and had no further questions.

## 2024-12-02 ENCOUNTER — ANCILLARY PROCEDURE (OUTPATIENT)
Dept: MRI IMAGING | Facility: CLINIC | Age: 64
End: 2024-12-02
Attending: SURGERY
Payer: COMMERCIAL

## 2024-12-02 DIAGNOSIS — N18.6 ESRD (END STAGE RENAL DISEASE) (H): ICD-10-CM

## 2024-12-02 DIAGNOSIS — Z79.4 TYPE 2 DIABETES MELLITUS WITH DIABETIC NEPHROPATHY, WITH LONG-TERM CURRENT USE OF INSULIN (H): ICD-10-CM

## 2024-12-02 DIAGNOSIS — Z76.82 ORGAN TRANSPLANT CANDIDATE: ICD-10-CM

## 2024-12-02 DIAGNOSIS — E11.21 TYPE 2 DIABETES MELLITUS WITH DIABETIC NEPHROPATHY, WITH LONG-TERM CURRENT USE OF INSULIN (H): ICD-10-CM

## 2024-12-02 DIAGNOSIS — D63.1 ANEMIA IN CHRONIC KIDNEY DISEASE, ON CHRONIC DIALYSIS (H): ICD-10-CM

## 2024-12-02 DIAGNOSIS — G47.33 OSA (OBSTRUCTIVE SLEEP APNEA): ICD-10-CM

## 2024-12-02 DIAGNOSIS — Z99.2 ANEMIA IN CHRONIC KIDNEY DISEASE, ON CHRONIC DIALYSIS (H): ICD-10-CM

## 2024-12-02 DIAGNOSIS — N18.6 ANEMIA IN CHRONIC KIDNEY DISEASE, ON CHRONIC DIALYSIS (H): ICD-10-CM

## 2024-12-02 PROCEDURE — 74181 MRI ABDOMEN W/O CONTRAST: CPT

## 2024-12-03 ENCOUNTER — TEAM CONFERENCE (OUTPATIENT)
Dept: TRANSPLANT | Facility: CLINIC | Age: 64
End: 2024-12-03
Payer: COMMERCIAL

## 2024-12-03 NOTE — TELEPHONE ENCOUNTER
Image Review Meeting 12/3/2024    ATTENDEES: Dr Tellez    IMAGES REVIEWED: renal ultrasound 11/6/2024 and renal MRI 12/2/2024    DECISION: needs additional imaging for better visualization. Needs CT with and without renal mass protocol. Needs GI for pancreatic cysts. May need urology for renal cysts depending on updated imaging    INCIDENTALS: Yes: pancreatic cysts     Orders will be placed and patient will be called.

## 2024-12-05 ENCOUNTER — TELEPHONE (OUTPATIENT)
Dept: TRANSPLANT | Facility: CLINIC | Age: 64
End: 2024-12-05
Payer: COMMERCIAL

## 2024-12-05 DIAGNOSIS — Z76.82 ORGAN TRANSPLANT CANDIDATE: ICD-10-CM

## 2024-12-05 DIAGNOSIS — I10 HYPERTENSION: ICD-10-CM

## 2024-12-05 DIAGNOSIS — E11.9 DIABETES MELLITUS, TYPE 2 (H): Primary | ICD-10-CM

## 2024-12-05 DIAGNOSIS — N18.6 ESRD (END STAGE RENAL DISEASE) (H): ICD-10-CM

## 2024-12-05 NOTE — TELEPHONE ENCOUNTER
Called Nisreen to let her know we reviewed her MRI and ultrasound. Per radiology she will need a follow up CT with/without renal protocol and GI consult for the pancreatic cyst that was identified. She understands and is in agreement. Orders routed to scheduling.

## 2024-12-15 ENCOUNTER — HEALTH MAINTENANCE LETTER (OUTPATIENT)
Age: 64
End: 2024-12-15

## 2024-12-17 ENCOUNTER — ANCILLARY PROCEDURE (OUTPATIENT)
Dept: CT IMAGING | Facility: CLINIC | Age: 64
End: 2024-12-17
Attending: NURSE PRACTITIONER
Payer: COMMERCIAL

## 2024-12-17 DIAGNOSIS — I10 HYPERTENSION: ICD-10-CM

## 2024-12-17 DIAGNOSIS — Z76.82 ORGAN TRANSPLANT CANDIDATE: ICD-10-CM

## 2024-12-17 DIAGNOSIS — E11.9 DIABETES MELLITUS, TYPE 2 (H): ICD-10-CM

## 2024-12-17 DIAGNOSIS — N18.6 ESRD (END STAGE RENAL DISEASE) (H): ICD-10-CM

## 2024-12-17 PROCEDURE — 74178 CT ABD&PLV WO CNTR FLWD CNTR: CPT | Mod: GC | Performed by: RADIOLOGY

## 2024-12-17 RX ORDER — IOPAMIDOL 755 MG/ML
92 INJECTION, SOLUTION INTRAVASCULAR ONCE
Status: COMPLETED | OUTPATIENT
Start: 2024-12-17 | End: 2024-12-17

## 2024-12-17 RX ADMIN — IOPAMIDOL 92 ML: 755 INJECTION, SOLUTION INTRAVASCULAR at 14:30

## 2024-12-17 NOTE — DISCHARGE INSTRUCTIONS

## 2024-12-26 ENCOUNTER — TELEPHONE (OUTPATIENT)
Dept: TRANSPLANT | Facility: CLINIC | Age: 64
End: 2024-12-26
Payer: COMMERCIAL

## 2024-12-26 DIAGNOSIS — Z79.4 TYPE 2 DIABETES MELLITUS WITH DIABETIC NEPHROPATHY, WITH LONG-TERM CURRENT USE OF INSULIN (H): ICD-10-CM

## 2024-12-26 DIAGNOSIS — E11.21 TYPE 2 DIABETES MELLITUS WITH DIABETIC NEPHROPATHY, WITH LONG-TERM CURRENT USE OF INSULIN (H): ICD-10-CM

## 2024-12-26 DIAGNOSIS — N28.1 RENAL CYST: ICD-10-CM

## 2024-12-26 DIAGNOSIS — Z76.82 ORGAN TRANSPLANT CANDIDATE: ICD-10-CM

## 2024-12-26 DIAGNOSIS — N18.6 ANEMIA IN CHRONIC KIDNEY DISEASE, ON CHRONIC DIALYSIS (H): ICD-10-CM

## 2024-12-26 DIAGNOSIS — D63.1 ANEMIA IN CHRONIC KIDNEY DISEASE, ON CHRONIC DIALYSIS (H): ICD-10-CM

## 2024-12-26 DIAGNOSIS — Z99.2 ANEMIA IN CHRONIC KIDNEY DISEASE, ON CHRONIC DIALYSIS (H): ICD-10-CM

## 2024-12-26 DIAGNOSIS — N18.6 ESRD (END STAGE RENAL DISEASE) (H): Primary | ICD-10-CM

## 2024-12-26 NOTE — TELEPHONE ENCOUNTER
Called patient to follow-up on 12/17/24 CT scan. Transplant nephrology reviewed CT and would like patient to follow-up with urology for further evaluation of renal cysts. Patient verbalized understanding. Smartset order placed.

## 2025-01-07 ENCOUNTER — VIRTUAL VISIT (OUTPATIENT)
Dept: GASTROENTEROLOGY | Facility: CLINIC | Age: 65
End: 2025-01-07
Attending: NURSE PRACTITIONER
Payer: COMMERCIAL

## 2025-01-07 DIAGNOSIS — K86.2 PANCREAS CYST: Primary | ICD-10-CM

## 2025-01-07 DIAGNOSIS — N18.6 ESRD (END STAGE RENAL DISEASE) (H): ICD-10-CM

## 2025-01-07 DIAGNOSIS — Z76.82 ORGAN TRANSPLANT CANDIDATE: ICD-10-CM

## 2025-01-07 NOTE — NURSING NOTE
Current patient location: [address] [place] [address] [address] [place] [address]    Is the patient currently in the state of [place]? YES    Visit mode:VIDEO    If the visit is dropped, the patient can be reconnected by:VIDEO VISIT: Text to cell phone:   Telephone Information:   Mobile [phone]       Will anyone else be joining the visit? NO  (If patient encounters technical issues they should call [phone]    Are changes needed to the allergy or medication list? No    Are refills needed on medications prescribed by this physician? NO    Rooming Documentation:  Questionnaire(s) completed    Reason for visit: Consult    Nikki RUSHINGF

## 2025-01-07 NOTE — LETTER
1/7/2025      Nisreen Mckenna  [address]  [place] [address]      Dear Colleague,    Thank you for referring your patient, Nisreen Mckenna, to the [facility] [place]. Please see a copy of my visit note below.    Virtual Visit Details    Type of service:  Video Visit   Video Start Time:  1:44pm  Video End Time:1:44 PM    Originating Location (pt. Location): Home    Distant Location (provider location):  Off-site  Platform used for Video Visit: [facility]  GASTROENTEROLOGY CONSULT  Nisreen Mckenna [id number]     IMPRESSION:  Nisreen is a 64 year old female with end stage renal disease presumed due to DM2/HTN, HLD, hypothyroidism, and anemia of chronic kidney disease here for consultation of incidentally found pancreatic cysts on renal MRI performed for workup of kidney pancreas transplant.    Reviewed both MR renal wo contrast and CT AP.  No history of pancreatitis, asymptomatic. Morphology consistent with multiple side branch IPMNs, largest being ~2cm. No worrisome features seen. I explained the epidemiology of pancreatic cysts and how common they are. I also explained the pre-malignant nature of IPMNs in general but that the overall risk is actually quite low. I do recommend  surveillance imaging be done in 6 months with MRI/MRCP to ensure stability and at that point if stable then the risk of transformation is exceedingly low.  Discussed ideally this would be performed with contrast, plan to reach out to patient's transplant team to make sure this is okay given her underlying kidney disease.  Patient is agreeable with this plan.     RECOMMENDATIONS:  -- MRI/MRCP (ideally with and without contrast) in 6 months, will plan to reach out to patient's transplant team to confirm okay to use contrast.  Follow-up with me following this.    RTC earlier if unexplained weight loss of > 10 lb, jaundice, diagnosis of acute pancreatitis or chronic diarrhea  lasting more than 2 weeks.     All patient's questions were answered and they agreed with this plan.     It was a pleasure to participate in the care of this patient; please contact us with any further questions.  A total of 60 minutes was spent on the day of the visit, >50% of which was counseling regarding the above delineated issues. The remainder was review of records and imaging as well as documentation and coordination of care.    Michelle Livingston PA-C  Division of Gastroenterology, Hepatology, and Nutrition  [facility]  1/7/2025      OBJECTIVE:  Physical Exam:    Constitutional: There were no vitals taken for this visit.  General: Alert, no distress, well-appearing  HEENT: Atraumatic, normocephalic, sclera anicteric  Respiratory: Breathing unlabored on RA. Able to speak in full sentences without increased effort.   Skin: No jaundice  Neurologic: AAOx3, no obvious neurologic abnormality  Psychiatric: Normal Affect, appropriate mood    Unable to perform further physical exam due to video visit.    IMAGING:  EXAMINATION: CT abdomen and pelvis renal mass protocol, 12/18/2024     CLINICAL HISTORY:  DM type II, ESRD, hypertension. Organ transplant  candidate. Renal mass protocol.     TECHNIQUE: Helical CT images from the lung bases through the iliac  crests were obtained without and with contrast at multiple time points  using a protocol designed to evaluate for renal masses. Imaging was  also extended to include the pelvis following contrast administration.  Contrast dose: 92 mL of Isovue-370     Comparison study: MR abdomen/pelvis 12/2/2024, CT 10/24/2024,  ultrasound 5/20/2024     FINDINGS:     Peritoneal dialysis catheter is coiled in the pelvis with small amount  of associated free fluid.     Right Kidney: Multiple hypodense nonenhancing simple renal cysts.  There is a partially exophytic area with punctate calcifications in  the posterior right lower pole measuring approximately 1.7 x 2.2  cm  (12/161) with central cyst. This appears isoattenuating to the  remainder of the kidney on other images suggesting a lobulation of the  kidney rather than a lesion. Subcentimeter hemorrhagic/proteinaceous  cysts on MRI not well evaluated on CT.  Left Kidney: Multiple nonenhancing simple renal cysts. No suspicious  lesion. Subcentimeter hemorrhagic/proteinaceous cysts on MRI not well  evaluated on CT.     Remainder of abdomen and pelvis:      Liver: Multiple subcentimeter hypodensities which correlate with cysts  seen on MRI. No suspicious lesion.     Gallbladder and biliary tree: Cholecystectomy. No intrahepatic or  extrahepatic biliary dilatation.     Pancreas: Fatty atrophy of the pancreas. 2.1 x 1.2 cm hypodense  nonenhancing lesion is present in the head/uncinate process.  Additional cysts in the pancreas are better visualized on MRI.     Adrenal glands: 14 mm left adrenal nodule is hypodense on noncontrast  phase measuring 13 Hounsfield units. This is not a dedicated adrenal  study therefore 15 minute washout images are not obtained however this  is favored to be an adrenal adenoma. There is some mild thickening of  the left adrenal gland suggesting hyperplasia. No right adrenal  nodule.     Spleen: Normal.     Bowel: No distended or dilated loops of large or small bowel. The  appendix is normal. Trace ascites likely related to peritoneal  dialysis.     Reproductive organs: No pelvic masses. 2.0 cm left ovarian cyst,  stable likely postmenopausal cyst in this age group.     Lung bases: Clear.     Bones and soft tissues: No acute or aggressive osseous lesion. The  peritoneal dialysis catheter in the subcutaneous soft tissues in the  left abdomen demonstrated with thickening in the pelvis.                                                                      IMPRESSION:   1. Numerous bilateral simple renal cysts and subcentimeter cysts many  of which are difficult to characterize on CT  imaging.  Hemorrhagic/proteinaceous cysts on MRI not well visualized on CT.  Consider MRI with contrast or continued 6 month follow-up CT renal  mass protocol for further surveillance.     2. Similar appearance of the 2.1 cm nonenhancing cystic lesion in the  pancreatic head/uncinate process which may represent IPMN. Additional  pancreatic cysts present on MRI, not well visualized/evaluated on CT.  Recommend six-month follow-up with MRI pancreas protocol for further  evaluation.     3. Left adrenal nodule stable, likely adrenal adenoma. Attention on  follow-up imaging.     4. Trace ascites with peritoneal dialysis catheter in place.     5. No acute findings in the abdomen or pelvis.    EXAM: MR RENAL W/O CONTRAST  LOCATION: [facility]  DATE: 12/2/2024     FINDINGS:     RIGHT KIDNEY: No hydronephrosis is present. Multiple T2 hyperintense cystic lesions are seen measuring up to 1.8 cm along the superior pole. A few T1 hyperintense lesions are present measuring up to 6 mm at the mid pole (series 5, image 102).     LEFT KIDNEY: No hydronephrosis is present. Multiple T2 hyperintense cystic lesions are seen in the kidneys with one of the largest measuring 1.4 cm along the mid pole. A septated cystic lesion is also present measuring up to 1.8 cm along the mid pole.   Calcifications seen on the prior CT exam are not well visualized on the current MRI study. A few T1 hyperintense lesions are present measuring up to 6 mm at the mid pole (series 6, image 38).     ADDITIONAL FINDINGS: A few tiny T2 hyperintense lesions are seen in the liver measuring up to 3 mm in the right hepatic lobe. No intrahepatic or extra hepatic biliary ductal ectasia is seen. Gallbladder is surgically absent. Spleen is not enlarged. Loss   of signal is present within the liver and spleen on the T1 weighted in phase images relative to the out of phase images. Mild intra-abdominal ascites is present. Multiple cystic lesions are  seen in the pancreas measuring up to 19 mm along the pancreatic   head (series 11, image 26). Thin internal septation is also noted within this cyst (series 12, image 24). Stomach is distended with ingested contents. Visualized small and large bowel loops are nondilated. Retroperitoneal lymph nodes appear unchanged   from the prior CT exam measuring up to 7 mm in short axis diameter. Multilevel degenerative changes are present in the spine.                                                                      IMPRESSION:  1.  Multiple T2 hyperintense cystic lesions are seen within both kidneys including a minimally complex 1.8 cm cyst along the mid pole of the left kidney with thin internal septation. Characterization of these lesions is limited due to lack of intravenous   contrast. Several T1 hyperintense lesions are also present that may reflect proteinaceous/hemorrhagic cysts. However, characterization is limited due to lack of intravenous contrast and solid lesions such as neoplasm are also in the differential   diagnosis and cannot be excluded. Recommend follow-up MRI examination with intravenous contrast for further characterization. If MRI contrast cannot be given, CT renal exam with and without contrast could also be considered. If MRI or CT contrast cannot   be administered, short-term follow-up exam is recommended to evaluate for stability in lesion size.  2.  Multiple T2 hyperintense pancreatic cystic lesions measuring up to 19 mm along the pancreatic head. Characterization is limited due to lack of intravenous contrast. Recommend follow-up examination in 6 months.  3.  Mild intra-abdominal ascites.  4.  Loss of signal seen on the T1 weighted in phase images relative to the out of phase images involving the liver and spleen. Findings are suggestive of iron deposition in the liver and spleen    SUBJECTIVE:  Nisreen is a 64 year old female with end stage renal disease presumed due to DM2/HTN, HLD,  hypothyroidism, and anemia of chronic kidney disease here for consultation of incidentally found on renal MRI performed for workup of kidney pancreas transplant.    Initial imaging:  Renal MRI without contrast 12-24   Multiple cystic lesions are seen in the pancreas measuring up to 19 mm along the pancreatic   head (series 11, image 26). Thin internal septation is also noted within this cyst (series 12, image 24).     Follow up imaging:   CT abdomen and pelvis with and without contrast   Fatty atrophy of the pancreas. 2.1 x 1.2 cm hypodense  nonenhancing lesion is present in the head/uncinate process.  Additional cysts in the pancreas are better visualized on MRI.    Patient denies any current GI symptoms including see us in abdominal pain, jaundice, frequent diarrhea, or unintentional weight loss.    Patient denies any family history of pancreatic disease or personal history of pancreatitis.    Never smoker. No alcohol use.       Again, thank you for allowing me to participate in the care of your patient.        Sincerely,        Michelle Livingston PA-C    Electronically signed

## 2025-01-07 NOTE — PROGRESS NOTES
Virtual Visit Details    Type of service:  Video Visit   Video Start Time:  1:44pm  Video End Time:1:44 PM    Originating Location (pt. Location): Home    Distant Location (provider location):  Off-site  Platform used for Video Visit: [employer]

## 2025-01-07 NOTE — PROGRESS NOTES
[facility]  GASTROENTEROLOGY CONSULT  Nisreen Mckenna [id number]     IMPRESSION:  Nisreen is a 64 year old female with end stage renal disease presumed due to DM2/HTN, HLD, hypothyroidism, and anemia of chronic kidney disease here for consultation of incidentally found pancreatic cysts on renal MRI performed for workup of kidney pancreas transplant.    Reviewed both MR renal wo contrast and CT AP.  No history of pancreatitis, asymptomatic. Morphology consistent with multiple side branch IPMNs, largest being ~2cm. No worrisome features seen. I explained the epidemiology of pancreatic cysts and how common they are. I also explained the pre-malignant nature of IPMNs in general but that the overall risk is actually quite low. I do recommend  surveillance imaging be done in 6 months with MRI/MRCP to ensure stability and at that point if stable then the risk of transformation is exceedingly low.  Discussed ideally this would be performed with contrast, plan to reach out to patient's transplant team to make sure this is okay given her underlying kidney disease.  Patient is agreeable with this plan.     RECOMMENDATIONS:  -- MRI/MRCP (ideally with and without contrast) in 6 months, will plan to reach out to patient's transplant team to confirm okay to use contrast.  Follow-up with me following this.    RTC earlier if unexplained weight loss of > 10 lb, jaundice, diagnosis of acute pancreatitis or chronic diarrhea lasting more than 2 weeks.     All patient's questions were answered and they agreed with this plan.     It was a pleasure to participate in the care of this patient; please contact us with any further questions.  A total of 60 minutes was spent on the day of the visit, >50% of which was counseling regarding the above delineated issues. The remainder was review of records and imaging as well as documentation and coordination of care.    Michelle Livingston PA-C  Division of Gastroenterology, Hepatology, and  Nutrition  [facility]  1/7/2025      OBJECTIVE:  Physical Exam:    Constitutional: There were no vitals taken for this visit.  General: Alert, no distress, well-appearing  HEENT: Atraumatic, normocephalic, sclera anicteric  Respiratory: Breathing unlabored on RA. Able to speak in full sentences without increased effort.   Skin: No jaundice  Neurologic: AAOx3, no obvious neurologic abnormality  Psychiatric: Normal Affect, appropriate mood    Unable to perform further physical exam due to video visit.    IMAGING:  EXAMINATION: CT abdomen and pelvis renal mass protocol, 12/18/2024     CLINICAL HISTORY:  DM type II, ESRD, hypertension. Organ transplant  candidate. Renal mass protocol.     TECHNIQUE: Helical CT images from the lung bases through the iliac  crests were obtained without and with contrast at multiple time points  using a protocol designed to evaluate for renal masses. Imaging was  also extended to include the pelvis following contrast administration.  Contrast dose: 92 mL of Isovue-370     Comparison study: MR abdomen/pelvis 12/2/2024, CT 10/24/2024,  ultrasound 5/20/2024     FINDINGS:     Peritoneal dialysis catheter is coiled in the pelvis with small amount  of associated free fluid.     Right Kidney: Multiple hypodense nonenhancing simple renal cysts.  There is a partially exophytic area with punctate calcifications in  the posterior right lower pole measuring approximately 1.7 x 2.2 cm  (12/161) with central cyst. This appears isoattenuating to the  remainder of the kidney on other images suggesting a lobulation of the  kidney rather than a lesion. Subcentimeter hemorrhagic/proteinaceous  cysts on MRI not well evaluated on CT.  Left Kidney: Multiple nonenhancing simple renal cysts. No suspicious  lesion. Subcentimeter hemorrhagic/proteinaceous cysts on MRI not well  evaluated on CT.     Remainder of abdomen and pelvis:      Liver: Multiple subcentimeter hypodensities which correlate with  cysts  seen on MRI. No suspicious lesion.     Gallbladder and biliary tree: Cholecystectomy. No intrahepatic or  extrahepatic biliary dilatation.     Pancreas: Fatty atrophy of the pancreas. 2.1 x 1.2 cm hypodense  nonenhancing lesion is present in the head/uncinate process.  Additional cysts in the pancreas are better visualized on MRI.     Adrenal glands: 14 mm left adrenal nodule is hypodense on noncontrast  phase measuring 13 Hounsfield units. This is not a dedicated adrenal  study therefore 15 minute washout images are not obtained however this  is favored to be an adrenal adenoma. There is some mild thickening of  the left adrenal gland suggesting hyperplasia. No right adrenal  nodule.     Spleen: Normal.     Bowel: No distended or dilated loops of large or small bowel. The  appendix is normal. Trace ascites likely related to peritoneal  dialysis.     Reproductive organs: No pelvic masses. 2.0 cm left ovarian cyst,  stable likely postmenopausal cyst in this age group.     Lung bases: Clear.     Bones and soft tissues: No acute or aggressive osseous lesion. The  peritoneal dialysis catheter in the subcutaneous soft tissues in the  left abdomen demonstrated with thickening in the pelvis.                                                                      IMPRESSION:   1. Numerous bilateral simple renal cysts and subcentimeter cysts many  of which are difficult to characterize on CT imaging.  Hemorrhagic/proteinaceous cysts on MRI not well visualized on CT.  Consider MRI with contrast or continued 6 month follow-up CT renal  mass protocol for further surveillance.     2. Similar appearance of the 2.1 cm nonenhancing cystic lesion in the  pancreatic head/uncinate process which may represent IPMN. Additional  pancreatic cysts present on MRI, not well visualized/evaluated on CT.  Recommend six-month follow-up with MRI pancreas protocol for further  evaluation.     3. Left adrenal nodule stable, likely adrenal  adenoma. Attention on  follow-up imaging.     4. Trace ascites with peritoneal dialysis catheter in place.     5. No acute findings in the abdomen or pelvis.    EXAM: MR RENAL W/O CONTRAST  LOCATION: [facility]  DATE: 12/2/2024     FINDINGS:     RIGHT KIDNEY: No hydronephrosis is present. Multiple T2 hyperintense cystic lesions are seen measuring up to 1.8 cm along the superior pole. A few T1 hyperintense lesions are present measuring up to 6 mm at the mid pole (series 5, image 102).     LEFT KIDNEY: No hydronephrosis is present. Multiple T2 hyperintense cystic lesions are seen in the kidneys with one of the largest measuring 1.4 cm along the mid pole. A septated cystic lesion is also present measuring up to 1.8 cm along the mid pole.   Calcifications seen on the prior CT exam are not well visualized on the current MRI study. A few T1 hyperintense lesions are present measuring up to 6 mm at the mid pole (series 6, image 38).     ADDITIONAL FINDINGS: A few tiny T2 hyperintense lesions are seen in the liver measuring up to 3 mm in the right hepatic lobe. No intrahepatic or extra hepatic biliary ductal ectasia is seen. Gallbladder is surgically absent. Spleen is not enlarged. Loss   of signal is present within the liver and spleen on the T1 weighted in phase images relative to the out of phase images. Mild intra-abdominal ascites is present. Multiple cystic lesions are seen in the pancreas measuring up to 19 mm along the pancreatic   head (series 11, image 26). Thin internal septation is also noted within this cyst (series 12, image 24). Stomach is distended with ingested contents. Visualized small and large bowel loops are nondilated. Retroperitoneal lymph nodes appear unchanged   from the prior CT exam measuring up to 7 mm in short axis diameter. Multilevel degenerative changes are present in the spine.                                                                      IMPRESSION:  1.   Multiple T2 hyperintense cystic lesions are seen within both kidneys including a minimally complex 1.8 cm cyst along the mid pole of the left kidney with thin internal septation. Characterization of these lesions is limited due to lack of intravenous   contrast. Several T1 hyperintense lesions are also present that may reflect proteinaceous/hemorrhagic cysts. However, characterization is limited due to lack of intravenous contrast and solid lesions such as neoplasm are also in the differential   diagnosis and cannot be excluded. Recommend follow-up MRI examination with intravenous contrast for further characterization. If MRI contrast cannot be given, CT renal exam with and without contrast could also be considered. If MRI or CT contrast cannot   be administered, short-term follow-up exam is recommended to evaluate for stability in lesion size.  2.  Multiple T2 hyperintense pancreatic cystic lesions measuring up to 19 mm along the pancreatic head. Characterization is limited due to lack of intravenous contrast. Recommend follow-up examination in 6 months.  3.  Mild intra-abdominal ascites.  4.  Loss of signal seen on the T1 weighted in phase images relative to the out of phase images involving the liver and spleen. Findings are suggestive of iron deposition in the liver and spleen    SUBJECTIVE:  Nisreen is a 64 year old female with end stage renal disease presumed due to DM2/HTN, HLD, hypothyroidism, and anemia of chronic kidney disease here for consultation of incidentally found on renal MRI performed for workup of kidney pancreas transplant.    Initial imaging:  Renal MRI without contrast 12-24   Multiple cystic lesions are seen in the pancreas measuring up to 19 mm along the pancreatic   head (series 11, image 26). Thin internal septation is also noted within this cyst (series 12, image 24).     Follow up imaging:   CT abdomen and pelvis with and without contrast   Fatty atrophy of the pancreas. 2.1 x 1.2 cm  hypodense  nonenhancing lesion is present in the head/uncinate process.  Additional cysts in the pancreas are better visualized on MRI.    Patient denies any current GI symptoms including see us in abdominal pain, jaundice, frequent diarrhea, or unintentional weight loss.    Patient denies any family history of pancreatic disease or personal history of pancreatitis.    Never smoker. No alcohol use.

## 2025-01-07 NOTE — PATIENT INSTRUCTIONS
It was a pleasure meeting with you today and discussing your healthcare plan. Below is a summary of what we covered:    -- Recommend that we repeat an MRI scan ideally with contrast in 6 months, will plan to reach out to your transplant team to confirm that using contrast is okay.  Follow-up with me 1 to 2 weeks after repeat MRI.      Please call my office at [phone] if you have any questions.       See below for any additional questions and scheduling guidelines.    Sign up for [email or web address]: [email or web address] patient portal serves as a secure platform for accessing your medical records from the [facility]. Additionally, [email or web address] facilitates easy, timely, and secure messaging with your care team. If you have not signed up, you may do so by using the provided code or calling [phone].    Coordinating your care after your visit:  There are multiple options for scheduling your follow-up care based on your provider's recommendation.    How do I schedule a follow-up clinic appointment:   After your appointment, you may receive scheduling assistance with the Clinic Coordinators by having a seat in the waiting room and a Clinic Coordinator will call you up to schedule.  Virtual visits or after you leave the clinic:  Your provider has placed a follow-up order in the [email or web address] portal for scheduling your return appointment. A member of the scheduling team will contact you to schedule.  [employer] Scheduling: Timely scheduling through [email or web address] is advised to ensure appointment availability.   Call to schedule: You may schedule your follow-up appointment(s) by calling [phone], option 1.    How do I schedule my endoscopy or colonoscopy procedure:  If a procedure, such as a colonoscopy or upper endoscopy was ordered by your provider, the scheduling team will contact you to schedule this procedure. Or you may choose to call to schedule at   [phone], option 2.  Please allow 20-30 minutes when scheduling a  procedure.    How do I get my blood work done? To get your blood work done, you need to schedule a lab appointment at an [facility]. There are multiple ways to schedule:   At the clinic: The Clinic Coordinator you meet after your visit can help you schedule a lab appointment.   [employer] scheduling: [employer] offers online lab scheduling at all [facility] laboratory locations.   Call to schedule: You can call [phone] to schedule your lab appointment.    How do I schedule my imaging study: To schedule imaging studies, such as CT scans, ultrasounds, MRIs, or X-rays, contact Imaging Services at [phone].    How do I schedule a referral to another doctor: If your provider recommended a referral to another specialist(s), the referral order was placed by your provider. You will receive a phone call to schedule this referral, or you may choose to call the number attached to the referral to self-schedule.    For Post-Visit Question(s):  For any inquiries following today's visit:  Please utilize [employer] messaging and allow 48 hours for reply or contact the Call Center during normal business hours at [phone], option 3.  For Emergent After-hours questions, contact the On-Call GI Fellow through the [facility]  at [phone].  In addition, you may contact your Nurse directly using the provided contact information.    Test Results:  Test results will be accessible via [employer] in compliance with the 21st Century Cures Act. This means that your results will be available to you at the same time as your provider. Often you may see your results before your provider does. Results are reviewed by staff within two weeks with communication follow-up. Results may be released in the patient portal prior to your care team review.    Prescription Refill(s):  Medication prescribed by your provider will be addressed during your visit. For future refills, please coordinate with your  pharmacy. If you have not had a recent clinic visit or routine labs, for your safety, your provider may not be able to refill your prescription.         Sincerely,    Michelle Livingston PA-C  Division of Gastroenterology, Hepatology, and Nutrition  [facility]

## 2025-01-09 ENCOUNTER — OFFICE VISIT (OUTPATIENT)
Dept: UROLOGY | Facility: CLINIC | Age: 65
End: 2025-01-09
Payer: COMMERCIAL

## 2025-01-09 VITALS
DIASTOLIC BLOOD PRESSURE: 56 MMHG | HEIGHT: 62 IN | WEIGHT: 174 LBS | SYSTOLIC BLOOD PRESSURE: 128 MMHG | BODY MASS INDEX: 32.02 KG/M2

## 2025-01-09 DIAGNOSIS — D63.1 ANEMIA IN CHRONIC KIDNEY DISEASE, ON CHRONIC DIALYSIS (H): ICD-10-CM

## 2025-01-09 DIAGNOSIS — N28.1 RENAL CYST: ICD-10-CM

## 2025-01-09 DIAGNOSIS — N18.6 ESRD (END STAGE RENAL DISEASE) (H): ICD-10-CM

## 2025-01-09 DIAGNOSIS — Z99.2 ANEMIA IN CHRONIC KIDNEY DISEASE, ON CHRONIC DIALYSIS (H): ICD-10-CM

## 2025-01-09 DIAGNOSIS — Z79.4 TYPE 2 DIABETES MELLITUS WITH DIABETIC NEPHROPATHY, WITH LONG-TERM CURRENT USE OF INSULIN (H): ICD-10-CM

## 2025-01-09 DIAGNOSIS — E11.21 TYPE 2 DIABETES MELLITUS WITH DIABETIC NEPHROPATHY, WITH LONG-TERM CURRENT USE OF INSULIN (H): ICD-10-CM

## 2025-01-09 DIAGNOSIS — N18.6 ANEMIA IN CHRONIC KIDNEY DISEASE, ON CHRONIC DIALYSIS (H): ICD-10-CM

## 2025-01-09 DIAGNOSIS — Z76.82 ORGAN TRANSPLANT CANDIDATE: ICD-10-CM

## 2025-01-09 ASSESSMENT — PAIN SCALES - GENERAL: PAINLEVEL_OUTOF10: NO PAIN (0)

## 2025-01-09 NOTE — LETTER
1/9/2025       RE: Nisreen Mckenna  [address]  [place] [address]     Dear Colleague,    Thank you for referring your patient, Nisreen Mkcenna, to the [facility] [place] at [facility]. Please see a copy of my visit note below.          Chief Complaint:    Renal Mass  Bilateral kidney cysts           Consult or Referral:     Mr. Nisreen Mckenna is a 64 year old female seen at the request of Dr. Brink.         History of Present Illness:     Nisreen Mckenna is a 64 year old female being seen for evaluation of kidney cyst/complex kidney cysts.  Duration of problem: Few months  Previous treatments: On peritoneal dialysis waiting for renal transplant      Reviewed previous notes from Dr. Deepak Tolbert    Is waiting for kidney transplant  She is dialysis dependent due to CKD because of long-term diabetes  She has no flank pain or hematuria             Past Medical History:     Past Medical History:   Diagnosis Date     ESRD (end stage renal disease) on dialysis (H)      HTN (hypertension)      Hypothyroidism      Type 2 diabetes mellitus (H)             Past Surgical History:     Past Surgical History:   Procedure Laterality Date     IR CVC TUNNEL PLACEMENT > 5 YRS OF AGE  6/21/2024     IR CVC TUNNEL REMOVAL RIGHT  9/3/2024     IR CVC TUNNEL REVISION RIGHT  7/26/2024            Medications     Current Outpatient Medications   Medication Sig Dispense Refill     amLODIPine (NORVASC) 10 MG tablet Take 10 mg by mouth daily       atorvastatin (LIPITOR) 40 MG tablet Take 40 mg by mouth daily       bumetanide (BUMEX) 2 MG tablet Take 2 mg by mouth 2 times daily       calcitRIOL (ROCALTROL) 0.25 MCG capsule Take 1 capsule (0.25 mcg) by mouth daily 30 capsule 0     hydrALAZINE (APRESOLINE) 25 MG tablet Take 25 mg by mouth 3 times daily       insulin glargine (LANTUS PEN) 100 UNIT/ML pen Inject subcutaneously at bedtime        levothyroxine (SYNTHROID/LEVOTHROID) 175 MCG tablet Take 175 mcg by mouth daily       vitamin D3 (CHOLECALCIFEROL) 50 mcg (2000 units) tablet Take 1 tablet by mouth daily       No current facility-administered medications for this visit.            Family History:   History reviewed. No pertinent family history.         Social History:     Social History     Socioeconomic History     Marital status:      Spouse name: Not on file     Number of children: Not on file     Years of education: Not on file     Highest education level: Not on file   Occupational History     Not on file   Tobacco Use     Smoking status: Never     Smokeless tobacco: Never   Substance and Sexual Activity     Alcohol use: Never     Drug use: Never     Sexual activity: Not on file   Other Topics Concern     Not on file   Social History Narrative     Not on file     Social Drivers of Health     Financial Resource Strain: Not on file   Food Insecurity: Not on file   Transportation Needs: Not on file   Physical Activity: Not on file   Stress: Not on file   Social Connections: Not on file   Interpersonal Safety: Low Risk  (9/3/2024)    Interpersonal Safety      Do you feel physically and emotionally safe where you currently live?: Yes      Within the past 12 months, have you been hit, slapped, kicked or otherwise physically hurt by someone?: No      Within the past 12 months, have you been humiliated or emotionally abused in other ways by your partner or ex-partner?: No   Housing Stability: Not on file            Allergies:   Patient has no known allergies.         Review of Systems:  From intake questionnaire     Skin: negative  Eyes: negative  Ears/Nose/Throat: negative  Respiratory: No shortness of breath, dyspnea on exertion, cough, or hemoptysis  Cardiovascular: No chest pain or palpitations  Gastrointestinal: negative; no nausea/vomiting, constipation or diarrhea  Genitourinary: as per HPI  Musculoskeletal: negative  Neurologic:  "negative  Psychiatric: negative  Hematologic/Lymphatic/Immunologic: negative  Endocrine: negative         Physical Exam:     Patient is a 64 year old  female   Vitals: Blood pressure 128/56, height 1.575 m (5' 2\"), weight 78.9 kg (174 lb).  Constitutional: Body mass index is 31.83 kg/m .  Alert, no acute distress, oriented, conversant  Eyes: no scleral icterus; extraocular muscles intact, moist conjunctivae  Neck: trachea midline, no thyromegaly  Ears/nose/mouth: throat/mouth:normal, good dentition  Respiratory: no respiratory distress, or pursed lip breathing  Cardiovascular: pulses strong and intact; no obvious jugular venous distension present  Gastrointestinal: soft, nontender, no organomegaly or masses,   Lymphatics: No inguinal adenopathy  Musculoskeletal: extremities normal, no peripheral edema  Skin: no suspicious lesions or rashes  Neuro: Alert, oriented, speech and mentation normal  Psych: affect and mood normal, alert and oriented to person, place and time  Gait: Normal  : deferred      Labs and Pathology:    The following labs were reviewed by me and discussed with the patient:    Significant for   Lab Results   Component Value Date    CR 4.87 10/03/2024    CR 2.94 06/23/2024    CR 4.91 06/21/2024    CR 4.65 06/20/2024    CR 4.75 06/19/2024    CR 4.09 05/22/2024    CR 4.05 05/21/2024    CR 3.62 05/20/2024    CR 3.77 05/20/2024    CR 3.96 05/20/2024     No results found for: \"PSA\"          Imaging:    The following imaging exams were independently viewed and interpreted by me and discussed with patient:  CT Scan Abd/Pelvis:   Narrative & Impression   EXAMINATION: CT abdomen and pelvis renal mass protocol, 12/18/2024     CLINICAL HISTORY:  DM type II, ESRD, hypertension. Organ transplant  candidate. Renal mass protocol.     TECHNIQUE: Helical CT images from the lung bases through the iliac  crests were obtained without and with contrast at multiple time points  using a protocol designed to evaluate for " renal masses. Imaging was  also extended to include the pelvis following contrast administration.  Contrast dose: 92 mL of Isovue-370     Comparison study: MR abdomen/pelvis 12/2/2024, CT 10/24/2024,  ultrasound 5/20/2024     FINDINGS:     Peritoneal dialysis catheter is coiled in the pelvis with small amount  of associated free fluid.     Right Kidney: Multiple hypodense nonenhancing simple renal cysts.  There is a partially exophytic area with punctate calcifications in  the posterior right lower pole measuring approximately 1.7 x 2.2 cm  (12/161) with central cyst. This appears isoattenuating to the  remainder of the kidney on other images suggesting a lobulation of the  kidney rather than a lesion. Subcentimeter hemorrhagic/proteinaceous  cysts on MRI not well evaluated on CT.  Left Kidney: Multiple nonenhancing simple renal cysts. No suspicious  lesion. Subcentimeter hemorrhagic/proteinaceous cysts on MRI not well  evaluated on CT.     Remainder of abdomen and pelvis:      Liver: Multiple subcentimeter hypodensities which correlate with cysts  seen on MRI. No suspicious lesion.     Gallbladder and biliary tree: Cholecystectomy. No intrahepatic or  extrahepatic biliary dilatation.     Pancreas: Fatty atrophy of the pancreas. 2.1 x 1.2 cm hypodense  nonenhancing lesion is present in the head/uncinate process.  Additional cysts in the pancreas are better visualized on MRI.     Adrenal glands: 14 mm left adrenal nodule is hypodense on noncontrast  phase measuring 13 Hounsfield units. This is not a dedicated adrenal  study therefore 15 minute washout images are not obtained however this  is favored to be an adrenal adenoma. There is some mild thickening of  the left adrenal gland suggesting hyperplasia. No right adrenal  nodule.     Spleen: Normal.     Bowel: No distended or dilated loops of large or small bowel. The  appendix is normal. Trace ascites likely related to peritoneal  dialysis.     Reproductive organs:  No pelvic masses. 2.0 cm left ovarian cyst,  stable likely postmenopausal cyst in this age group.     Lung bases: Clear.     Bones and soft tissues: No acute or aggressive osseous lesion. The  peritoneal dialysis catheter in the subcutaneous soft tissues in the  left abdomen demonstrated with thickening in the pelvis.                                                                      IMPRESSION:   1. Numerous bilateral simple renal cysts and subcentimeter cysts many  of which are difficult to characterize on CT imaging.  Hemorrhagic/proteinaceous cysts on MRI not well visualized on CT.  Consider MRI with contrast or continued 6 month follow-up CT renal  mass protocol for further surveillance.     2. Similar appearance of the 2.1 cm nonenhancing cystic lesion in the  pancreatic head/uncinate process which may represent IPMN. Additional  pancreatic cysts present on MRI, not well visualized/evaluated on CT.  Recommend six-month follow-up with MRI pancreas protocol for further  evaluation.     3. Left adrenal nodule stable, likely adrenal adenoma. Attention on  follow-up imaging.     4. Trace ascites with peritoneal dialysis catheter in place.     5. No acute findings in the abdomen or pelvis.        MRI Abd/Pelvis: EXAM: MR RENAL W/O CONTRAST  LOCATION: [facility]  DATE: 12/2/2024     INDICATION: Multiple anechoic (likely a combination of simple and mildly complex) bilateral renal cortical cysts noted on Renal US from 11 6 2024. Pt currently being evaluated for Kidney Pancreas Transplant.  COMPARISON: Correlate with CT abdomen and pelvis performed on 10/24/2024  TECHNIQUE: Multiplanar MRI renal protocol including T1 in/out phase, diffusion, multiplane T2.     FINDINGS:     RIGHT KIDNEY: No hydronephrosis is present. Multiple T2 hyperintense cystic lesions are seen measuring up to 1.8 cm along the superior pole. A few T1 hyperintense lesions are present measuring up to 6 mm at the mid pole  (series 5, image 102).     LEFT KIDNEY: No hydronephrosis is present. Multiple T2 hyperintense cystic lesions are seen in the kidneys with one of the largest measuring 1.4 cm along the mid pole. A septated cystic lesion is also present measuring up to 1.8 cm along the mid pole.   Calcifications seen on the prior CT exam are not well visualized on the current MRI study. A few T1 hyperintense lesions are present measuring up to 6 mm at the mid pole (series 6, image 38).     ADDITIONAL FINDINGS: A few tiny T2 hyperintense lesions are seen in the liver measuring up to 3 mm in the right hepatic lobe. No intrahepatic or extra hepatic biliary ductal ectasia is seen. Gallbladder is surgically absent. Spleen is not enlarged. Loss   of signal is present within the liver and spleen on the T1 weighted in phase images relative to the out of phase images. Mild intra-abdominal ascites is present. Multiple cystic lesions are seen in the pancreas measuring up to 19 mm along the pancreatic   head (series 11, image 26). Thin internal septation is also noted within this cyst (series 12, image 24). Stomach is distended with ingested contents. Visualized small and large bowel loops are nondilated. Retroperitoneal lymph nodes appear unchanged   from the prior CT exam measuring up to 7 mm in short axis diameter. Multilevel degenerative changes are present in the spine.                                                                      IMPRESSION:  1.  Multiple T2 hyperintense cystic lesions are seen within both kidneys including a minimally complex 1.8 cm cyst along the mid pole of the left kidney with thin internal septation. Characterization of these lesions is limited due to lack of intravenous   contrast. Several T1 hyperintense lesions are also present that may reflect proteinaceous/hemorrhagic cysts. However, characterization is limited due to lack of intravenous contrast and solid lesions such as neoplasm are also in the  differential   diagnosis and cannot be excluded. Recommend follow-up MRI examination with intravenous contrast for further characterization. If MRI contrast cannot be given, CT renal exam with and without contrast could also be considered. If MRI or CT contrast cannot   be administered, short-term follow-up exam is recommended to evaluate for stability in lesion size.  2.  Multiple T2 hyperintense pancreatic cystic lesions measuring up to 19 mm along the pancreatic head. Characterization is limited due to lack of intravenous contrast. Recommend follow-up examination in 6 months.  3.  Mild intra-abdominal ascites.  4.  Loss of signal seen on the T1 weighted in phase images relative to the out of phase images involving the liver and spleen. Findings are suggestive of iron deposition in the liver and spleen.                  Assessment and Plan:     ESRD (end stage renal disease) (H)  Undergoing peritoneal dialysis  - Adult Urology [facility] Referral    Organ transplant candidate  Currently being seen by us for clearance for transplantation  - Adult Urology [facility] Referral    Type 2 diabetes mellitus with diabetic nephropathy, with long-term current use of insulin (H)  Long-term diabetic  - Adult Urology [facility] Referral        Renal cyst  Bilateral multiple small renal cyst maximum size is 1.8 cm on either side  Based on the evaluation of the MRI and the CT scan I do not see a significant solid component on any of those cysts specially on the 1 that was septations on the left side  At this time based on the evaluations I do not think we are concerned about any solid renal neoplasm.  I would recommend that she could go ahead with kidney transplantation from urology perspective  Following transplantation she can see us in 1 year with renal MRI with and without contrast  - Adult Urology [facility] Referral      Plan:  Okay for transplantation  Follow-up in 1 year with MRI renal protocol with and without  contrast    Orders  No orders of the defined types were placed in this encounter.      Gurjit Drew MD  [facility] [place]      ==========================    Additional Billing and Coding Information:  Review of external notes as documented above   Review of the result(s) of each unique test - MRI, CT, creatinine                15 minutes spent by me on the date of the encounter doing chart review, review of test results, interpretation of tests, patient visit, and documentation     ==========================      Again, thank you for allowing me to participate in the care of your patient.      Sincerely,    Gurjit Drew MD

## 2025-01-09 NOTE — PROGRESS NOTES
Chief Complaint:    Renal Mass  Bilateral kidney cysts           Consult or Referral:     Mr. Nisreen Mckenna is a 64 year old female seen at the request of Dr. Brink.         History of Present Illness:     Nisreen Mckenna is a 64 year old female being seen for evaluation of kidney cyst/complex kidney cysts.  Duration of problem: Few months  Previous treatments: On peritoneal dialysis waiting for renal transplant      Reviewed previous notes from Dr. Deepak Tolbert    Is waiting for kidney transplant  She is dialysis dependent due to CKD because of long-term diabetes  She has no flank pain or hematuria             Past Medical History:     Past Medical History:   Diagnosis Date    ESRD (end stage renal disease) on dialysis (H)     HTN (hypertension)     Hypothyroidism     Type 2 diabetes mellitus (H)             Past Surgical History:     Past Surgical History:   Procedure Laterality Date    IR CVC TUNNEL PLACEMENT > 5 YRS OF AGE  6/21/2024    IR CVC TUNNEL REMOVAL RIGHT  9/3/2024    IR CVC TUNNEL REVISION RIGHT  7/26/2024            Medications     Current Outpatient Medications   Medication Sig Dispense Refill    amLODIPine (NORVASC) 10 MG tablet Take 10 mg by mouth daily      atorvastatin (LIPITOR) 40 MG tablet Take 40 mg by mouth daily      bumetanide (BUMEX) 2 MG tablet Take 2 mg by mouth 2 times daily      calcitRIOL (ROCALTROL) 0.25 MCG capsule Take 1 capsule (0.25 mcg) by mouth daily 30 capsule 0    hydrALAZINE (APRESOLINE) 25 MG tablet Take 25 mg by mouth 3 times daily      insulin glargine (LANTUS PEN) 100 UNIT/ML pen Inject subcutaneously at bedtime      levothyroxine (SYNTHROID/LEVOTHROID) 175 MCG tablet Take 175 mcg by mouth daily      vitamin D3 (CHOLECALCIFEROL) 50 mcg (2000 units) tablet Take 1 tablet by mouth daily       No current facility-administered medications for this visit.            Family History:   History reviewed. No pertinent family history.         Social History:  "    Social History     Socioeconomic History    Marital status:      Spouse name: Not on file    Number of children: Not on file    Years of education: Not on file    Highest education level: Not on file   Occupational History    Not on file   Tobacco Use    Smoking status: Never    Smokeless tobacco: Never   Substance and Sexual Activity    Alcohol use: Never    Drug use: Never    Sexual activity: Not on file   Other Topics Concern    Not on file   Social History Narrative    Not on file     Social Drivers of Health     Financial Resource Strain: Not on file   Food Insecurity: Not on file   Transportation Needs: Not on file   Physical Activity: Not on file   Stress: Not on file   Social Connections: Not on file   Interpersonal Safety: Low Risk  (9/3/2024)    Interpersonal Safety     Do you feel physically and emotionally safe where you currently live?: Yes     Within the past 12 months, have you been hit, slapped, kicked or otherwise physically hurt by someone?: No     Within the past 12 months, have you been humiliated or emotionally abused in other ways by your partner or ex-partner?: No   Housing Stability: Not on file            Allergies:   Patient has no known allergies.         Review of Systems:  From intake questionnaire     Skin: negative  Eyes: negative  Ears/Nose/Throat: negative  Respiratory: No shortness of breath, dyspnea on exertion, cough, or hemoptysis  Cardiovascular: No chest pain or palpitations  Gastrointestinal: negative; no nausea/vomiting, constipation or diarrhea  Genitourinary: as per HPI  Musculoskeletal: negative  Neurologic: negative  Psychiatric: negative  Hematologic/Lymphatic/Immunologic: negative  Endocrine: negative         Physical Exam:     Patient is a 64 year old  female   Vitals: Blood pressure 128/56, height 1.575 m (5' 2\"), weight 78.9 kg (174 lb).  Constitutional: Body mass index is 31.83 kg/m .  Alert, no acute distress, oriented, conversant  Eyes: no scleral " "icterus; extraocular muscles intact, moist conjunctivae  Neck: trachea midline, no thyromegaly  Ears/nose/mouth: throat/mouth:normal, good dentition  Respiratory: no respiratory distress, or pursed lip breathing  Cardiovascular: pulses strong and intact; no obvious jugular venous distension present  Gastrointestinal: soft, nontender, no organomegaly or masses,   Lymphatics: No inguinal adenopathy  Musculoskeletal: extremities normal, no peripheral edema  Skin: no suspicious lesions or rashes  Neuro: Alert, oriented, speech and mentation normal  Psych: affect and mood normal, alert and oriented to person, place and time  Gait: Normal  : deferred      Labs and Pathology:    The following labs were reviewed by me and discussed with the patient:    Significant for   Lab Results   Component Value Date    CR 4.87 10/03/2024    CR 2.94 06/23/2024    CR 4.91 06/21/2024    CR 4.65 06/20/2024    CR 4.75 06/19/2024    CR 4.09 05/22/2024    CR 4.05 05/21/2024    CR 3.62 05/20/2024    CR 3.77 05/20/2024    CR 3.96 05/20/2024     No results found for: \"PSA\"          Imaging:    The following imaging exams were independently viewed and interpreted by me and discussed with patient:  CT Scan Abd/Pelvis:   Narrative & Impression   EXAMINATION: CT abdomen and pelvis renal mass protocol, 12/18/2024     CLINICAL HISTORY:  DM type II, ESRD, hypertension. Organ transplant  candidate. Renal mass protocol.     TECHNIQUE: Helical CT images from the lung bases through the iliac  crests were obtained without and with contrast at multiple time points  using a protocol designed to evaluate for renal masses. Imaging was  also extended to include the pelvis following contrast administration.  Contrast dose: 92 mL of Isovue-370     Comparison study: MR abdomen/pelvis 12/2/2024, CT 10/24/2024,  ultrasound 5/20/2024     FINDINGS:     Peritoneal dialysis catheter is coiled in the pelvis with small amount  of associated free fluid.     Right Kidney: " Multiple hypodense nonenhancing simple renal cysts.  There is a partially exophytic area with punctate calcifications in  the posterior right lower pole measuring approximately 1.7 x 2.2 cm  (12/161) with central cyst. This appears isoattenuating to the  remainder of the kidney on other images suggesting a lobulation of the  kidney rather than a lesion. Subcentimeter hemorrhagic/proteinaceous  cysts on MRI not well evaluated on CT.  Left Kidney: Multiple nonenhancing simple renal cysts. No suspicious  lesion. Subcentimeter hemorrhagic/proteinaceous cysts on MRI not well  evaluated on CT.     Remainder of abdomen and pelvis:      Liver: Multiple subcentimeter hypodensities which correlate with cysts  seen on MRI. No suspicious lesion.     Gallbladder and biliary tree: Cholecystectomy. No intrahepatic or  extrahepatic biliary dilatation.     Pancreas: Fatty atrophy of the pancreas. 2.1 x 1.2 cm hypodense  nonenhancing lesion is present in the head/uncinate process.  Additional cysts in the pancreas are better visualized on MRI.     Adrenal glands: 14 mm left adrenal nodule is hypodense on noncontrast  phase measuring 13 Hounsfield units. This is not a dedicated adrenal  study therefore 15 minute washout images are not obtained however this  is favored to be an adrenal adenoma. There is some mild thickening of  the left adrenal gland suggesting hyperplasia. No right adrenal  nodule.     Spleen: Normal.     Bowel: No distended or dilated loops of large or small bowel. The  appendix is normal. Trace ascites likely related to peritoneal  dialysis.     Reproductive organs: No pelvic masses. 2.0 cm left ovarian cyst,  stable likely postmenopausal cyst in this age group.     Lung bases: Clear.     Bones and soft tissues: No acute or aggressive osseous lesion. The  peritoneal dialysis catheter in the subcutaneous soft tissues in the  left abdomen demonstrated with thickening in the pelvis.                                                                       IMPRESSION:   1. Numerous bilateral simple renal cysts and subcentimeter cysts many  of which are difficult to characterize on CT imaging.  Hemorrhagic/proteinaceous cysts on MRI not well visualized on CT.  Consider MRI with contrast or continued 6 month follow-up CT renal  mass protocol for further surveillance.     2. Similar appearance of the 2.1 cm nonenhancing cystic lesion in the  pancreatic head/uncinate process which may represent IPMN. Additional  pancreatic cysts present on MRI, not well visualized/evaluated on CT.  Recommend six-month follow-up with MRI pancreas protocol for further  evaluation.     3. Left adrenal nodule stable, likely adrenal adenoma. Attention on  follow-up imaging.     4. Trace ascites with peritoneal dialysis catheter in place.     5. No acute findings in the abdomen or pelvis.        MRI Abd/Pelvis: EXAM: MR RENAL W/O CONTRAST  LOCATION: [facility]  DATE: 12/2/2024     INDICATION: Multiple anechoic (likely a combination of simple and mildly complex) bilateral renal cortical cysts noted on Renal US from 11 6 2024. Pt currently being evaluated for Kidney Pancreas Transplant.  COMPARISON: Correlate with CT abdomen and pelvis performed on 10/24/2024  TECHNIQUE: Multiplanar MRI renal protocol including T1 in/out phase, diffusion, multiplane T2.     FINDINGS:     RIGHT KIDNEY: No hydronephrosis is present. Multiple T2 hyperintense cystic lesions are seen measuring up to 1.8 cm along the superior pole. A few T1 hyperintense lesions are present measuring up to 6 mm at the mid pole (series 5, image 102).     LEFT KIDNEY: No hydronephrosis is present. Multiple T2 hyperintense cystic lesions are seen in the kidneys with one of the largest measuring 1.4 cm along the mid pole. A septated cystic lesion is also present measuring up to 1.8 cm along the mid pole.   Calcifications seen on the prior CT exam are not well visualized on the  current MRI study. A few T1 hyperintense lesions are present measuring up to 6 mm at the mid pole (series 6, image 38).     ADDITIONAL FINDINGS: A few tiny T2 hyperintense lesions are seen in the liver measuring up to 3 mm in the right hepatic lobe. No intrahepatic or extra hepatic biliary ductal ectasia is seen. Gallbladder is surgically absent. Spleen is not enlarged. Loss   of signal is present within the liver and spleen on the T1 weighted in phase images relative to the out of phase images. Mild intra-abdominal ascites is present. Multiple cystic lesions are seen in the pancreas measuring up to 19 mm along the pancreatic   head (series 11, image 26). Thin internal septation is also noted within this cyst (series 12, image 24). Stomach is distended with ingested contents. Visualized small and large bowel loops are nondilated. Retroperitoneal lymph nodes appear unchanged   from the prior CT exam measuring up to 7 mm in short axis diameter. Multilevel degenerative changes are present in the spine.                                                                      IMPRESSION:  1.  Multiple T2 hyperintense cystic lesions are seen within both kidneys including a minimally complex 1.8 cm cyst along the mid pole of the left kidney with thin internal septation. Characterization of these lesions is limited due to lack of intravenous   contrast. Several T1 hyperintense lesions are also present that may reflect proteinaceous/hemorrhagic cysts. However, characterization is limited due to lack of intravenous contrast and solid lesions such as neoplasm are also in the differential   diagnosis and cannot be excluded. Recommend follow-up MRI examination with intravenous contrast for further characterization. If MRI contrast cannot be given, CT renal exam with and without contrast could also be considered. If MRI or CT contrast cannot   be administered, short-term follow-up exam is recommended to evaluate for stability in  lesion size.  2.  Multiple T2 hyperintense pancreatic cystic lesions measuring up to 19 mm along the pancreatic head. Characterization is limited due to lack of intravenous contrast. Recommend follow-up examination in 6 months.  3.  Mild intra-abdominal ascites.  4.  Loss of signal seen on the T1 weighted in phase images relative to the out of phase images involving the liver and spleen. Findings are suggestive of iron deposition in the liver and spleen.                  Assessment and Plan:     ESRD (end stage renal disease) (H)  Undergoing peritoneal dialysis  - Adult Urology  Referral    Organ transplant candidate  Currently being seen by us for clearance for transplantation  - Adult Urology  Referral    Type 2 diabetes mellitus with diabetic nephropathy, with long-term current use of insulin (H)  Long-term diabetic  - Adult Urology  Referral        Renal cyst  Bilateral multiple small renal cyst maximum size is 1.8 cm on either side  Based on the evaluation of the MRI and the CT scan I do not see a significant solid component on any of those cysts specially on the 1 that was septations on the left side  At this time based on the evaluations I do not think we are concerned about any solid renal neoplasm.  I would recommend that she could go ahead with kidney transplantation from urology perspective  Following transplantation she can see us in 1 year with renal MRI with and without contrast  - [facility] Referral      Plan:  Okay for transplantation  Follow-up in 1 year with MRI renal protocol with and without contrast    Orders  No orders of the defined types were placed in this encounter.      Gurjit Drew MD  [facility] [place]      ==========================    Additional Billing and Coding Information:  Review of external notes as documented above   Review of the result(s) of each unique test - MRI, CT, creatinine                15 minutes  spent by me on the date of the encounter doing chart review, review of test results, interpretation of tests, patient visit, and documentation     ==========================

## 2025-01-09 NOTE — PATIENT INSTRUCTIONS
No evidence of significant lesions on the CT with contrast  No contraindication from urology side for transplant  Follow-up imaging after transplantation

## 2025-01-15 ENCOUNTER — TELEPHONE (OUTPATIENT)
Dept: ENDOCRINOLOGY | Facility: CLINIC | Age: 65
End: 2025-01-15
Payer: COMMERCIAL

## 2025-01-15 NOTE — TELEPHONE ENCOUNTER
Patient confirmed scheduled appointment:     /Date: 1/17/2025  Time: 11:30   Visit type: New Med WT MGMT Nutrition  Visit mode: Telephone  Provider:  Gladis Deleon RD  [place] [facility] [place]:         Date: 3/4/2025  Time: 1:30 PM  Visit type: new MTM  Visit mode: Virtual Visit  Provider:  Janett Stark RPH  [place] [facility] [place] Notes:         Date: 5/1/2025  Time: 11:30   Visit type: Return Weight Management  Visit mode: Virtual Visit or Telephone  Provider:  Chelsy Malik PA-C  [place] [facility] [place]    Additional Notes: Chelsy Malik Ok'd using NBS

## 2025-01-16 ENCOUNTER — TELEPHONE (OUTPATIENT)
Dept: EDUCATION SERVICES | Facility: CLINIC | Age: 65
End: 2025-01-16
Payer: COMMERCIAL

## 2025-01-16 DIAGNOSIS — E11.22 TYPE 2 DIABETES MELLITUS WITH CHRONIC KIDNEY DISEASE, WITH LONG-TERM CURRENT USE OF INSULIN, UNSPECIFIED CKD STAGE (H): Primary | ICD-10-CM

## 2025-01-16 DIAGNOSIS — Z79.4 TYPE 2 DIABETES MELLITUS WITH CHRONIC KIDNEY DISEASE, WITH LONG-TERM CURRENT USE OF INSULIN, UNSPECIFIED CKD STAGE (H): Primary | ICD-10-CM

## 2025-01-16 NOTE — TELEPHONE ENCOUNTER
Sent Mychart (1st Attempt) for the patient to call back and schedule the following:    Appointment type: Diabetes Edu  Provider: any  Return date: next available  Specialty phone number: [phone]  Additional appointment(s) needed: N/A  Additonal Notes: MARANDA full, [email or web address]    From: Nick Ramirez   Sent: 1/15/2025   2:28 PM CST   To: [id number]   I can't schedule for Diabetes educators... hoping you guys can.     ----- Message -----   From: Chelsy Malik PA-C   Sent: 1/13/2025   1:10 PM CST   To: Clinic Coordinators Weight Mgmt[facility]   Weight mgmt RD video first available   MTM 6 weeks phone   Chelsy 3-4 months return video ok to use new yamil   Diabetes educator follow up     Appts available in solutions.    Alyssa Ospina on 1/16/2025 at 11:39 AM

## 2025-01-19 ENCOUNTER — HEALTH MAINTENANCE LETTER (OUTPATIENT)
Age: 65
End: 2025-01-19

## 2025-01-28 ENCOUNTER — TELEPHONE (OUTPATIENT)
Dept: GASTROENTEROLOGY | Facility: CLINIC | Age: 65
End: 2025-01-28
Payer: COMMERCIAL

## 2025-01-28 NOTE — TELEPHONE ENCOUNTER
Left Voicemail (1st Attempt) for the patient to call back and schedule the following:    Appointment type: return    Provider: Michelle Livingston   Return date: 7/7/2025  Specialty phone number: [phone]   Additional appointment(s) needed:   Additional Notes:

## 2025-02-05 ENCOUNTER — TELEPHONE (OUTPATIENT)
Dept: TRANSPLANT | Facility: CLINIC | Age: 65
End: 2025-02-05
Payer: COMMERCIAL

## 2025-02-05 NOTE — TELEPHONE ENCOUNTER
Nisreen called to let me know she met with weight management but is unsure if she wants to continue working with them. Pt said she understands what needs to be done to lose weight and she did not have a good experience at her weight management appointment. Provided her with the scheduling phone numbers if she would like to cancel her upcoming appointments with weight management. RNCC discussed pt could discuss weight management with her PCP as well for options if she prefers to go elsewhere. Pt verbalized understanding.     Pt still needs to schedule her health maintenance items and will call RNCC once scheduled. Pt has my direct number for updates/questions.

## 2025-02-11 NOTE — PROGRESS NOTES
Transplant Surgery Consult Note    Medical record number: [id number]  YOB: 1960,   Consult requested by Dr. Carrion for evaluation of kidney and pancreas transplant candidacy.    Assessment and Recommendations: Ms. Mckenna is a good candidate for transplantation and has a good understanding of the risks and benefits of this approach to management of renal failure and diabetes. The following issues should be addressed prior to transplant:     Ms. Mckenna has End stage renal failure due to diabetes mellitus type 2 whose condition is not expected to resolve, is expected to progress, and is expected to continue to develop related comorbid conditions.  Cardiology consult for cardiac risk stratification to be ordered: Yes- longstanding DM  CT abdomen and pelvis without contrast to be ordered for assessment of vascular targets: Yes  Transplant listing labs ordered to include HLA, ABOx2, Cr, etc.  Dietician consult ordered: Yes  Social work consult ordered: Yes  Imaging reports reviewed:  n/a  Radiology images reviewed:n/a  Recipient suitable to move forward with work up of living donors:  Yes    Is a good candidate for kidney, but no prohibitive for SPK in spite of age. Needs to improve BMI (target 30 for SPK, acceptable as is for kidney). Needs to improve functional status and complete remainder of screening workup. Should also look for live donors and to see if qualifies for A2 transplant. On completion of workup should reassess to see what progress she has made to weight loss and how she is from a funcitonal status standpoint. If appears robust could consider SPK. If still marginal pancreas candidate recommend moving forward with kidney alone.       The majority of our visit was spent in counselling, discussing the medical and surgical risks of living or  donor kidney and pancreas transplantation, either in a simultaneous or sequential fashion. I contrasted approximate wait time for SPK vs living vs   donor kidneys from normal (0-85%) or higher (%) kidney donor profile index (KDPI) donors and their associated outcomes. I would recommend this individual to consider kidneys from high KDPI donors. The reason for this decision is best summarized as: decreased dialysis related morbidity/mortality, accepting lower kidney graft survival rates- would not consider this if felt to be SPK candidate, if she has live donors, or if she is A2 candidate.  Access to transplant will be impacted by living donor availability and overall candidacy for SPK, as well as the influence of blood type and degree of sensitization. We discussed advantages of preemptive transplant as well as living donor kidney transplant, and graft and patient survival outcomes associated with these options. Potential surgical complications of kidney and pancreas transplantation include bleeding, clotting, infection, wound complications, anastomotic failure and other issues such as cardiac complications, pneumonia, deep venous thrombosis, pulmonary embolism, post transplant diabetes and death. We discussed the need for protocol biopsy of the kidney and the possible need for a ureteral stent (and subsequent removal). We discussed benefits and risks associated with different approaches to exocrine drainage of pancreatic secretions. We also discussed differences in the average length of stay, recovery process, and posttransplant lab and monitoring protocol. We discussed the risk of graft rejection and recurrent diabetic nephropathy in the setting of poor glycemic control. I emphasized the need for strict immunosuppression adherence and the potential for complications of immunosuppression such as skin cancer or lymphoma, as well as a very low but not zero risk of donor-derived disease transmission risks (infection, cancer). MsNanette Bala asked good questions and the patient's candidacy will be reviewed at our Multidisciplinary Selection Committee.  Thank you for the opportunity to participate in Ms. Mckenna's care.    Total time: 45 minutes  Counselling time: 40 minutes      Gonsalo Davis MD  ---------------------------------------------------------------------------------------------------    HPI: Ms. Mckenna has End stage renal failure due to diabetes mellitus type 2. The patient has had diabetes for 30 years. Management is by Lantus 44 units. The patient usually checks her blood sugar 3 times/day.  Daily blood glucoses range typically from 70 to 150.  Hypoglyemic unawareness is an issueshe has drops below 70 but is not always able to feel them.  The diabetes is uncontrolled.    Complications of diabetes include:    Retinopathy:  Yes   Neuropathy: No  Gastroparesis:  No    The patient is on dialysis.    Has potential kidney donors:  Doesn't know .  Interested in participation in paired exchange if a donor is willing: Doesn't know     The patient has the following pertinent history:       No    Yes  Dialysis:    []      [] via:       Blood Transfusion                  []      []  Number of units:   Most recently:  Pregnancy:    []      [] Number:       Previous Transplant:  []      [] Details:    Cancer    []      [] Comment:   Kidney stones   []      [] Comment:      Recurrent infections  []      []  Type:                  Bladder dysfunction  []      [] Cause:    Claudication   []      [] Distance:    Previous Amputation  []      [] Cause:     Chronic anticoagulation  []      [] Indication:  Restorationism  []      []     Past Medical History:   Diagnosis Date    ESRD (end stage renal disease) on dialysis (H)     HTN (hypertension)     Hypothyroidism     Type 2 diabetes mellitus (H)      Past Surgical History:   Procedure Laterality Date    IR CVC TUNNEL PLACEMENT > 5 YRS OF AGE  6/21/2024    IR CVC TUNNEL REMOVAL RIGHT  9/3/2024    IR CVC TUNNEL REVISION RIGHT  7/26/2024     No family history on file.  Social History     Socioeconomic History     Marital status:      Spouse name: Not on file    Number of children: Not on file    Years of education: Not on file    Highest education level: Not on file   Occupational History    Not on file   Tobacco Use    Smoking status: Never    Smokeless tobacco: Never   Substance and Sexual Activity    Alcohol use: Never    Drug use: Never    Sexual activity: Not on file   Other Topics Concern    Not on file   Social History Narrative    Not on file     Social Drivers of Health     Financial Resource Strain: Not on file   Food Insecurity: Not on file   Transportation Needs: Not on file   Physical Activity: Not on file   Stress: Not on file   Social Connections: Not on file   Interpersonal Safety: Low Risk  (9/3/2024)    Interpersonal Safety     Do you feel physically and emotionally safe where you currently live?: Yes     Within the past 12 months, have you been hit, slapped, kicked or otherwise physically hurt by someone?: No     Within the past 12 months, have you been humiliated or emotionally abused in other ways by your partner or ex-partner?: No   Housing Stability: Not on file       ROS:   CONSTITUTIONAL:  No fevers or chills  EYES: negative for icterus  ENT:  negative for hearing loss, tinnitus and sore throat  RESPIRATORY:  negative for cough, sputum, dyspnea  CARDIOVASCULAR:  negative for chest pain  negative for lower extremity wounds/ulcers  GASTROINTESTINAL:  negative for nausea, vomiting, diarrhea or constipation  GENITOURINARY:  negative for incontinence, dysuria, bladder emptying problems  HEME:  No easy bruising  INTEGUMENT:  negative for rash and pruritus  NEURO:  Negative for headache, seizure disorder    Allergies:   No Known Allergies    Medications:  Prescription Medications as of 2/11/2025         Rx Number Disp Refills Start End Last Dispensed Date Next Fill Date Owning Pharmacy    amLODIPine (NORVASC) 10 MG tablet  -- -- 5/15/2024 --       Sig: Take 10 mg by mouth daily    Class: Historical     Route: Oral    atorvastatin (LIPITOR) 40 MG tablet  -- -- 5/16/2024 5/16/2025       Sig: Take 40 mg by mouth daily    Class: Historical    Route: Oral    bumetanide (BUMEX) 2 MG tablet  -- --  --       Sig: Take 2 mg by mouth 2 times daily    Class: Historical    Route: Oral    calcitRIOL (ROCALTROL) 0.25 MCG capsule  30 capsule 0 5/23/2024 --   [place], [place] - [address] [facility]    Sig: Take 1 capsule (0.25 mcg) by mouth daily    Class: E-Prescribe    Route: Oral    calcium acetate (PHOSLO) 667 MG CAPS capsule  -- --  --       Sig: Take 667 mg by mouth 3 times daily (with meals).    Class: Historical    Route: Oral    hydrALAZINE (APRESOLINE) 25 MG tablet  -- --  --       Sig: Take 25 mg by mouth 3 times daily    Class: Historical    Route: Oral    insulin glargine (LANTUS PEN) 100 UNIT/ML pen  -- --  --       Sig: Inject 10 Units subcutaneously at bedtime. 44 units at night    Class: Historical    Route: Subcutaneous    levothyroxine (SYNTHROID/LEVOTHROID) 175 MCG tablet  -- -- 5/15/2024 --       Sig: Take 175 mcg by mouth daily    Class: Historical    Route: Oral    senna-docusate (SENOKOT-S/PERICOLACE) 8.6-50 MG tablet  -- -- 8/1/2024 --       Sig: Take 1 tablet by mouth 2 times daily.    Class: Historical    Route: Oral    vitamin D3 (CHOLECALCIFEROL) 50 mcg (2000 units) tablet  -- --  --       Sig: Take 1 tablet by mouth daily    Class: Historical    Route: Oral            Exam:      Appearance: in no apparent distress.   Skin: normal, warm, dry  Head and Neck: Normal, no rashes or jaundice  Respiratory: easy respirations, no audible wheezing.  Cardiovascular: RRR  Abdomen: soft, rounded, nontender. Obese, modest pannus, but flattens out when recumbent   Extremities: femoral 2+/2+, Edema, none  Neuro: without deficit, cranial nerves intact     Diagnostics:   No results found for this or any previous visit (from the past 4 weeks).  OS CPRA   Date Value Ref Range Status    10/03/2024 9  Final

## 2025-02-26 ENCOUNTER — DOCUMENTATION ONLY (OUTPATIENT)
Dept: ENDOCRINOLOGY | Facility: CLINIC | Age: 65
End: 2025-02-26
Payer: COMMERCIAL

## 2025-02-26 DIAGNOSIS — E66.811 OBESITY, CLASS I, BMI 30-34.9: Primary | ICD-10-CM

## 2025-02-26 NOTE — PROGRESS NOTES
Clinical Pharmacy Note    MTM referral placed due to [facility] Medication Therapy Management (MTM) practice shift. CPA with Chelsy Malik PA-C .    Janett Stark, Pharm D., MPH    Medication Therapy Management Pharmacist   [facility]

## 2025-03-05 ENCOUNTER — TELEPHONE (OUTPATIENT)
Dept: ENDOCRINOLOGY | Facility: CLINIC | Age: 65
End: 2025-03-05
Payer: COMMERCIAL

## 2025-03-05 NOTE — TELEPHONE ENCOUNTER
MTM appointment canceled, we made one more attempt to reschedule. Patient is not interested in scheduling at this time     Routing back to referring provider and MTM Pharmacist Team        Patricia Norris  MT

## 2025-04-27 ENCOUNTER — HEALTH MAINTENANCE LETTER (OUTPATIENT)
Age: 65
End: 2025-04-27

## 2025-05-28 NOTE — PROGRESS NOTES
Care Suites Post Procedure Note    Patient Information  Name: Nisreen Mckenna  Age: 64 year old    Post Procedure  Time patient returned to Care Suites: 1515  Concerns/abnormal assessment: NA  If abnormal assessment, provider notified: N/A  Plan/Other: A&Ox4 Tunnel Cath site WNL. Family at bedside    Rozina Aranda RN     Detail Level: Zone Detail Level: Generalized Skin Checks Recommendations: Once per month for new lesions or changing nevi Sunscreen Recommendations: CeraVe AM, Supergoop, Neutrogena Hydro Boost, Elta MD Detail Level: Detailed Sunscreen Recommendations: CeraVe AM, Supergoop, La Roche Posay or Elta MD

## 2025-06-02 NOTE — PROGRESS NOTES
[facility]   Cardiology Service  History and Physical      Nisreen Mckenna MRN# [id number]   YOB: 1960 Age: 65 year old       HPI:   Nisreen Mckenna is a 65 year old year old female with a medical history significant for ESRD 2/2 DM2 and HTN (on HD), HFpEF, s/p RFA for cardiac arrhythmia, PH, obesity, TARA, HLD. She presents today for cardiovascular risk assessment as part of pre-kidney transplant evaluation.     She is a never smoker/non-smoker. She does not use drugs, does not use alcohol. Her activity level is sedentary. Has an exercise bike at home she got a couple weeks ago but she is too short to use it. Uses stairs in her home on occasion. She denies symptoms of chest pain, dizziness, lightheadedness, palpitations, orthopnea, PND, or edema. Endorses shortness of breath with minimal exertion. This has been ongoing since starting dialysis. She does not have family history of pre-mature heart disease.       Cardiovascular Risk Factor Profile:  hypertension, obesity, diabetes, female age >55 , and sedentary lifestyle     Current Cardiovascular Symptoms:  dyspnea    ACC HF Stage:  Stage B    NYHA Class:  Class II    Functional Capacity:  CANNOT perform 4 METS without symptoms        Past Medical History:   Diagnosis Date    ESRD (end stage renal disease) on dialysis (H)     HTN (hypertension)     Hypothyroidism     Type 2 diabetes mellitus (H)        Past Surgical History:   Procedure Laterality Date    IR CVC TUNNEL PLACEMENT > 5 YRS OF AGE  6/21/2024    IR CVC TUNNEL REMOVAL RIGHT  9/3/2024    IR CVC TUNNEL REVISION RIGHT  7/26/2024       Current Outpatient Medications   Medication Sig Dispense Refill    amLODIPine (NORVASC) 10 MG tablet Take 10 mg by mouth daily      atorvastatin (LIPITOR) 40 MG tablet Take 40 mg by mouth daily      bumetanide (BUMEX) 2 MG tablet Take 2 mg by mouth 2 times daily      calcitRIOL (ROCALTROL) 0.25 MCG capsule Take 1 capsule  (0.25 mcg) by mouth daily 30 capsule 0    calcium acetate (PHOSLO) 667 MG CAPS capsule Take 667 mg by mouth 3 times daily (with meals).      hydrALAZINE (APRESOLINE) 25 MG tablet Take 25 mg by mouth 3 times daily      insulin glargine (LANTUS PEN) 100 UNIT/ML pen Inject 10 Units subcutaneously at bedtime. 44 units at night      levothyroxine (SYNTHROID/LEVOTHROID) 175 MCG tablet Take 175 mcg by mouth daily      senna-docusate (SENOKOT-S/PERICOLACE) 8.6-50 MG tablet Take 1 tablet by mouth 2 times daily.      vitamin D3 (CHOLECALCIFEROL) 50 mcg (2000 units) tablet Take 1 tablet by mouth daily       No current facility-administered medications for this visit.       No family history on file.    Social History     Tobacco Use    Smoking status: Never    Smokeless tobacco: Never   Substance Use Topics    Alcohol use: Never       No Known Allergies      Review Of Systems:   CONSTITUTIONAL: No report of fevers or chills  RESPIRATORY: No cough, wheezing, SOB, or hemoptysis  CARDIOVASCULAR: see HPI  MUSCULO-SKELETAL: No joint pain or swelling, no muscle pain  NEURO: No paresthesias, syncope, pre-syncope, lightheadness, dizziness or vertigo  ENDOCRINE: No temperature intolerance, no skin/hair changes  PSYCHIATRIC: No change in mood, feeling down/anxious, no change in sleep or appetite  GI: No melena or hematochezia, no change in bowel habits  : No hematuria or dysuria, no hesitancy, dribbling or incontinence. Still making urine  HEME: No easy bruising or bleeding, no history of anemia, no history of blood clots  SKIN: No rashes or sores, no unusual itching      Physical Examination:  Vitals: There were no vitals taken for this visit.  BMI= There is no height or weight on file to calculate BMI.    GENERAL APPEARANCE: healthy, alert and no distress  HEENT: no icterus, no xanthelasmas, normal pupil size and reaction, normal palate, mucosa moist  NECK: JVP difficult to assess due to body habitus , brisk carotid upstroke  bilaterally  CHEST: lungs clear to auscultation without rales, rhonchi or wheezes, no use of accessory muscles, no retractions  CARDIOVASCULAR: regular rhythm, normal S1 and S2, no S3 or S4 and no murmur, click or rub.  ABDOMEN: soft, non tender, without hepatosplenomegaly, no masses palpable, bowel sounds normal  EXTREMITIES: warm, trace LE edema, DP/PT pulses 2+ bilaterally, no clubbing or cyanosis   NEURO: alert and oriented to person/place/time, normal speech, gait and affect  SKIN: no ecchymoses, no rashes      Laboratory:  Last Comprehensive Metabolic Panel:  Lab Results   Component Value Date     10/03/2024    POTASSIUM 4.9 10/03/2024    CHLORIDE 97 (L) 10/03/2024    CO2 23 10/03/2024    ANIONGAP 16 (H) 10/03/2024     (H) 10/03/2024    BUN 81.7 (H) 10/03/2024    CR 4.87 (H) 10/03/2024    GFRESTIMATED 9 (L) 10/03/2024    BETH 9.8 10/03/2024       Last CBC:  CBC RESULTS:   Recent Labs   Lab Test 10/03/24  1319   WBC 10.0   RBC 3.78*   HGB 11.3*   HCT 33.3*   MCV 88   MCH 29.9   MCHC 33.9   RDW 15.0          10/24/24 EKG:       10/3/24 Echocardiogram:  Global and regional left ventricular function is hyperkinetic with an EF of  65-70%. Moderate concentric wall thickening consistent with left ventricular  hypertrophy is present. Left ventricular diastolic function is abnormal.  Dynamic LVOTobstruction noted with peak gradient of 47mmHg with Valsalva.  Right ventricular function, chamber size, wall motion, and thickness are  normal.  Mild left atrial enlargement is present.  Moderate to severe mitral annular calcification is present.  Moderate aortic valve calcification is present.  The limitated motion of the aortic cusps suggests at least mild-to-moderate  aortic stenosis. The Doppler interrogation is incomplete.  No pericardial effusion is present.     Compared to prior TTE 5/20/24, peak gradient of LV mid-ventricular obstruction  has decreased.    Assessment and Plan:     # At Risk for CAD    # Hyperlipidemia   No known history of CAD. Most recent lipid panel 10/28/24 with total 184, HDL 57, . She is on statin therapy. Uses stairs in her home on occasion. She denies symptoms of chest pain, dizziness, lightheadedness, palpitations, orthopnea, PND, or edema. Endorses shortness of breath with minimal exertion. This has been ongoing since starting dialysis. Risk factors for CAD include hypertension, obesity, diabetes, female age >55 , and sedentary lifestyle.  - Start Aspirin 81mg daily   - Continue Lipitor 40mg q HS   - Ischemic evaluation with coronary CT angiogram  - If CTA within normal limits OK to follow up in 24 months; sooner with symptoms     # Hypertension  Blood pressure in office today 120/66. Blood pressures on home average 120's 60-70's.   - Continue Amlodipine 10mg daily   - Continue Hydralazine 25mg TID   - Monitor BP's at home. Keep a log of readings and bring to follow up appointments  - BP goal < 130/80    # Diabetes Mellitus Type 2  Most recent A1c per chart review 9.7% on 5/20/24. Home regimen includes Lantus 58 at night, 12 in the morning. Blood sugars at home average low 100-130's. She denies hyper/hypoglycemic unawareness.   - Educated on importance of diabetic control in preventing progression of heart and kidney disease  - Continue current regimen     # Obesity (BMI 33)   Reports weight has been stable over the last year. Recently started back on a diet and does not eat bread anymore. Has not lost any weight yet.   - Educated patient on the importance of healthy diet and exercise in reducing risk for heart disease   - Encouraged 150 minutes/week of moderate intensity exercise   - Encouraged healthy weight loss; discussed Mediterranean diet     # ESRD on HD   She is currently on dialysis. She admits to being compliant with all medications. Denies uremic symptoms; pruritus, nausea, vomiting.  - Additional labs / follow-up / testing per SOT team  - Continue Bumex 2mg BID        CAD Risk Level:  High Risk: > 3 risk factors, OR diabetic kidney disease, OR DM > 10 years, known CAD or PAD, prior PCI or CABG, on dialysis 5 years or more         RCRI Score:   - High risk surgery (>5% cardiac complication risk) = 1 point   - Diabetes Mellitus (on Insulin) = 1 point   - Serum Creatinine >2.0 mg/dl = 1 point        I have reviewed and updated the patient's Past Medical History, Social History, Family History and Medication List.       LURDES Mcgowan, CNP  [facility] Cardiology      Review of prior external note(s) from - care everywhere, SOT, nephrology  Review of the result(s) of each unique test - echocardiogram, EKG, CT  Ordering of each unique test  Prescription drug management- medication reconciliation      25 minutes spent by me on the date of the encounter doing chart review, review of outside records, review of test results, patient visit, and documentation       LURDES Alba CNP on 7/2/2025 at 11:45 AM

## 2025-07-02 ENCOUNTER — OFFICE VISIT (OUTPATIENT)
Dept: TRANSPLANT | Facility: CLINIC | Age: 65
End: 2025-07-02
Attending: NURSE PRACTITIONER
Payer: COMMERCIAL

## 2025-07-02 DIAGNOSIS — R94.39 ABNORMAL RESULT OF OTHER CARDIOVASCULAR FUNCTION STUDY: ICD-10-CM

## 2025-07-02 DIAGNOSIS — Z76.82 ORGAN TRANSPLANT CANDIDATE: ICD-10-CM

## 2025-07-02 DIAGNOSIS — R06.09 DYSPNEA ON EXERTION: ICD-10-CM

## 2025-07-02 DIAGNOSIS — I12.0 HYPERTENSIVE CHRONIC KIDNEY DISEASE WITH STAGE 5 CHRONIC KIDNEY DISEASE OR END STAGE RENAL DISEASE (H): ICD-10-CM

## 2025-07-02 DIAGNOSIS — E11.21 TYPE 2 DIABETES MELLITUS WITH DIABETIC NEPHROPATHY, WITH LONG-TERM CURRENT USE OF INSULIN (H): ICD-10-CM

## 2025-07-02 DIAGNOSIS — Z79.4 TYPE 2 DIABETES MELLITUS WITH DIABETIC NEPHROPATHY, WITH LONG-TERM CURRENT USE OF INSULIN (H): ICD-10-CM

## 2025-07-02 DIAGNOSIS — I51.7 LEFT VENTRICULAR HYPERTROPHY: Primary | ICD-10-CM

## 2025-07-02 DIAGNOSIS — N18.6 ESRD (END STAGE RENAL DISEASE) (H): ICD-10-CM

## 2025-07-02 PROCEDURE — G0463 HOSPITAL OUTPT CLINIC VISIT: HCPCS

## 2025-07-02 RX ORDER — ASPIRIN 81 MG/1
81 TABLET ORAL DAILY
COMMUNITY
Start: 2025-07-02

## 2025-07-02 NOTE — PATIENT INSTRUCTIONS
You were seen in the cardiology clinic by: Janiya Mccloud CNP    Plan:     Medication Changes:   - Start baby aspirin 81mg daily.     Labs/Tests Needed:   - Please call the number below to schedule your coronary CT angiogram. This will look for blockages in your heart.     Follow Up Visit:   - If coronary CT within acceptable limits, follow up with cardiology in 24 months. Sooner with symptoms.     General Recommendations/Guidelines:  - Monitor your blood pressures at home. Please keep a log of the readings and bring to your future nephrology or cardiology appointments. Your blood pressure goal is < 130/80. It is important to achieve adequate blood pressure control before transplant.   - Focus on a low sodium diet. You should aim to consume <2,000mg of sodium daily.   - I encourage you to achieve 150 minutes/week of moderate intensity exercise if able. If this is not achievable right away, you can gradually work yourself up to this starting with low intensity exercises (i.e. walking or swimming) a couple days a week. No swimming if on peritoneal dialysis.**      Important Numbers:     Cardiology   Telephone Number     To schedule an appointment or leave a message for your care team:   [phone]      Press #1   To reach the billing department: [phone]      Press # 3     To obtain copies of your medical records: [phone]      Press # 4   To reach the on-call cardiologist for after hours or on weekends: [phone]     Option #4, and ask to speak to the      Cardiovascular Clinic:   [address] [place] [address]. [place] [address]  [phone]      Thank you for trusting us with your health care needs!

## 2025-07-02 NOTE — Clinical Note
7/2/2025      Nisreen Mckenna  [address]  [place] [address]      Dear Colleague,    Thank you for referring your patient, Nisreen Mckenna, to the [facility]. Please see a copy of my visit note below.          [facility]   Cardiology Service  History and Physical      Nisreen Mckenna MRN# [id number]   YOB: 1960 Age: 65 year old       HPI:   Nisreen Mckenna is a 65 year old year old female with a medical history significant for ESRD 2/2 DM2 and HTN (on HD), HFpEF, s/p RFA for cardiac arrhythmia, PH, obesity, TRAA, HLD. She presents today for cardiovascular risk assessment as part of pre-kidney transplant evaluation.     She is a never smoker/non-smoker. She does not*** use drugs, *** use alcohol. Her activity level is ***. She denies symptoms of chest pain, dizziness, lightheadedness, palpitations, shortness of breath, orthopnea, PND, or edema. *** She does/does not*** have family history of pre-mature heart disease.       Cardiovascular Risk Factor Profile:  hypertension, obesity, diabetes, female age >55 , and sedentary lifestyle     Current Cardiovascular Symptoms:  {DO CARDIAC SYMPTOMS:089118}    ACC HF Stage:  Stage B    NYHA Class:  Class I/II***    Functional Capacity:  {Functional Capacity Assessment:437013}        Past Medical History:   Diagnosis Date    ESRD (end stage renal disease) on dialysis (H)     HTN (hypertension)     Hypothyroidism     Type 2 diabetes mellitus (H)        Past Surgical History:   Procedure Laterality Date    IR CVC TUNNEL PLACEMENT > 5 YRS OF AGE  6/21/2024    IR CVC TUNNEL REMOVAL RIGHT  9/3/2024    IR CVC TUNNEL REVISION RIGHT  7/26/2024       Current Outpatient Medications   Medication Sig Dispense Refill    amLODIPine (NORVASC) 10 MG tablet Take 10 mg by mouth daily      atorvastatin (LIPITOR) 40 MG tablet Take 40 mg by mouth daily      bumetanide (BUMEX) 2 MG tablet Take 2 mg by mouth 2  times daily      calcitRIOL (ROCALTROL) 0.25 MCG capsule Take 1 capsule (0.25 mcg) by mouth daily 30 capsule 0    calcium acetate (PHOSLO) 667 MG CAPS capsule Take 667 mg by mouth 3 times daily (with meals).      hydrALAZINE (APRESOLINE) 25 MG tablet Take 25 mg by mouth 3 times daily      insulin glargine (LANTUS PEN) 100 UNIT/ML pen Inject 10 Units subcutaneously at bedtime. 44 units at night      levothyroxine (SYNTHROID/LEVOTHROID) 175 MCG tablet Take 175 mcg by mouth daily      senna-docusate (SENOKOT-S/PERICOLACE) 8.6-50 MG tablet Take 1 tablet by mouth 2 times daily.      vitamin D3 (CHOLECALCIFEROL) 50 mcg (2000 units) tablet Take 1 tablet by mouth daily       No current facility-administered medications for this visit.       No family history on file.    Social History     Tobacco Use    Smoking status: Never    Smokeless tobacco: Never   Substance Use Topics    Alcohol use: Never       No Known Allergies      Review Of Systems:   CONSTITUTIONAL: No report of fevers or chills  RESPIRATORY: No cough, wheezing, SOB, or hemoptysis  CARDIOVASCULAR: see HPI  MUSCULO-SKELETAL: No joint pain or swelling, no muscle pain  NEURO: No paresthesias, syncope, pre-syncope, lightheadness, dizziness or vertigo  ENDOCRINE: No temperature intolerance, no skin/hair changes  PSYCHIATRIC: No change in mood, feeling down/anxious, no change in sleep or appetite  GI: No melena or hematochezia, no change in bowel habits  : No hematuria or dysuria, no hesitancy, dribbling or incontinence. Still making urine: ***  HEME: No easy bruising or bleeding, no history of anemia, no history of blood clots  SKIN: No rashes or sores, no unusual itching      Physical Examination:  Vitals: There were no vitals taken for this visit.  BMI= There is no height or weight on file to calculate BMI.    GENERAL APPEARANCE: healthy, alert and no distress  HEENT: no icterus, no xanthelasmas, normal pupil size and reaction, normal palate, mucosa moist  NECK:  JVP *** , brisk carotid upstroke bilaterally  CHEST: lungs clear to auscultation without rales, rhonchi or wheezes, no use of accessory muscles, no retractions  CARDIOVASCULAR: regular rhythm, normal S1 and S2, no S3 or S4 and no murmur, click or rub.  ABDOMEN: soft, non tender, without hepatosplenomegaly, no masses palpable, bowel sounds normal  EXTREMITIES: warm, *** edema, DP/PT pulses 2+ bilaterally, no clubbing or cyanosis   NEURO: alert and oriented to person/place/time, normal speech, gait and affect  SKIN: no ecchymoses, no rashes      Laboratory:  Last Comprehensive Metabolic Panel:  Lab Results   Component Value Date     10/03/2024    POTASSIUM 4.9 10/03/2024    CHLORIDE 97 (L) 10/03/2024    CO2 23 10/03/2024    ANIONGAP 16 (H) 10/03/2024     (H) 10/03/2024    BUN 81.7 (H) 10/03/2024    CR 4.87 (H) 10/03/2024    GFRESTIMATED 9 (L) 10/03/2024    BETH 9.8 10/03/2024       Last CBC:  CBC RESULTS:   Recent Labs   Lab Test 10/03/24  1319   WBC 10.0   RBC 3.78*   HGB 11.3*   HCT 33.3*   MCV 88   MCH 29.9   MCHC 33.9   RDW 15.0          10/24/24 EKG:       10/3/24 Echocardiogram:  Global and regional left ventricular function is hyperkinetic with an EF of  65-70%. Moderate concentric wall thickening consistent with left ventricular  hypertrophy is present. Left ventricular diastolic function is abnormal.  Dynamic LVOTobstruction noted with peak gradient of 47mmHg with Valsalva.  Right ventricular function, chamber size, wall motion, and thickness are  normal.  Mild left atrial enlargement is present.  Moderate to severe mitral annular calcification is present.  Moderate aortic valve calcification is present.  The limitated motion of the aortic cusps suggests at least mild-to-moderate  aortic stenosis. The Doppler interrogation is incomplete.  No pericardial effusion is present.     Compared to prior TTE 5/20/24, peak gradient of LV mid-ventricular obstruction  has decreased.    Assessment and  Plan:     # At Risk for CAD   # Hyperlipidemia   No known history of CAD. Most recent lipid panel 10/28/24 with total 184, HDL 57, . She is on statin therapy. She denies chest pain, dizziness, lightheadedness, shortness of breath, orthopnea, edema, or PND. *** Risk factors for CAD include hypertension, obesity, diabetes, female age >55 , and sedentary lifestyle.  - Start Aspirin 81mg daily ***  - Continue Lipitor 40mg q HS ***  - Ischemic evaluation with coronary CT angiogram  - If CTA within normal limits OK to follow up in 24 months; sooner with symptoms     # Hypertension  Blood pressure in office today ***. Blood pressures on home average ***.   - Continue Amlodipine 10mg daily ***  - Continue Hydralazine 25mg TID ***  - Monitor BP's at home. Keep a log of readings and bring to follow up appointments  - BP goal < 130/80    # Diabetes Mellitus Type 2  Most recent A1c per chart review 9.7% on 5/20/24. Home regimen includes Lantus *** units.  Blood sugars at home average ***. She denies*** hyper/hypoglycemic unawareness.   - Educated on importance of diabetic control in preventing progression of heart and kidney disease  - ***    # Obesity (BMI 33)   Reports weight has been *** over the last year.   - Educated patient on the importance of healthy diet and exercise in reducing risk for heart disease   - Encouraged 150 minutes/week of moderate intensity exercise   - Encouraged healthy weight loss; discussed Mediterranean diet     # ESRD on HD   She is currently on dialysis. She admits to being compliant with all medications. Denies uremic symptoms; pruritus, nausea, vomiting. ***  - Additional labs / follow-up / testing per SOT team  - Continue Bumex 2mg BID ***      CAD Risk Level:  High Risk: > 3 risk factors, OR diabetic kidney disease, OR DM > 10 years, known CAD or PAD, prior PCI or CABG, on dialysis 5 years or more         RCRI Score:   - High risk surgery (>5% cardiac complication risk) = 1 point   -  Diabetes Mellitus (on Insulin) = 1 point   - Serum Creatinine >2.0 mg/dl = 1 point        I have reviewed and updated the patient's Past Medical History, Social History, Family History and Medication List.       LURDES Mcgowan, CNP  [facility]      Review of prior external note(s) from - care everywhere, SOT, nephrology  Review of the result(s) of each unique test - echocardiogram, EKG, CT  Ordering of each unique test  Prescription drug management- medication reconciliation    *** minutes spent by me on the date of the encounter doing chart review, review of outside records, review of test results, patient visit, and documentation       SIGN ***          [facility]   Cardiology Service  History and Physical      Nisreen Mckenna MRN# [id number]   YOB: 1960 Age: 65 year old       HPI:   Nisreen Mckenna is a 65 year old year old female with a medical history significant for ESRD 2/2 DM2 and HTN (on HD), HFpEF, s/p RFA for cardiac arrhythmia, PH, obesity, TARA, HLD. She presents today for cardiovascular risk assessment as part of pre-kidney transplant evaluation.     She is a never smoker/non-smoker. She does not use drugs, does not use alcohol. Her activity level is sedentary. Has an exercise bike at home she got a couple weeks ago but she is too short to use it. Uses stairs in her home on occasion. She denies symptoms of chest pain, dizziness, lightheadedness, palpitations, orthopnea, PND, or edema. Endorses shortness of breath with minimal exertion. This has been ongoing since starting dialysis. She does not have family history of pre-mature heart disease.       Cardiovascular Risk Factor Profile:  hypertension, obesity, diabetes, female age >55 , and sedentary lifestyle     Current Cardiovascular Symptoms:  dyspnea    ACC HF Stage:  Stage B    NYHA Class:  Class II    Functional Capacity:  CANNOT perform 4 METS without symptoms        Past Medical  History:   Diagnosis Date     ESRD (end stage renal disease) on dialysis (H)      HTN (hypertension)      Hypothyroidism      Type 2 diabetes mellitus (H)        Past Surgical History:   Procedure Laterality Date     IR CVC TUNNEL PLACEMENT > 5 YRS OF AGE  6/21/2024     IR CVC TUNNEL REMOVAL RIGHT  9/3/2024     IR CVC TUNNEL REVISION RIGHT  7/26/2024       Current Outpatient Medications   Medication Sig Dispense Refill     amLODIPine (NORVASC) 10 MG tablet Take 10 mg by mouth daily       atorvastatin (LIPITOR) 40 MG tablet Take 40 mg by mouth daily       bumetanide (BUMEX) 2 MG tablet Take 2 mg by mouth 2 times daily       calcitRIOL (ROCALTROL) 0.25 MCG capsule Take 1 capsule (0.25 mcg) by mouth daily 30 capsule 0     calcium acetate (PHOSLO) 667 MG CAPS capsule Take 667 mg by mouth 3 times daily (with meals).       hydrALAZINE (APRESOLINE) 25 MG tablet Take 25 mg by mouth 3 times daily       insulin glargine (LANTUS PEN) 100 UNIT/ML pen Inject 10 Units subcutaneously at bedtime. 44 units at night       levothyroxine (SYNTHROID/LEVOTHROID) 175 MCG tablet Take 175 mcg by mouth daily       senna-docusate (SENOKOT-S/PERICOLACE) 8.6-50 MG tablet Take 1 tablet by mouth 2 times daily.       vitamin D3 (CHOLECALCIFEROL) 50 mcg (2000 units) tablet Take 1 tablet by mouth daily       No current facility-administered medications for this visit.       No family history on file.    Social History     Tobacco Use     Smoking status: Never     Smokeless tobacco: Never   Substance Use Topics     Alcohol use: Never       No Known Allergies      Review Of Systems:   CONSTITUTIONAL: No report of fevers or chills  RESPIRATORY: No cough, wheezing, SOB, or hemoptysis  CARDIOVASCULAR: see HPI  MUSCULO-SKELETAL: No joint pain or swelling, no muscle pain  NEURO: No paresthesias, syncope, pre-syncope, lightheadness, dizziness or vertigo  ENDOCRINE: No temperature intolerance, no skin/hair changes  PSYCHIATRIC: No change in mood, feeling  down/anxious, no change in sleep or appetite  GI: No melena or hematochezia, no change in bowel habits  : No hematuria or dysuria, no hesitancy, dribbling or incontinence. Still making urine: ***  HEME: No easy bruising or bleeding, no history of anemia, no history of blood clots  SKIN: No rashes or sores, no unusual itching      Physical Examination:  Vitals: There were no vitals taken for this visit.  BMI= There is no height or weight on file to calculate BMI.    GENERAL APPEARANCE: healthy, alert and no distress  HEENT: no icterus, no xanthelasmas, normal pupil size and reaction, normal palate, mucosa moist  NECK: JVP *** , brisk carotid upstroke bilaterally  CHEST: lungs clear to auscultation without rales, rhonchi or wheezes, no use of accessory muscles, no retractions  CARDIOVASCULAR: regular rhythm, normal S1 and S2, no S3 or S4 and no murmur, click or rub.  ABDOMEN: soft, non tender, without hepatosplenomegaly, no masses palpable, bowel sounds normal  EXTREMITIES: warm, *** edema, DP/PT pulses 2+ bilaterally, no clubbing or cyanosis   NEURO: alert and oriented to person/place/time, normal speech, gait and affect  SKIN: no ecchymoses, no rashes      Laboratory:  Last Comprehensive Metabolic Panel:  Lab Results   Component Value Date     10/03/2024    POTASSIUM 4.9 10/03/2024    CHLORIDE 97 (L) 10/03/2024    CO2 23 10/03/2024    ANIONGAP 16 (H) 10/03/2024     (H) 10/03/2024    BUN 81.7 (H) 10/03/2024    CR 4.87 (H) 10/03/2024    GFRESTIMATED 9 (L) 10/03/2024    BETH 9.8 10/03/2024       Last CBC:  CBC RESULTS:   Recent Labs   Lab Test 10/03/24  1319   WBC 10.0   RBC 3.78*   HGB 11.3*   HCT 33.3*   MCV 88   MCH 29.9   MCHC 33.9   RDW 15.0          10/24/24 EKG:       10/3/24 Echocardiogram:  Global and regional left ventricular function is hyperkinetic with an EF of  65-70%. Moderate concentric wall thickening consistent with left ventricular  hypertrophy is present. Left ventricular  diastolic function is abnormal.  Dynamic LVOTobstruction noted with peak gradient of 47mmHg with Valsalva.  Right ventricular function, chamber size, wall motion, and thickness are  normal.  Mild left atrial enlargement is present.  Moderate to severe mitral annular calcification is present.  Moderate aortic valve calcification is present.  The limitated motion of the aortic cusps suggests at least mild-to-moderate  aortic stenosis. The Doppler interrogation is incomplete.  No pericardial effusion is present.     Compared to prior TTE 5/20/24, peak gradient of LV mid-ventricular obstruction  has decreased.    Assessment and Plan:     # At Risk for CAD   # Hyperlipidemia   No known history of CAD. Most recent lipid panel 10/28/24 with total 184, HDL 57, . She is on statin therapy. Uses stairs in her home on occasion. She denies symptoms of chest pain, dizziness, lightheadedness, palpitations, orthopnea, PND, or edema. Endorses shortness of breath with minimal exertion. This has been ongoing since starting dialysis. Risk factors for CAD include hypertension, obesity, diabetes, female age >55 , and sedentary lifestyle.  - Start Aspirin 81mg daily ***  - Continue Lipitor 40mg q HS   - Ischemic evaluation with coronary CT angiogram  - If CTA within normal limits OK to follow up in 24 months; sooner with symptoms     # Hypertension  Blood pressure in office today 120/66. Blood pressures on home average 120's 60-70's.   - Continue Amlodipine 10mg daily   - Continue Hydralazine 25mg TID   - Monitor BP's at home. Keep a log of readings and bring to follow up appointments  - BP goal < 130/80    # Diabetes Mellitus Type 2  Most recent A1c per chart review 9.7% on 5/20/24. Home regimen includes Lantus 58 at night, 12 in the morning. Blood sugars at home average low 100-130's. She denies hyper/hypoglycemic unawareness.   - Educated on importance of diabetic control in preventing progression of heart and kidney disease  -  Continue current regimen     # Obesity (BMI 33)   Reports weight has been stable over the last year. Recently started back on a diet and does not eat bread anymore. Has not lost any weight yet.   - Educated patient on the importance of healthy diet and exercise in reducing risk for heart disease   - Encouraged 150 minutes/week of moderate intensity exercise   - Encouraged healthy weight loss; discussed Mediterranean diet     # ESRD on HD   She is currently on dialysis. She admits to being compliant with all medications. Denies uremic symptoms; pruritus, nausea, vomiting.  - Additional labs / follow-up / testing per SOT team  - Continue Bumex 2mg BID       CAD Risk Level:  High Risk: > 3 risk factors, OR diabetic kidney disease, OR DM > 10 years, known CAD or PAD, prior PCI or CABG, on dialysis 5 years or more         RCRI Score:   - High risk surgery (>5% cardiac complication risk) = 1 point   - Diabetes Mellitus (on Insulin) = 1 point   - Serum Creatinine >2.0 mg/dl = 1 point        I have reviewed and updated the patient's Past Medical History, Social History, Family History and Medication List.       LURDES Mcgowan, CNP  [facility]      Review of prior external note(s) from - care everywhere, SOT, nephrology  Review of the result(s) of each unique test - echocardiogram, EKG, CT  Ordering of each unique test  Prescription drug management- medication reconciliation    *** minutes spent by me on the date of the encounter doing chart review, review of outside records, review of test results, patient visit, and documentation       SIGN ***      Again, thank you for allowing me to participate in the care of your patient.        Sincerely,        LURDES Alba CNP    Electronically signed

## 2025-07-08 ENCOUNTER — PATIENT OUTREACH (OUTPATIENT)
Dept: GASTROENTEROLOGY | Facility: CLINIC | Age: 65
End: 2025-07-08
Payer: COMMERCIAL

## 2025-07-08 DIAGNOSIS — K86.2 PANCREAS CYST: Primary | ICD-10-CM

## 2025-07-08 NOTE — TELEPHONE ENCOUNTER
Contacted pt to reschedule clinic with Michelle, MRCP not scheduled in advanced which was the check out order request from 1/7/25 as 6 mo follow up. Pt is on dialysis, last GFR 9 in October. Msg routed to Michelle and referring transplant provider about changing order to be non contrast. Pt now scheduled for MRI on 7/20 and moving in person clinic to 7/28.    Esther Yoder, RN, BSN,   [facility]  Care coordinator

## 2025-07-09 NOTE — TELEPHONE ENCOUNTER
"Contacted pt to assist in rescheduling MRCP without contrast at the [facility], earliest available date of 8/8. Also rescheduling of clinic with Michelle based on her recs \"I did ok the contrast with her kidney provider when I saw her in January but on second thought after doing some reading today, let's plan to change this to non-contrast. Given this, can we make sure she is scheduled either at [facility] or the [facility]?\"    Msg routed to clinic coordinators to move visit with Michelle to 8/26 at 11:30am, virtual visit   "

## 2025-08-08 ENCOUNTER — ANCILLARY PROCEDURE (OUTPATIENT)
Dept: MRI IMAGING | Facility: CLINIC | Age: 65
End: 2025-08-08
Payer: COMMERCIAL

## 2025-08-08 DIAGNOSIS — K86.2 PANCREAS CYST: ICD-10-CM

## 2025-08-08 PROCEDURE — 74181 MRI ABDOMEN W/O CONTRAST: CPT | Performed by: RADIOLOGY

## 2025-08-10 ENCOUNTER — HEALTH MAINTENANCE LETTER (OUTPATIENT)
Age: 65
End: 2025-08-10

## 2025-08-26 ENCOUNTER — VIRTUAL VISIT (OUTPATIENT)
Dept: GASTROENTEROLOGY | Facility: CLINIC | Age: 65
End: 2025-08-26
Payer: COMMERCIAL

## 2025-08-26 DIAGNOSIS — N18.6 ESRD (END STAGE RENAL DISEASE) (H): ICD-10-CM

## 2025-08-26 DIAGNOSIS — K86.2 PANCREAS CYST: Primary | ICD-10-CM

## 2025-08-26 PROCEDURE — 98006 SYNCH AUDIO-VIDEO EST MOD 30: CPT

## (undated) RX ORDER — LIDOCAINE HYDROCHLORIDE 10 MG/ML
INJECTION, SOLUTION INFILTRATION; PERINEURAL
Status: DISPENSED
Start: 2024-09-03

## (undated) RX ORDER — HEPARIN SODIUM 1000 [USP'U]/ML
INJECTION, SOLUTION INTRAVENOUS; SUBCUTANEOUS
Status: DISPENSED
Start: 2024-07-26

## (undated) RX ORDER — CEFAZOLIN SODIUM 2 G/100ML
INJECTION, SOLUTION INTRAVENOUS
Status: DISPENSED
Start: 2024-07-26

## (undated) RX ORDER — LIDOCAINE HYDROCHLORIDE 10 MG/ML
INJECTION, SOLUTION EPIDURAL; INFILTRATION; INTRACAUDAL; PERINEURAL
Status: DISPENSED
Start: 2024-06-21

## (undated) RX ORDER — FENTANYL CITRATE 50 UG/ML
INJECTION, SOLUTION INTRAMUSCULAR; INTRAVENOUS
Status: DISPENSED
Start: 2024-07-26

## (undated) RX ORDER — HEPARIN SODIUM 1000 [USP'U]/ML
INJECTION, SOLUTION INTRAVENOUS; SUBCUTANEOUS
Status: DISPENSED
Start: 2024-06-21

## (undated) RX ORDER — FENTANYL CITRATE 50 UG/ML
INJECTION, SOLUTION INTRAMUSCULAR; INTRAVENOUS
Status: DISPENSED
Start: 2024-06-21

## (undated) RX ORDER — ACETAMINOPHEN 325 MG/1
TABLET ORAL
Status: DISPENSED
Start: 2024-07-26

## (undated) RX ORDER — LIDOCAINE HYDROCHLORIDE 10 MG/ML
INJECTION, SOLUTION INFILTRATION; PERINEURAL
Status: DISPENSED
Start: 2024-07-26

## (undated) RX ORDER — ACETAMINOPHEN 500 MG
TABLET ORAL
Status: DISPENSED
Start: 2024-07-26

## (undated) RX ORDER — CEFAZOLIN SODIUM 2 G/100ML
INJECTION, SOLUTION INTRAVENOUS
Status: DISPENSED
Start: 2024-06-21